# Patient Record
Sex: MALE | Race: WHITE
[De-identification: names, ages, dates, MRNs, and addresses within clinical notes are randomized per-mention and may not be internally consistent; named-entity substitution may affect disease eponyms.]

---

## 2017-05-17 NOTE — ED PHYSICIAN CHART
Chief Complaint/HPI





- Patient Information


Date Seen:: 05/17/17


Time Seen:: 17:30


Chief Complaint:: aggressive behavior


History of Present Illness:: 





pt sent from NH for agitation episodes // aggressive behavior..here for 

geripsych evaluation.





pt is demented and not good historian ..he knows he is at hospital but thinks 

he is here for a checkup.


currently he is calm and cooperative and denies pain or recent illness or 

injury.





Allergies:: 


 Allergies











Allergy/AdvReac Type Severity Reaction Status Date / Time


 


No Known Allergies Allergy   Verified 05/17/17 17:13











Vitals:: 


 Vital Signs - 8 hr











  05/17/17





  17:03


 


Temp 97.0 F


 


HR 52


 


RR 16


 


/52


 


O2 Sat % 97











Historian:: Patient





Review of Systems





- Review of Systems


General/Constitutional: No fever, No chills, No weight loss, No weakness, No 

diaphoresis, No edema, No loss of appetite, Other (dementia pt limits ros )


Skin: No skin lesions, No rash, No bruising


Head: No headache, No light-headedness


Eyes: No loss of vision, No pain, No diplopia


ENT: No earache, No nasal drainage, No sore throat, No tinnitus


Neck: No neck pain, No swelling, No thyromegaly, No stiffness, No mass noted


Cardio Vascular: No chest pain, No palpitations, No PND, No orthopnea, No edema


Pulmonary: No SOB, No cough, No sputum, No wheezing


GI: No nausea, No vomiting, No diarrhea, No pain, No melena, No hematochezia, 

No constipation, No hematemesis


G/U: No dysuria, No frequency, No hematuria


Musculoskeletal: No bone or joint pain, No back pain, No muscle pain


Endocrine: No polyuria, No polydipsia


Psychiatric: Prior psych history, No depression, No anxiety, No suicidal 

ideation, Other (aggitation)


Hematopoietic: No bruising, No lymphadenopathy


Allergic/Immuno: No urticaria, No angioedema


Neurological: No syncope, No focal symptoms, No weakness, No paresthesia, No 

headache, No seizure, No dizziness, No confusion, No vertigo





Past Medical History





- Past Medical History


Past Medical History: HTN, PUD/GERD, Thyroid disorder, Arthritis, Other (

parkinsons, poor mobility)


Social History: Care Facility


Psychiatricy History: Bipolar, Other (schizoaffective)


Medication: Reviewed





Family Medical History





- Family Member


  ** Mother


History Unknown: Yes


Ethnicity: Non-


Living Status: Still Living


Hx Family Cancer: No


Hx Family Coronary Artery Disease: No


Hx Family Congestive Heart Failure: No


Hx Family Hypertension: No


Hx Family Stroke: No


Hx Family Diabetes: No


Hx Family Seizures: No


Hx Family Dementia: No


Hx Family AIDS: No


Hx Family HIV: No


Hx Family COPD: No


Hx Family Hepatitis: No


Hx Family Psychiatric Problems: No


Hx Family Tuberculosis: No





Physical Exam





- Physical Examination


General/Constitutional: Awake, Well-developed, well-nourished, Alert, No 

distress, Non-toxic appearing, Ambulatory


Other Gen/Cons comments:: 





currently calm in nad.


somewhat confused/poor historian.


cooperative.





Head: Atraumatic


Eyes: Lids, conjuctiva normal, PERRL, EOMI


Skin: Nl inspection, No rash, No skin lesions, No ecchymosis, Well hydrated, No 

lymphadenopathy


ENMT: External ears, nose nl, Nasal exam nl, Lips, teeth, gums nl


Neck: Nontender, Full ROM w/o pain, No JVD, No nuchal rigidity, No bruit, No 

mass, No stridor


Respiratory: Nl effort/Exclusion, Clear to Auscultation, No Wheeze/Rhonchi/Rales


Cardio Vascular: RRR, No murmur, gallop, rubs, NL S1 S2


GI: No tenderness/rebounding/guarding, No organomegaly, No hernia, Normal BS's, 

Nondistended, No mass/bruits, No McBurney tenderness


: No CVA tenderness


Extremities: No tenderness or effusion, Full ROM, normal strength in all 

extremities, No edema, Normal digits & nails


Neuro/Psych: DTR's symmetric, Normal sensory exam, Normal motor strength, Mood 

normal, Normal gait, No focal deficits


Other Neuro/Psych comments:: 





alert but poorly oriented


Misc: normal gait, Normal back, No paraspinal tenderness





Labs/Radiology/EKG Results





- Lab Results


Results: 





 Laboratory Tests











  05/17/17 05/17/17 05/17/17





  17:32 17:32 17:32


 


WBC  10.5  D  


 


RBC  5.20  


 


Hgb  15.3  


 


Hct  45.5  


 


MCV  87.6  


 


MCH  29.3  


 


MCHC Differential  33.5  


 


RDW  14.1  


 


Plt Count  256  D  


 


MPV  7.4  


 


Neutrophils %  66.5  


 


Lymphocytes %  26.2  


 


Monocytes %  6.2  


 


Eosinophils %  0.6  


 


Basophils %  0.5  


 


Sodium   132 L 


 


Potassium   4.0 


 


Chloride   102 


 


Carbon Dioxide   27.4 


 


Anion Gap   6.6 L 


 


BUN   11 


 


Creatinine   0.7 


 


Est GFR ( Amer)   TNP 


 


Est GFR (Non-Af Amer)   TNP 


 


BUN/Creatinine Ratio   15.7 


 


Glucose   94 


 


Calcium   9.1 


 


Triglycerides   85 


 


Cholesterol   196 


 


LDL Cholesterol Direct   120 


 


HDL Cholesterol   67 


 


TSH    1.05


 


Valproic Acid   














  05/17/17





  17:32


 


WBC 


 


RBC 


 


Hgb 


 


Hct 


 


MCV 


 


MCH 


 


MCHC Differential 


 


RDW 


 


Plt Count 


 


MPV 


 


Neutrophils % 


 


Lymphocytes % 


 


Monocytes % 


 


Eosinophils % 


 


Basophils % 


 


Sodium 


 


Potassium 


 


Chloride 


 


Carbon Dioxide 


 


Anion Gap 


 


BUN 


 


Creatinine 


 


Est GFR ( Amer) 


 


Est GFR (Non-Af Amer) 


 


BUN/Creatinine Ratio 


 


Glucose 


 


Calcium 


 


Triglycerides 


 


Cholesterol 


 


LDL Cholesterol Direct 


 


HDL Cholesterol 


 


TSH 


 


Valproic Acid  64.1














- EKG Interpretations


EKG Time:: 18:20


Rate & Rhythm: nsr 51


Axis: 67


Intervals: wnl


Comments:: 





ok /wnl ...mild gerda





ED Septic Shock





- .


Is Septic Shock (SBP<90, OR Lactate>4 mmol\L) present?: No





- <6hrs of presentation:


Vital Signs: 


 Vital Signs - 8 hr











  05/17/17





  17:03


 


Temp 97.0 F


 


HR 52


 


RR 16


 


/52


 


O2 Sat % 97














Reassessment (Disposition)





- Reassessment


Reassessment Condition:: Unchanged





- Diagnosis


Diagnosis:: 





1 aggressive behavior


2 medically clear for geripsych admission











- Patient Disposition


Admitted to:: [facility]


Condition at Disposition:: Unchanged





ED Discharge Plan





- Patient Disposition


Instructions:  Psychosis

## 2017-05-18 NOTE — HISTORY & PHYSICAL
ADMIT DATE:  05/17/2017



HISTORY OF PRESENT ILLNESS:  The patient is a 71 years old male with long

history of hypertension, hypothyroidism, dementia, and psychosis, admitted to

[facility] under Dr. Land's service.  The

patient denies any chest pain, shortness of breath, nausea, vomiting, fever, or

chills.



PAST MEDICAL HISTORY:  Significant for hypertension, hypothyroidism, dementia,

and psychosis.



PAST SURGICAL HISTORY:  No recent surgery.



ALLERGIES:  None.



MEDICATIONS:  Follow admission reconciliation.



SOCIAL HISTORY:  No smoking, alcohol, or drugs.



FAMILY HISTORY:  Noncontributory.



REVIEW OF SYSTEMS:

IMMUNE SYSTEM:  No _____ disorder.

CARDIOVASCULAR SYSTEM:  He has history of hypertension.

ENDOCRINE SYSTEM:  He has history of hypothyroidism.

GASTROINTESTINAL SYSTEM:  No upper or lower gastrointestinal bleed.

NEUROLOGICAL SYSTEM:  No seizure disorder.

MUSCULOSKELETAL SYSTEM:  No muscular dystrophy.

HEMATOLOGIC SYSTEM:  No bleeding tendencies.

RESPIRATORY SYSTEM:  No asthma.



PHYSICAL EXAMINATION:

GENERAL:  He is awake, alert, mildly confused.

VITAL SIGNS:  Temperature 98.6, heart rate 55, blood pressure 148/82.

HEENT:  Normocephalic.  Pupils are reacting to light and accommodation.  Sclerae

clear.

NECK:  Supple.  Negative for lymphadenopathy, JVD, or bruit.

CHEST:  Bilaterally normal.  No rhonchi or wheezing.

HEART:  S1 and S2 normal.  No murmur or gallop.

ABDOMEN:  Soft.  Bowel sounds positive.

EXTREMITIES:  No edema.

NEUROLOGIC:  Awake, alert.  No focal motor or sensory deficits.



LABORATORY DATA:  White blood cells 10.5, hemoglobin 15.3, hematocrit 45.5,

platelets 256.  Sodium 132, potassium 4, BUN 11 creatinine 0.7.



ASSESSMENT:

1.  Hypertension.

2.  Hypothyroidism.

3.  Hyponatremia.

4.  Dementia.

5.  Psychosis.



PLAN:  The patient is in the hospital under Dr. Land's service.  Medical

problem to be addressed during the hospitalization is psychosis.  Medical

problems to be addressed after discharge are hypertension, hypothyroidism.  The

patient is medically stable for activity.



Thank you Dr. Land for asking me to see your patient.





DD: 05/17/2017 21:32

DT: 05/18/2017 00:46

JOB# [id number]  [id number]

## 2017-05-19 NOTE — PSYCHOSOCIAL EVALUATION
DATE OF SERVICE:  05/17/2017



IDENTIFYING DATA:  The patient is a 71-year-old  male, resident of

[facility].  Information obtained by interviewing the patient as

well as reviewing the admission papers.



JUSTIFICATION FOR HOSPITALIZATION:  The patient is admitted here on a voluntary

basis in view of his acute psychosis.



CHIEF COMPLAINT:  "I do not know."



HISTORY OF PRESENT ILLNESS:  This is one of multiple psychiatric

hospitalizations for this patient who has been diagnosed to have schizoaffective

disorder and had been hospitalized on multiple occasions, last hospitalization

was in 10/2016.  The patient has pain, also presenting with dementia symptoms

and the patient is getting easily agitated.  The patient, most of the time, is

laughing and giggling.



PAST PSYCHIATRIC HISTORY:  Please refer to the above.



MEDICAL HISTORY:  Physical examination is requested to be done by Dr. Albert.



SUBSTANCE ABUSE HISTORY:  None.



PHYSICAL OR SEXUAL ABUSE HISTORY:  None.



SOCIAL HISTORY:  The patient is a resident of the [facility].



MENTAL STATUS EXAMINATION:  The patient is a 71-year-old male, looking his

stated age, thin built, superficially cooperative.  Eye contact is poor.  Mood

is noted to be irritable.  Affect is constricted.  Insight and judgment are very

much impaired.  Impulse control is noted to be poor.  The patient is getting

easily irritable and angry.  The patient is pacing most of the time.  The

patient has short-term memory deficit, but long term memory seems to be fair.



STRENGTH AND ASSETS:  The patient is motivated.



DIAGNOSES AT THE TIME OF ADMISSION:

AXIS I:

1a.  Schizoaffective disorder.

1b. Dementia of Alzheimer's type.

AXIS II:  None.

AXIS III:  As per Dr. Albert.

AXIS IV:  Moderate.

AXIS V:  Global Assessment of Functioning 30, past year unknown.



IMMEDIATE TREATMENT PLAN:  The patient is going to be observed on inpatient

unit, provided with supportive psychotherapy.  The patient is going to be

closely monitored.  Once stabilized, the patient is going to be discharged to

[facility] to be followed up on an outpatient basis.





DD: 05/18/2017 11:52

DT: 05/19/2017 02:17

JOB# [id number]  [id number]

## 2017-05-20 NOTE — PROGRESS NOTES
DATE:  05/19/2017



TIME PATIENT SEEN:  3:30 p.m.



SUBJECTIVE:  Staff was spoken to.  The patient is interviewed.  Mood is noted to

be irritable.  Affect is constricted.  Insight and judgment are noted to be

still impaired.  Impulse control seems to be poor.  Coping skills are noted to

be poor.  The patient has been having difficult time to cope with the stress. 

The patient is currently on 20 mg of Zyprexa twice a day, and the patient is

stating that he has been feeling too sleepy in the morning, and because of his

age and it is decided to decrease the dose of the Zyprexa to only 20 mg at

bedtime and follow the patient with the supportive therapy.  The patient is

still psychotic and is not ready to be discharged.  The patient's coping skills

at this time are noted to be poor.  The patient is not safe to be discharged to

a lower level of care.





DD: 05/19/2017 19:06

DT: 05/20/2017 04:16

JOB# [id number]  [id number]

## 2017-05-21 NOTE — PROGRESS NOTES
DATE:  05/20/2017



PSYCHIATRIC PROGRESS NOTE



TIME PATIENT SEEN:  11:15 a.m.



SUBJECTIVE:  Staff was spoken to.  The patient is interviewed.  Mood is noted to

be irritable.  Affect is constricted.  The patient is laughing and giggling and

pacing most of the time, not making any sense.  The patient is currently on

Zyprexa.  Since the patient has been complaining that he is too sleepy, it has

been decreased to 20 mg at bedtime. The patient has been able to tolerate the

medication.



ASSESSMENT:  The patient is still grossly psychotic.



PLAN:  To continue the patient with the supportive therapy and followup.





DD: 05/20/2017 11:34

DT: 05/21/2017 08:23

JOB# [id number]  [id number]

## 2017-05-22 NOTE — PROGRESS NOTES
DATE:  05/22/2017



PSYCHIATRIC PROGRESS NOTE



TIME PATIENT SEEN:  9:45 a.m.



SUBJECTIVE:  Staff was spoken to.  The patient is interviewed.  Mood is noted to

be irritable.  Affect is constricted.  The patient is pacing most of the time on

the unit.  Insight and judgment at this time are noted to be very much impaired.

 No side effects to the medications are noted.  The patient has been having

difficult time to cope with the stress.  The patient is actively responding to

internal stimuli.  The patient is currently _____ Zyprexa and is able to

tolerate.



ASSESSMENT:  The patient is still psychotic.



PLAN:  To continue the patient with the current medications and follow up with

the supportive therapy.





DD: 05/22/2017 10:23

DT: 05/22/2017 22:35

[facility]# [id number]  [id number]

## 2017-05-22 NOTE — PROGRESS NOTES
DATE:  05/21/2017



PSYCHIATRIC PROGRESS NOTE



TIME PATIENT SEEN:  12 noon.



SUBJECTIVE:  Staff was spoken to.  The patient is interviewed.  Mood is noted to

be irritable.  Affect is constricted.  Insight and judgment are noted to be

still impaired.  The patient is pacing most of the time on the unit.  The

patient is still responding to the internal stimuli.  No side effects to the

medications are noted.  The patient has been placed on Zyprexa 10 mg a day and

_____ has been given at 500 mg twice a day.  The patient has been able to

tolerate.



ASSESSMENT:  The patient is still psychotic.



PLAN:  To continue the patient with the current medications and follow.





DD: 05/21/2017 14:18

DT: 05/22/2017 06:06

JOB# [id number]  [id number]

## 2017-05-24 NOTE — PROGRESS NOTES
DATE:  05/23/2017



PSYCHIATRIC PROGRESS NOTE



TIME PATIENT SEEN:  10:30 a.m.



SUBJECTIVE:  Staff was spoken to.  The patient is interviewed.  Mood is noted to

be dysphoric.  Coping skills are noted to be poor.  The patient is actively

responding to internal stimuli and pacing on the unit most of the time.  No

aggressive behavior is noted.  The patient is currently on Zyprexa 20 mg at

bedtime and is able to tolerate the medication.



ASSESSMENT:  The patient is still psychotic and impulsive.



PLAN:  To continue the patient with supportive therapy and followup.





DD: 05/23/2017 14:56

DT: 05/24/2017 04:55

JOB# [id number]  [id number]

## 2017-05-25 NOTE — PROGRESS NOTES
DATE:  05/24/2017



PSYCHIATRIC PROGRESS NOTE



TIME PATIENT SEEN:  5:45 p.m.



SUBJECTIVE:  Staff was spoken to.  The patient is interviewed.  Mood is noted to

be irritable.  Affect is constricted.  The patient is pacing most of the time on

the unit.  Insight and judgment are noted to be still impaired.  No side effects

to the medications are noted.  The patient could be redirected at this time.



ASSESSMENT:  The patient is still psychotic, able to tolerate the Zyprexa.



PLAN:  To continue the patient with the supportive therapy and followup.





DD: 05/24/2017 20:04

DT: 05/25/2017 19:41

JOB# [id number]  [id number]

## 2017-05-26 NOTE — PROGRESS NOTES
DATE:  05/25/2017



PSYCHIATRIC PROGRESS NOTE



TIME PATIENT SEEN:  10:00 a.m.



SUBJECTIVE:  Staff was spoken to.  The patient is interviewed.  Mood is noted to

be irritable.  Affect is constricted.  The patient is talking to self, not

making much sense.  The patient is currently on Depakote 500 mg twice a day and

olanzapine.  The patient has been able to tolerate the medications.  No side

effects to the medications are noted at this time.  The patient's sodium is

noted to be low 132 and they are trying to monitor.  Valproic acid level is

noted to be 64.1.  The patient is being closely monitored at this time for any

seizures.



ASSESSMENT:  The patient is still grossly psychotic.



PLAN:  To continue the patient with the supportive therapy.  I encouraged the

patient to verbalize the concerns rather than to act out.





DD: 05/25/2017 10:41

DT: 05/26/2017 04:59

JOB# [id number]  [id number]

## 2017-05-27 NOTE — PROGRESS NOTES
DATE:  05/27/2017



PSYCHIATRIC PROGRESS NOTE



TIME PATIENT SEEN:  10:15 a.m.



SUBJECTIVE:  Staff was spoken to.  The patient is interviewed.  Mood is noted to

be irritable.  Affect is constricted.  The patient is pacing most of the time on

the unit.  The patient is laughing and giggling.  Compared to yesterday, the

patient is not that aggressive today.  No side effects to the medications are

noted.



ASSESSMENT:  The patient is still psychotic.



PLAN:  To continue the patient with Zyprexa.  I encouraged the patient to _____

the concerns.  The patient is not ready to be discharge to a lower level of care

in view of his aggressive behavior.





DD: 05/27/2017 12:24

DT: 05/27/2017 22:26

JOB# [id number]  [id number]

## 2017-05-27 NOTE — PROGRESS NOTES
DATE:  05/26/2017



SUBJECTIVE:  Chart is reviewed.  The patient is interviewed.  Staff was spoken

to.  The patient is getting easily irritable and angry.  The patient has been

screaming and yelling.  The patient has been testing the limits on the unit. 

The patient could not be redirected and hence the patient has to be given a dose

of the Haldol and Benadryl.  The patient at this time is resting.



ASSESSMENT:  The patient is still grossly psychotic and impulsive.



PLAN:  To continue the patient with the current medications such as the Zyprexa

and follow him up with supportive therapy.





DD: 05/26/2017 18:49

DT: 05/27/2017 08:57

JOB# [id number]  [id number]

## 2017-05-28 NOTE — PROGRESS NOTES
DATE:  05/28/2017



PSYCHIATRIC PROGRESS NOTE



TIME PATIENT SEEN:  8:30 a.m.



SUBJECTIVE:  Staff was spoken to.  The patient is interviewed.  Mood is noted to

be irritable.  Affect is constricted.  The patient's insight and judgment are

noted to be very much impaired.  The patient is talking to self and has been

refusing to comply with the treatment.  The patient has been having difficult

time to cope with the stress.  The patient still continues to be paranoid and

refusing to take any medication at this morning.



ASSESSMENT:  The patient is grossly psychotic.



PLAN:  To start the patient with the Haldol Decanoate and continue the Zyprexa

at night time and follow the patient up.





DD: 05/28/2017 09:25

DT: 05/28/2017 19:05

[facility]# [id number]  [id number]

## 2017-05-29 NOTE — PROGRESS NOTES
DATE:  05/29/2017



PSYCHIATRIC PROGRESS NOTE



TIME PATIENT SEEN:  8:30 a.m.



SUBJECTIVE:  Staff was spoken to.  The patient is interviewed.  Mood is noted to

be irritable.  Affect is constricted.  The patient is pacing on the unit.  Since

the patient has been refusing, the patient had been given a dose of Haldol

Decanoate yesterday.  Screaming and yelling have been coming down, but the

patient continues to be responding to internal stimuli and is verbally abusive.



PLAN:  To continue the patient with the current medications and follow the

patient with supportive therapy _____.





DD: 05/29/2017 09:40

DT: 05/29/2017 23:27

JOB# [id number]  [id number]

## 2017-05-31 NOTE — PROGRESS NOTES
DATE:  05/30/2017



PSYCHIATRIC PROGRESS NOTE



TIME PATIENT SEEN:  8:15 a.m.



SUBJECTIVE:  Staff was spoken to.  The patient is interviewed.  Mood is noted to

be irritable.  Affect is constricted.  Insight and judgment are noted to be

still impaired.  Impulse control is noted to be poor.  The patient is testing

the limits.  No side effects to the medications are noted at this time.  The

patient is pacing most of the time on the unit.



ASSESSMENT:  The patient is still psychotic and impulsive.



PLAN:  To continue the patient with the supportive therapy.  I encouraged the

patient to verbalize the concerns rather than to act out.





DD: 05/30/2017 17:41

DT: 05/31/2017 07:57

JOB# [id number]  [id number]

## 2017-06-01 NOTE — PROGRESS NOTES
DATE:  05/31/2017



PSYCHIATRIC PROGRESS NOTE



TIME PATIENT SEEN:  9 a.m.



SUBJECTIVE:  Staff was spoken to.  The patient is interviewed.  Mood is noted to

be anxious.  Affect is appropriate.  Not suicidal or homicidal.  Insight and

judgment are noted to be improving.  Impulse control seemed to be fair.  No side

effects to the medications are noted.  The patient has been pacing on the unit. 

The patient has paranoia, but denies any command hallucinations.



ASSESSMENT:  The patient is stabilizing.



PLAN:  To discharge the patient today for followup on outpatient basis by Dr. Chun.  The patient is discharged back to [facility].





DD: 05/31/2017 18:13

DT: 06/01/2017 05:24

JOB# [id number]  [id number]

## 2017-11-04 ENCOUNTER — HOSPITAL ENCOUNTER (EMERGENCY)
Dept: HOSPITAL 26 - MED | Age: 72
LOS: 1 days | Discharge: NURSING, REHAB OR LONG-TERM CARE | End: 2017-11-05
Payer: COMMERCIAL

## 2017-11-04 VITALS — DIASTOLIC BLOOD PRESSURE: 76 MMHG | SYSTOLIC BLOOD PRESSURE: 144 MMHG

## 2017-11-04 VITALS — HEIGHT: 73 IN | WEIGHT: 160 LBS | BODY MASS INDEX: 21.2 KG/M2

## 2017-11-04 DIAGNOSIS — I10: ICD-10-CM

## 2017-11-04 DIAGNOSIS — Z79.899: ICD-10-CM

## 2017-11-04 DIAGNOSIS — F20.89: ICD-10-CM

## 2017-11-04 DIAGNOSIS — I86.1: Primary | ICD-10-CM

## 2017-11-04 PROCEDURE — 93005 ELECTROCARDIOGRAM TRACING: CPT

## 2017-11-04 PROCEDURE — 85025 COMPLETE CBC W/AUTO DIFF WBC: CPT

## 2017-11-04 PROCEDURE — 96360 HYDRATION IV INFUSION INIT: CPT

## 2017-11-04 PROCEDURE — 84484 ASSAY OF TROPONIN QUANT: CPT

## 2017-11-04 PROCEDURE — 71010: CPT

## 2017-11-04 PROCEDURE — 83605 ASSAY OF LACTIC ACID: CPT

## 2017-11-04 PROCEDURE — 85610 PROTHROMBIN TIME: CPT

## 2017-11-04 PROCEDURE — 96361 HYDRATE IV INFUSION ADD-ON: CPT

## 2017-11-04 PROCEDURE — 85730 THROMBOPLASTIN TIME PARTIAL: CPT

## 2017-11-04 PROCEDURE — 87040 BLOOD CULTURE FOR BACTERIA: CPT

## 2017-11-04 PROCEDURE — 87086 URINE CULTURE/COLONY COUNT: CPT

## 2017-11-04 PROCEDURE — 80053 COMPREHEN METABOLIC PANEL: CPT

## 2017-11-04 PROCEDURE — 76870 US EXAM SCROTUM: CPT

## 2017-11-04 PROCEDURE — 36415 COLL VENOUS BLD VENIPUNCTURE: CPT

## 2017-11-04 PROCEDURE — 99285 EMERGENCY DEPT VISIT HI MDM: CPT

## 2017-11-04 PROCEDURE — 81001 URINALYSIS AUTO W/SCOPE: CPT

## 2017-11-04 NOTE — NUR
72Y/M PT. BIBA TO ED WITH C/O ENLARGE SCROTUM. PER EMS;PT. IS A RESIDENT AT 
[facility], NOTICED HAVE ENLARGE SCROTUM AT 1930, NO INJURY NOR TRAUMA. PT. HX. HTN, 
PARKINSON'S DISEASE, BIPOLAR, SCHIZOPRENIA. AAO X1 TO SELF. AMBULATORY WITH 
ASSIST. RESPIRATIONS ROOM AIR, EVEN AND UNLABORED. SKIN WARM AND DRY, LT. 
SCROTUM ENLARGE, NO SKIN BREAKDOWN NOTED. NO C/O PAIN NOR DISCOMFORT. VSS, ER 
MD MADE AWARE OF PT. STATUS.

## 2017-11-05 VITALS — DIASTOLIC BLOOD PRESSURE: 90 MMHG | SYSTOLIC BLOOD PRESSURE: 154 MMHG

## 2017-11-05 LAB
ALBUMIN FLD-MCNC: 3.2 G/DL (ref 3.4–5)
ANION GAP SERPL CALCULATED.3IONS-SCNC: 8.4 MMOL/L (ref 8–16)
APPEARANCE UR: CLEAR
AST SERPL-CCNC: 15 U/L (ref 15–37)
BASOPHILS NFR BLD MANUAL: 1 % (ref 0–2)
BILIRUB SERPL-MCNC: 0.4 MG/DL (ref 0–1)
BILIRUB UR QL STRIP: NEGATIVE
BUN SERPL-MCNC: 14 MG/DL (ref 7–18)
CHLORIDE SERPL-SCNC: 104 MMOL/L (ref 98–107)
CO2 SERPL-SCNC: 31.5 MMOL/L (ref 21–32)
COLOR UR: YELLOW
CREAT SERPL-MCNC: 0.7 MG/DL (ref 0.7–1.3)
EOSINOPHIL NFR BLD MANUAL: 1 % (ref 0–4)
ERYTHROCYTE [DISTWIDTH] IN BLOOD BY AUTOMATED COUNT: 13.8 % (ref 11.6–13.7)
GFR SERPL CREATININE-BSD FRML MDRD: (no result) ML/MIN (ref 90–?)
GLUCOSE SERPL-MCNC: 96 MG/DL (ref 74–106)
GLUCOSE UR STRIP-MCNC: NEGATIVE MG/DL
HCT VFR BLD AUTO: 41.6 % (ref 36–52)
HGB BLD-MCNC: 14.1 G/DL (ref 12–18)
HGB UR QL STRIP: NEGATIVE
LEUKOCYTE ESTERASE UR QL STRIP: NEGATIVE
LYMPHOCYTES NFR BLD MANUAL: 23 % (ref 20–46)
MCH RBC QN AUTO: 30 PG (ref 27–31)
MCHC RBC AUTO-ENTMCNC: 34 G/DL (ref 33–37)
MCV RBC AUTO: 89 FL (ref 80–94)
MONOCYTES NFR BLD MANUAL: 5 % (ref 5–12)
NITRITE UR QL STRIP: NEGATIVE
PH UR STRIP: 7 [PH] (ref 5–9)
PLATELET # BLD AUTO: 215 K/UL (ref 140–450)
POTASSIUM SERPL-SCNC: 3.9 MMOL/L (ref 3.5–5.1)
PROTHROMBIN TIME: 10.8 SECS (ref 10.8–13.4)
RBC # BLD AUTO: 4.67 MIL/UL (ref 4.2–6.1)
RBC #/AREA URNS HPF: (no result) /HPF (ref 0–5)
SODIUM SERPL-SCNC: 140 MMOL/L (ref 136–145)
WBC # BLD AUTO: 7 K/UL (ref 4.8–10.8)
WBC,URINE: (no result) /HPF (ref 0–5)

## 2017-11-05 NOTE — NUR
Patient discharged with v/s stable. Written and verbal after care instructions 
given and explained. 

Patient alert, oriented and verbalized understanding of instructions. Ambulance 
Transport with to nursing home. All questions addressed prior to discharge. ID 
band removed. Patient advised to follow up with PMD. Rx of  given. Patient 
educated on indication of medication including possible reaction and side 
effects. Opportunity to ask questions provided and answered. MORIAH SÁNCHEZ


-------------------------------------------------------------------------------

Addendum: 11/05/17 at 0204 by [facility]

-------------------------------------------------------------------------------

FOR CLARIFICATION; PREMIER SÁNCHEZ PT. TO [facility].

## 2018-03-16 ENCOUNTER — HOSPITAL ENCOUNTER (INPATIENT)
Dept: HOSPITAL 36 - ER | Age: 73
LOS: 14 days | Discharge: NURSING, REHAB OR LONG-TERM CARE | DRG: 885 | End: 2018-03-30
Attending: PSYCHIATRY & NEUROLOGY | Admitting: PSYCHIATRY & NEUROLOGY
Payer: MEDICARE

## 2018-03-16 VITALS — SYSTOLIC BLOOD PRESSURE: 130 MMHG | DIASTOLIC BLOOD PRESSURE: 72 MMHG

## 2018-03-16 DIAGNOSIS — E86.0: ICD-10-CM

## 2018-03-16 DIAGNOSIS — F25.9: Primary | ICD-10-CM

## 2018-03-16 DIAGNOSIS — E87.1: ICD-10-CM

## 2018-03-16 DIAGNOSIS — G20: ICD-10-CM

## 2018-03-16 DIAGNOSIS — F02.81: ICD-10-CM

## 2018-03-16 DIAGNOSIS — I10: ICD-10-CM

## 2018-03-16 DIAGNOSIS — E03.9: ICD-10-CM

## 2018-03-16 DIAGNOSIS — F29: ICD-10-CM

## 2018-03-16 DIAGNOSIS — K21.9: ICD-10-CM

## 2018-03-16 DIAGNOSIS — M19.90: ICD-10-CM

## 2018-03-16 LAB
ALBUMIN SERPL-MCNC: 3.7 GM/DL (ref 4.2–5.5)
ALBUMIN/GLOB SERPL: 1.3 {RATIO} (ref 1–1.8)
ALP SERPL-CCNC: 42 U/L (ref 34–104)
ALT SERPL-CCNC: 13 U/L (ref 7–52)
ANION GAP SERPL CALC-SCNC: 5 MMOL/L (ref 7–16)
AST SERPL-CCNC: 18 U/L (ref 13–39)
BASOPHILS # BLD AUTO: 0 TH/CUMM (ref 0–0.2)
BASOPHILS NFR BLD AUTO: 0.6 % (ref 0–2)
BILIRUB SERPL-MCNC: 0.5 MG/DL (ref 0.3–1)
BUN SERPL-MCNC: 21 MG/DL (ref 7–25)
CALCIUM SERPL-MCNC: 9 MG/DL (ref 8.6–10.3)
CHLORIDE SERPL-SCNC: 102 MEQ/L (ref 98–107)
CO2 SERPL-SCNC: 28 MEQ/L (ref 21–31)
CREAT SERPL-MCNC: 0.7 MG/DL (ref 0.7–1.3)
EOSINOPHIL # BLD AUTO: 0.1 TH/CMM (ref 0.1–0.4)
EOSINOPHIL NFR BLD AUTO: 0.9 % (ref 0–5)
ERYTHROCYTE [DISTWIDTH] IN BLOOD BY AUTOMATED COUNT: 13.3 % (ref 11.5–20)
GLOBULIN SER-MCNC: 2.8 GM/DL
GLUCOSE SERPL-MCNC: 94 MG/DL (ref 70–105)
HCT VFR BLD CALC: 42.2 % (ref 41–60)
HGB BLD-MCNC: 14.2 GM/DL (ref 12–16)
LYMPHOCYTE AB SER FC-ACNC: 2.2 TH/CMM (ref 1.5–3)
LYMPHOCYTES NFR BLD AUTO: 31.7 % (ref 20–50)
MCH RBC QN AUTO: 29.5 PG (ref 27–31)
MCHC RBC AUTO-ENTMCNC: 33.7 PG (ref 28–36)
MCV RBC AUTO: 87.6 FL (ref 80–99)
MONOCYTES # BLD AUTO: 0.6 TH/CMM (ref 0.3–1)
MONOCYTES NFR BLD AUTO: 9.3 % (ref 2–10)
NEUTROPHILS # BLD: 4 TH/CMM (ref 1.8–8)
NEUTROPHILS NFR BLD AUTO: 57.5 % (ref 40–80)
PLATELET # BLD: 215 TH/CMM (ref 150–400)
PMV BLD AUTO: 7.1 FL
POTASSIUM SERPL-SCNC: 4 MEQ/L (ref 3.5–5.1)
RBC # BLD AUTO: 4.82 MIL/CMM (ref 3.8–5.8)
SODIUM SERPL-SCNC: 131 MEQ/L (ref 136–145)
WBC # BLD AUTO: 6.9 TH/CMM (ref 4.8–10.8)

## 2018-03-16 PROCEDURE — Z7610: HCPCS

## 2018-03-16 PROCEDURE — G0410 GRP PSYCH PARTIAL HOSP 45-50: HCPCS

## 2018-03-16 NOTE — ED PHYSICIAN CHART
ED Chief Complaint/HPI





- Patient Information


Date Seen:: 03/16/18


Time Seen:: 18:15


Chief Complaint:: Increased agitation


History of Present Illness:: 


73 yo male was brought from SNF to ER due to increased agitation and confusion, 

with yelling and slamming doors.


Allergies:: 


 Allergies











Allergy/AdvReac Type Severity Reaction Status Date / Time


 


No Known Allergies Allergy   Verified 03/16/18 18:11














ED Review of Systems





- Review of Systems


General/Constitutional: No fever


Skin: No skin lesions


Head: No headache


Eyes: No pain


ENT: No earache


Neck: No neck pain


Cardio Vascular: No chest pain


Pulmonary: No SOB


GI: No nausea, No vomiting


Musculoskeletal: No bone or joint pain


Psychiatric: Prior psych history





ED Past Medical History





- Past Medical History


Past Medical History: HTN, PUD/GERD, Thyroid disorder, Arthritis


Social History: Non Smoker, No Alcohol, No Drug Use


Surgical History: None


Psychiatricy History: Schizophrenia, Bipolar, Dementia, Other (Anxiety, 

Parkinson's disease)





Family Medical History





- Family Member


  ** Mother


History Unknown: Yes


Ethnicity: Unknown


Living Status: Unknown


Hx Family Cancer: No


Hx Family Coronary Artery Disease: No


Hx Family Congestive Heart Failure: No


Hx Family Hypertension: No


Hx Family Stroke: No


Hx Family Diabetes: No


Hx Family Seizures: No


Hx Family Dementia: No


Hx Family AIDS: No


Hx Family HIV: No


Hx Family COPD: No


Hx Family Hepatitis: No


Hx Family Psychiatric Problems: No


Hx Family Tuberculosis: No





ED Physical Exam





- Physical Examination


General/Constitutional: Awake, Alert


Head: Atraumatic


Eyes: PERRL


Skin: No skin lesions


ENMT: Nasal exam nl


Neck: No nuchal rigidity


Respiratory: Clear to Auscultation, No Wheeze/Rhonchi/Rales


Cardio Vascular: RRR, No murmur, gallop, rubs, NL S1 S2


GI: No tenderness/rebounding/guarding


Extremities: normal strength in all extremities


Neuro/Psych: No focal deficits


Other Neuro/Psych comments:: 


Oriented to self only, unable to follow command





ED Labs/Radiology/EKG Results





- Radiology Results


Results: 


CXR: no consolidation





- EKG Interpretations


EKG Time:: 18:27


Rate & Rhythm: Sinus rhythm





ED Assessment





- Assessment


General Assessment: 


Psychosis


Dehydration


Hyponatremia


Assessment/Comments:: 


CBC, CMP, UA


CXR, EKG


NS 1L IV bolus


Cleared for psych admission





ED Septic Shock





- .


Is Septic Shock (SBP<90, OR Lactate>4 mmol\L) present?: No





ED Reassessment (Disposition)





- Reassessment


Reassessment Condition:: Improved





- Patient Disposition


Discharge/Transfer:: Mohinder patterson/in this hosp


Admitting Medical Physician:: Antony Albert


Admitting Psych Physician:: Arvind Land

## 2018-03-17 RX ADMIN — Medication SCH TAB: at 08:55

## 2018-03-17 RX ADMIN — LEVOTHYROXINE SODIUM SCH MG: 100 TABLET ORAL at 06:39

## 2018-03-17 NOTE — DIAGNOSTIC IMAGING REPORT
Portable chest x-ray



History: Shortness of breath



Allowing for portable technique the heart size is normal. No focal

pulmonary parenchymal processes. No hilar or mediastinal abnormalities. 

Degenerative changes seen to the spine.



Impression: No acute abnormalities.

## 2018-03-17 NOTE — HISTORY & PHYSICAL
ADMIT DATE:  



HISTORY OF PRESENT ILLNESS:  The patient is a 72 years old male with long

history of hypertension, hypothyroidism, dementia, psychosis, admitted to

[facility] Department under Dr. Land's service for evaluation and

treatment.  The patient is a very poor historian.



PAST MEDICAL HISTORY:  Significant for hypertension, hypothyroidism, dementia,

psychosis and degenerative joint disease.



PAST SURGICAL HISTORY:  No recent surgery.



ALLERGIES:  None.



MEDICATIONS:  Follow admission reconciliation..



SOCIAL HISTORY:  No smoking, no alcohol, no drug.



FAMILY HISTORY:  Noncontributory.



REVIEW OF SYSTEMS:

IMMUNO SYSTEM:  No history of chronic immune disorder.

CARDIOVASCULAR SYSTEM:  No coronary artery disease.

ENDOCRINE SYSTEM:  No diabetes mellitus.  He has history of hypothyroidism.

GASTROINTESTINAL SYSTEM:  No upper or lower gastrointestinal bleed.

NEUROLOGICAL SYSTEM:  He has history of psychosis, dementia.

SKELETOMUSCULAR SYSTEM:  He has degenerative joint disease.

RESPIRATORY SYSTEM:  No asthma.

GENITOURINARY:  No dysuria or hematuria..



PHYSICAL EXAMINATION:

GENERAL:  He is awake, not coherent.

VITAL SIGNS:  Temperature 98, heart rate 68, blood pressure 136/72.

HEENT:  Normocephalic.  Pupils are reacting to light and accommodation.  Sclerae

clear.

NECK:  Supple.  Negative for lymphadenopathy, JVD or bruit.

CHEST:  Entry of air bilaterally normal.  No rhonchi or wheezing.

HEART:  S1, S2 normal, no gallop rhythm.

ABDOMEN:  Soft, bowel sounds positive.

EXTREMITIES:  No edema.

NEUROLOGIC:  He is awake, not coherent.  He has unstable gait.



LABORATORY DATA:  White blood 6.9, hemoglobin 14.2, hematocrit 42.2, platelets

250.  Sodium 131, potassium 4, BUN 21, creatinine 0.7.



ASSESSMENT:

1.  Hypertension.

2.  Hypothyroidism.

3.  Hyponatremia.

4.  Degenerative joint disease.

5.  Psychosis.

6.  Dementia.



PLAN:  The patient is in the hospital under Dr. Land's service for more

evaluation and treatment.  Medical problems addressed during hospitalization are

psychosis and dementia.  Medical problems addressed at discharge are

hypertension and hypothyroidism.  The patient is medically stable for activity.



Thank you, Dr. Land for asking me to see your patient.





DD: 03/16/2018 23:27

DT: 03/17/2018 01:33

JOB# [id number]  [id number]

## 2018-03-17 NOTE — GENERAL PROGRESS NOTE
Subjective





- Review of Systems


Service Date: 03/17/18


Subjective: 





resting comfortably


no distress





Objective





- Results


Result Diagrams: 


 03/16/18 18:28





 03/16/18 18:28


Recent Labs: 


 Laboratory Last Values











WBC  6.9 Th/cmm (4.8-10.8)   03/16/18  18:28    


 


RBC  4.82 Mil/cmm (3.80-5.80)   03/16/18  18:28    


 


Hgb  14.2 gm/dL (12-16)   03/16/18  18:28    


 


Hct  42.2 % (41.0-60)   03/16/18  18:28    


 


MCV  87.6 fl (80-99)   03/16/18  18:28    


 


MCH  29.5 pg (27.0-31.0)   03/16/18 18:28    


 


MCHC Differential  33.7 pg (28.0-36.0)   03/16/18 18:28    


 


RDW  13.3 % (11.5-20.0)   03/16/18 18:28    


 


Plt Count  215 Th/cmm (150-400)   03/16/18  18:28    


 


MPV  7.1 fl  03/16/18  18:28    


 


Neutrophils %  57.5 % (40.0-80.0)   03/16/18  18:28    


 


Lymphocytes %  31.7 % (20.0-50.0)   03/16/18 18:28    


 


Monocytes %  9.3 % (2.0-10.0)   03/16/18  18:28    


 


Eosinophils %  0.9 % (0.0-5.0)   03/16/18 18:28    


 


Basophils %  0.6 % (0.0-2.0)   03/16/18  18:28    


 


Sodium  131 mEq/L (136-145)  L  03/16/18  18:28    


 


Potassium  4.0 mEq/L (3.5-5.1)   03/16/18  18:28    


 


Chloride  102 mEq/L ()   03/16/18  18:28    


 


Carbon Dioxide  28.0 mEq/L (21.0-31.0)   03/16/18  18:28    


 


Anion Gap  5.0  (7.0-16.0)  L  03/16/18  18:28    


 


BUN  21 mg/dL (7-25)   03/16/18  18:28    


 


Creatinine  0.7 mg/dL (0.7-1.3)   03/16/18  18:28    


 


Est GFR ( Amer)  TNP   03/16/18  18:28    


 


Est GFR (Non-Af Amer)  TNP   03/16/18  18:28    


 


BUN/Creatinine Ratio  30.0   03/16/18  18:28    


 


Glucose  94 mg/dL ()   03/16/18  18:28    


 


Calcium  9.0 mg/dL (8.6-10.3)   03/16/18  18:28    


 


Total Bilirubin  0.5 mg/dL (0.3-1.0)   03/16/18  18:28    


 


AST  18 U/L (13-39)   03/16/18  18:28    


 


ALT  13 U/L (7-52)   03/16/18  18:28    


 


Alkaline Phosphatase  42 U/L ()   03/16/18  18:28    


 


Total Protein  6.5 gm/dL (6.0-8.3)   03/16/18  18:28    


 


Albumin  3.7 gm/dL (4.2-5.5)  L  03/16/18  18:28    


 


Globulin  2.8 gm/dL  03/16/18  18:28    


 


Albumin/Globulin Ratio  1.3  (1.0-1.8)   03/16/18  18:28    


 


TSH  0.56 uIU/ml (0.34-5.60)   03/16/18  18:28    














- Physical Exam


Vitals and I&O: 


 Vital Signs











Temp  98.0 F   03/16/18 22:11


 


Pulse  81   03/17/18 08:56


 


Resp  20   03/16/18 22:11


 


BP  161/73   03/17/18 08:56


 


Pulse Ox  98   03/16/18 22:11











Active Medications: 


Current Medications





Acetaminophen (Tylenol)  650 mg PO Q4HR PRN


   PRN Reason: Mild Pain / Temp above 100


   Stop: 05/15/18 22:05


Al Hydrox/Mg Hydrox/Simethicone (Maalox)  30 ml PO Q4HR PRN


   PRN Reason: GI DISTRESS


   Stop: 05/15/18 22:05


Divalproex Sodium (Depakote Er)  500 mg PO Q12HR DANYA


   PRN Reason: Protocol


   Stop: 05/16/18 08:59


Docusate Sodium (Colace)  100 mg PO BID DANYA


   Stop: 05/16/18 08:59


   Last Admin: 03/17/18 08:56 Dose:  100 mg


Levothyroxine Sodium (Synthroid)  0.1 mg PO QDAC DANYA


   Stop: 05/16/18 07:29


   Last Admin: 03/17/18 06:39 Dose:  0.1 mg


Lorazepam (Ativan)  0.5 mg PO Q4HR PRN; Protocol


   PRN Reason: Anxiety


   Stop: 04/15/18 22:05


Magnesium Hydroxide (Milk Of Magnesia)  30 ml PO HS PRN


   PRN Reason: Constipation


Magnesium Hydroxide (Milk Of Magnesia)  30 ml PO DAILY PRN


   PRN Reason: Constipation


   Stop: 05/15/18 23:17


Metoprolol Succinate (Toprol Xl)  50 mg PO DAILY DANYA


   Stop: 05/16/18 08:59


   Last Admin: 03/17/18 08:56 Dose:  50 mg


Olanzapine (Zyprexa)  20 mg PO HS DANYA


   PRN Reason: Protocol


   Stop: 05/16/18 20:59


Zolpidem Tartrate (Ambien)  5 mg PO HS PRN


   PRN Reason: Insomnia


   Stop: 05/15/18 22:05








General: Alert, No acute distress


HEENT: PERRLA, EOMI


Neck: Supple, JVD, Thyromegaly


Cardiovascular: Regular rate, Normal S1, Normal S2


Lungs: Clear to auscultation


Abdomen: Bowel sounds, Soft





- Procedures


Procedures: 


 Procedures











Procedure Code Date


 


GROUP PSYCHOTHERAPY 63054 05/02/16


 


GROUP PSYCHOTHERAPY GZHZZZZ 05/02/16


 


GROUP PSYCHOTHERAPY [id number] 01/18/16


 


GROUP PSYCHOTHERAPY GZHZZZZ 01/18/16


 


INDIVID PSYCHOTHERAP NEC 94.39 05/20/08


 


OTHER GROUP THERAPY 94.44 08/27/15


 


RECREATIONAL THERAPY 93.81 01/17/13














Assessment/Plan





- Assessment


Assessment: 





HTN


Hypothyroid


dementia


psychosis


DJD





- Plan


Plan: 





cont current treatment

## 2018-03-18 RX ADMIN — Medication SCH TAB: at 08:18

## 2018-03-18 RX ADMIN — LEVOTHYROXINE SODIUM SCH MG: 100 TABLET ORAL at 06:53

## 2018-03-18 NOTE — PSYCHOSOCIAL EVALUATION
DATE OF SERVICE:  03/16/2018



IDENTIFYING DATA:  The patient is a 72-year-old resident of [facility].  Information obtained by directly interviewing the patient as well

as reviewing the admission papers and they are reliable.



JUSTIFICATION FOR HOSPITALIZATION:  The patient is admitted here on a voluntary

basis in view of his acute psychosis.



CHIEF COMPLAINT:  "They are bothering me a lot."



HISTORY OF PRESENT ILLNESS:  This is one of multiple psychiatric

hospitalizations for this patient who had been under my care last year.  The

patient has been diagnosed to have schizoaffective disorder and being followed

by Dr. Driscoll on an outpatient basis.  The patient has been on Zyprexa.  The

patient is reported to have been very agitated, screaming and yelling and

striking out at the patients and other residents and the staff members and hence

the patient has to be transferred over here because he was not able to be

managed at a lower level of care.



PAST PSYCHIATRIC HISTORY:  Please refer to the above.  The patient was

hospitalized on multiple occasions.



MEDICAL AND PHYSICAL EXAMINATION:  Requested to be done by Dr. Albert.



PHYSICAL OR SEXUAL ABUSE HISTORY:  None.



LEGAL PROBLEMS:  None at this time.



SOCIAL HISTORY:  The patient is a resident of the [facility].



MENTAL STATUS EXAMINATION:  The patient is a 72-year-old, looking his stated

age, superficially cooperative.  Eye contact is poor.  Mood is noted to be

irritable.  Affect is constricted.  The patient is talking to self.  The patient

is very paranoid at this time.  The patient is not able to contract for safety. 

Insight and judgment at this time are noted to be much impaired.  Impulse

control seems to be limited.  The patient is alert and aware that he is in the

hospital.  The patient's coping skills are noted to be poor at this time.  The

patient has been very nauseated and he is getting easily upset.  The patient is

alert and oriented x 3.



DIAGNOSTIC IMPRESSION:

AXIS I:  Schizoaffective disorder.

AXIS II:  Dementia and behavioral change, secondary trait.

AXIS III:  As per Dr. Albert.



IMMEDIATE TREATMENT PLAN:  The patient is going to be observed on inpatient

unit, provided with supportive psychotherapy.  The patient is going to be

closely monitored.  He is going to be continued on the olanzapine 20 mg at

bedtime and the patient is going to be encouraged to participate in the groups

and verbalize the concerns.  Once stabilized, the patient is going to be

discharged to [facility] to be followed up on an outpatient basis.





DD: 03/17/2018 14:09

DT: 03/18/2018 02:49

JOB# [id number]  [id number]

## 2018-03-18 NOTE — GENERAL PROGRESS NOTE
Subjective





- Review of Systems


Service Date: 03/18/18


Subjective: 





resting comfortably


no distress





Objective





- Results


Result Diagrams: 


 03/16/18 18:28





 03/16/18 18:28


Recent Labs: 


 Laboratory Last Values











WBC  6.9 Th/cmm (4.8-10.8)   03/16/18  18:28    


 


RBC  4.82 Mil/cmm (3.80-5.80)   03/16/18  18:28    


 


Hgb  14.2 gm/dL (12-16)   03/16/18  18:28    


 


Hct  42.2 % (41.0-60)   03/16/18  18:28    


 


MCV  87.6 fl (80-99)   03/16/18  18:28    


 


MCH  29.5 pg (27.0-31.0)   03/16/18 18:28    


 


MCHC Differential  33.7 pg (28.0-36.0)   03/16/18 18:28    


 


RDW  13.3 % (11.5-20.0)   03/16/18 18:28    


 


Plt Count  215 Th/cmm (150-400)   03/16/18  18:28    


 


MPV  7.1 fl  03/16/18  18:28    


 


Neutrophils %  57.5 % (40.0-80.0)   03/16/18  18:28    


 


Lymphocytes %  31.7 % (20.0-50.0)   03/16/18 18:28    


 


Monocytes %  9.3 % (2.0-10.0)   03/16/18  18:28    


 


Eosinophils %  0.9 % (0.0-5.0)   03/16/18 18:28    


 


Basophils %  0.6 % (0.0-2.0)   03/16/18  18:28    


 


Sodium  131 mEq/L (136-145)  L  03/16/18  18:28    


 


Potassium  4.0 mEq/L (3.5-5.1)   03/16/18  18:28    


 


Chloride  102 mEq/L ()   03/16/18  18:28    


 


Carbon Dioxide  28.0 mEq/L (21.0-31.0)   03/16/18  18:28    


 


Anion Gap  5.0  (7.0-16.0)  L  03/16/18  18:28    


 


BUN  21 mg/dL (7-25)   03/16/18  18:28    


 


Creatinine  0.7 mg/dL (0.7-1.3)   03/16/18  18:28    


 


Est GFR ( Amer)  TNP   03/16/18  18:28    


 


Est GFR (Non-Af Amer)  TNP   03/16/18  18:28    


 


BUN/Creatinine Ratio  30.0   03/16/18  18:28    


 


Glucose  94 mg/dL ()   03/16/18  18:28    


 


Calcium  9.0 mg/dL (8.6-10.3)   03/16/18  18:28    


 


Total Bilirubin  0.5 mg/dL (0.3-1.0)   03/16/18  18:28    


 


AST  18 U/L (13-39)   03/16/18  18:28    


 


ALT  13 U/L (7-52)   03/16/18  18:28    


 


Alkaline Phosphatase  42 U/L ()   03/16/18  18:28    


 


Total Protein  6.5 gm/dL (6.0-8.3)   03/16/18  18:28    


 


Albumin  3.7 gm/dL (4.2-5.5)  L  03/16/18  18:28    


 


Globulin  2.8 gm/dL  03/16/18  18:28    


 


Albumin/Globulin Ratio  1.3  (1.0-1.8)   03/16/18  18:28    


 


TSH  0.56 uIU/ml (0.34-5.60)   03/16/18  18:28    














- Physical Exam


Vitals and I&O: 


 Vital Signs











Temp  97.0 F   03/17/18 15:34


 


Pulse  72   03/18/18 08:17


 


Resp  18   03/17/18 20:00


 


BP  151/69   03/18/18 08:17


 


Pulse Ox  98   03/17/18 15:34








 Intake & Output











 03/17/18 03/18/18 03/18/18





 18:59 06:59 18:59


 


Intake Total 1200  


 


Balance 1200  


 


Intake:   


 


  Oral 1200  


 


Other:   


 


  # Voids 3  


 


  # Bowel Movements 1  











Active Medications: 


Current Medications





Acetaminophen (Tylenol)  650 mg PO Q4HR PRN


   PRN Reason: Mild Pain / Temp above 100


   Stop: 05/15/18 22:05


Al Hydrox/Mg Hydrox/Simethicone (Maalox)  30 ml PO Q4HR PRN


   PRN Reason: GI DISTRESS


   Stop: 05/15/18 22:05


Divalproex Sodium (Depakote Er)  500 mg PO Q12HR DANYA


   PRN Reason: Protocol


   Stop: 05/16/18 08:59


   Last Admin: 03/18/18 08:18 Dose:  500 mg


Docusate Sodium (Colace)  100 mg PO BID DANYA


   Stop: 05/16/18 08:59


   Last Admin: 03/18/18 08:18 Dose:  100 mg


Levothyroxine Sodium (Synthroid)  0.1 mg PO QDAC DANYA


   Stop: 05/16/18 07:29


   Last Admin: 03/18/18 06:53 Dose:  0.1 mg


Lorazepam (Ativan)  0.5 mg PO Q4HR PRN; Protocol


   PRN Reason: Anxiety


   Stop: 04/15/18 22:05


Magnesium Hydroxide (Milk Of Magnesia)  30 ml PO HS PRN


   PRN Reason: Constipation


Magnesium Hydroxide (Milk Of Magnesia)  30 ml PO DAILY PRN


   PRN Reason: Constipation


   Stop: 05/15/18 23:17


Metoprolol Succinate (Toprol Xl)  50 mg PO DAILY DANYA


   Stop: 05/16/18 08:59


   Last Admin: 03/18/18 08:17 Dose:  50 mg


Olanzapine (Zyprexa)  20 mg PO HS DANYA


   PRN Reason: Protocol


   Stop: 05/16/18 20:59


   Last Admin: 03/17/18 21:12 Dose:  20 mg


Zolpidem Tartrate (Ambien)  5 mg PO HS PRN


   PRN Reason: Insomnia


   Stop: 05/15/18 22:05


   Last Admin: 03/17/18 21:12 Dose:  5 mg








General: Alert, No acute distress


HEENT: PERRLA, EOMI


Neck: Supple, JVD, Thyromegaly


Cardiovascular: Regular rate, Normal S1, Normal S2


Lungs: Clear to auscultation


Abdomen: Bowel sounds, Soft





- Procedures


Procedures: 


 Procedures











Procedure Code Date


 


GROUP PSYCHOTHERAPY 79505 05/02/16


 


GROUP PSYCHOTHERAPY GZHZZZZ 05/02/16


 


GROUP PSYCHOTHERAPY [id number] 01/18/16


 


GROUP PSYCHOTHERAPY GZHZZZZ 01/18/16


 


INDIVID PSYCHOTHERAP NEC 94.39 05/20/08


 


OTHER GROUP THERAPY 94.44 08/27/15


 


RECREATIONAL THERAPY 93.81 01/17/13














Assessment/Plan





- Assessment


Assessment: 





HTN


Hypothyroid


dementia


psychosis


DJD





- Plan


Plan: 





cont current treatment

## 2018-03-19 RX ADMIN — LEVOTHYROXINE SODIUM SCH MG: 100 TABLET ORAL at 06:33

## 2018-03-19 RX ADMIN — Medication SCH TAB: at 08:24

## 2018-03-19 NOTE — INTERNAL MEDICINE PROG NOTE
Internal Medicine Subjective





- Subjective


Service Date: 03/19/18


Patient seen and examined:: with staff





Internal Medicine Objective





- Results


Result Diagrams: 


 03/16/18 18:28





 03/16/18 18:28


Recent Labs: 


 Laboratory Last Values











WBC  6.9 Th/cmm (4.8-10.8)   03/16/18  18:28    


 


RBC  4.82 Mil/cmm (3.80-5.80)   03/16/18  18:28    


 


Hgb  14.2 gm/dL (12-16)   03/16/18  18:28    


 


Hct  42.2 % (41.0-60)   03/16/18  18:28    


 


MCV  87.6 fl (80-99)   03/16/18  18:28    


 


MCH  29.5 pg (27.0-31.0)   03/16/18 18:28    


 


MCHC Differential  33.7 pg (28.0-36.0)   03/16/18  18:28    


 


RDW  13.3 % (11.5-20.0)   03/16/18  18:28    


 


Plt Count  215 Th/cmm (150-400)   03/16/18  18:28    


 


MPV  7.1 fl  03/16/18  18:28    


 


Neutrophils %  57.5 % (40.0-80.0)   03/16/18  18:28    


 


Lymphocytes %  31.7 % (20.0-50.0)   03/16/18  18:28    


 


Monocytes %  9.3 % (2.0-10.0)   03/16/18  18:28    


 


Eosinophils %  0.9 % (0.0-5.0)   03/16/18  18:28    


 


Basophils %  0.6 % (0.0-2.0)   03/16/18  18:28    


 


Sodium  131 mEq/L (136-145)  L  03/16/18  18:28    


 


Potassium  4.0 mEq/L (3.5-5.1)   03/16/18  18:28    


 


Chloride  102 mEq/L ()   03/16/18  18:28    


 


Carbon Dioxide  28.0 mEq/L (21.0-31.0)   03/16/18  18:28    


 


Anion Gap  5.0  (7.0-16.0)  L  03/16/18  18:28    


 


BUN  21 mg/dL (7-25)   03/16/18  18:28    


 


Creatinine  0.7 mg/dL (0.7-1.3)   03/16/18  18:28    


 


Est GFR ( Amer)  TNP   03/16/18  18:28    


 


Est GFR (Non-Af Amer)  TNP   03/16/18  18:28    


 


BUN/Creatinine Ratio  30.0   03/16/18  18:28    


 


Glucose  94 mg/dL ()   03/16/18  18:28    


 


Calcium  9.0 mg/dL (8.6-10.3)   03/16/18  18:28    


 


Total Bilirubin  0.5 mg/dL (0.3-1.0)   03/16/18  18:28    


 


AST  18 U/L (13-39)   03/16/18  18:28    


 


ALT  13 U/L (7-52)   03/16/18  18:28    


 


Alkaline Phosphatase  42 U/L ()   03/16/18  18:28    


 


Total Protein  6.5 gm/dL (6.0-8.3)   03/16/18  18:28    


 


Albumin  3.7 gm/dL (4.2-5.5)  L  03/16/18  18:28    


 


Globulin  2.8 gm/dL  03/16/18  18:28    


 


Albumin/Globulin Ratio  1.3  (1.0-1.8)   03/16/18  18:28    


 


TSH  0.56 uIU/ml (0.34-5.60)   03/16/18  18:28    














- Physical Exam


Vitals and I&O: 


 Vital Signs











Temp  97.4 F   03/19/18 20:36


 


Pulse  74   03/19/18 20:36


 


Resp  18   03/19/18 20:36


 


BP  126/68   03/19/18 20:36


 


Pulse Ox  98   03/19/18 20:36








 Intake & Output











 03/19/18 03/19/18 03/20/18





 06:59 18:59 06:59


 


Intake Total 240 1300 240


 


Balance 240 1300 240


 


Weight (lbs)   74.389 kg


 


Intake:   


 


  Oral 240 1300 240


 


Other:   


 


  # Voids 2 4 3


 


  # Bowel Movements  1 0











Active Medications: 


Current Medications





Acetaminophen (Tylenol)  650 mg PO Q4HR PRN


   PRN Reason: Mild Pain / Temp above 100


   Stop: 05/15/18 22:05


Al Hydrox/Mg Hydrox/Simethicone (Maalox)  30 ml PO Q4HR PRN


   PRN Reason: GI DISTRESS


   Stop: 05/15/18 22:05


Divalproex Sodium (Depakote Er)  500 mg PO Q12HR DANYA


   PRN Reason: Protocol


   Stop: 05/16/18 08:59


   Last Admin: 03/19/18 20:35 Dose:  500 mg


Docusate Sodium (Colace)  100 mg PO BID DANYA


   Stop: 05/16/18 08:59


   Last Admin: 03/19/18 16:08 Dose:  100 mg


Levothyroxine Sodium (Synthroid)  0.1 mg PO QDAC DANYA


   Stop: 05/16/18 07:29


   Last Admin: 03/19/18 06:33 Dose:  0.1 mg


Lorazepam (Ativan)  0.5 mg PO Q4HR PRN; Protocol


   PRN Reason: Anxiety


   Stop: 04/15/18 22:05


Magnesium Hydroxide (Milk Of Magnesia)  30 ml PO HS PRN


   PRN Reason: Constipation


Magnesium Hydroxide (Milk Of Magnesia)  30 ml PO DAILY PRN


   PRN Reason: Constipation


   Stop: 05/15/18 23:17


Metoprolol Succinate (Toprol Xl)  50 mg PO DAILY DANYA


   Stop: 05/16/18 08:59


   Last Admin: 03/19/18 08:25 Dose:  Not Given


Olanzapine (Zyprexa)  20 mg PO HS DANYA


   PRN Reason: Protocol


   Stop: 05/16/18 20:59


   Last Admin: 03/19/18 20:35 Dose:  20 mg


Zolpidem Tartrate (Ambien)  5 mg PO HS PRN


   PRN Reason: Insomnia


   Stop: 05/15/18 22:05


   Last Admin: 03/19/18 21:18 Dose:  5 mg








General: demented


HEENT: NC/AT, PERRLA, EOMI, anicteric sclerae, throat clear


Neck: Supple, No JVD, No thyromegaly, No LAD


Lungs: CTAB


Cardiovascular: RRR, Normal S1, Normal S2, without murmur


Abdomen: soft, non-tender, non-distended


Extremities: clear


Neurological: no change





- Procedures


Procedures: 


 Procedures











Procedure Code Date


 


GROUP PSYCHOTHERAPY [id number] 05/02/16


 


GROUP PSYCHOTHERAPY [id number] 05/02/16


 


GROUP PSYCHOTHERAPY [id number] 01/18/16


 


GROUP PSYCHOTHERAPY [id number] 01/18/16


 


INDIVID PSYCHOTHERAP NEC 94.39 05/20/08


 


OTHER GROUP THERAPY 94.44 08/27/15


 


RECREATIONAL THERAPY 93.81 01/17/13














Internal Medicine Assmt/Plan





- Assessment


Assessment: 





1.htn.


2.hypothyroidisim.


3.djd.


4.dementia.





- Plan


Plan: 





continue on current medication and diet.

## 2018-03-19 NOTE — PROGRESS NOTES
DATE:  03/18/2018



PSYCHIATRIC PROGRESS NOTE



SUBJECTIVE:  Staff was spoken to.  The patient is interviewed.  Mood is noted to

be irritable.  Affect is constricted.  Insight and judgment at this time are

noted to be impaired.  Impulse control is noted to be poor.  Coping skills are

also noted to be very poor.  The patient is currently on Depakote 500 mg twice a

day and olanzapine 20 mg at bedtime.  The patient has been able to tolerate the

medication, but has been pacing most of the time, continues to be very paranoid

and has been responding to the internal stimuli.



ASSESSMENT:  The patient is still psychotic.



PLAN:  To continue the patient with the current medications and follow.





DD: 03/18/2018 13:59

DT: 03/19/2018 05:24

JOB# [id number]  [id number]

## 2018-03-20 RX ADMIN — Medication SCH TAB: at 08:14

## 2018-03-20 RX ADMIN — LEVOTHYROXINE SODIUM SCH MG: 100 TABLET ORAL at 06:32

## 2018-03-20 NOTE — CONSULTATION
DATE OF CONSULTATION:     03/19/2018



REQUESTING PHYSICIAN:  Arvind Land M.D.



TYPE OF CONSULTATION:  Psychology.



HISTORY OF PRESENT ILLNESS:  The following is by review of the medical record

and by patient's self report.  According to record review, the patient is a

72-year-old  male who is a resident of [facility].  The

patient is known to this writer from multiple previous hospitalizations as well

as from the patient's skilled nursing facility for many years.  The patient is

being admitted for acute psychosis as well as physically acting out at his

placement.  Upon interview, the patient is selectively mute.  The patient did

not answer questions about becoming agitated; however, reports from his

facility by the staff indicate that he had become easily agitated as well as

screaming, yelling and striking out at staff members and other residents.  The

patient denied any suicidal ideation or any homicidal ideation, plan or

intention.



PAST MEDICAL HISTORY:  Please see history and physical by Dr. Albert.



PAST PSYCHIATRIC HISTORY:  The patient has multiple previous hospitalizations. 

The patient is under the care of a psychiatrist and psychologist at his

placement.  The patient has a long history of schizoaffective disorder.



CURRENT MEDICATIONS:  Please see admission reconciliation.



ALLERGIES:  No known drug allergies.



SUBSTANCE ABUSE HISTORY:  None.



PSYCHOSOCIAL HISTORY:  The patient is a resident of [facility]. 

The patient does not have any family support.  The patient did not answer

questions about occupational or educational history or Hindu affiliation.



MENTAL STATUS EXAMINATION:  The patient appears to be his stated age.  The

patient's attitude is guarded and suspicious, but cooperative at times.  Eye

contact is fair to poor.  Speech is selectively mute.  Mood is irritable.  
Affect is flat.  

Thought process shows to indicate suspiciousness as well as responding to 
internal stimuli. 

The patient has not been redirectable on the unit; however, the patient does 
respond

at times to repeated attempts at redirection.  The patient denied any auditory

or visual hallucinations; however, the patient perhaps is experiencing auditory

hallucinations.  Concentration is poor.  Impulse control is inadequate. 

Sensorium is alert and oriented to person and place, but not date or time.  The

patient did not participate in the memory assessment.  Immediate memory and

short term memory are impaired.  Long-term memory is impaired.  The patient did

not participate in the interpretation of proverbs.  Insight is impaired. 

Judgment is impaired.



DIAGNOSTIC IMPRESSION:

AXIS I:

1.  History of schizoaffective disorder.

2.  Dementia with behavioral disturbance.

AXIS II:  Deferred.

AXIS III:  Please see history and physical by Dr. Albert.



TREATMENT PLAN:  The patient has been seen by Dr. Land for psychiatric

evaluation and for the management of the patient's psychotropic medications. 

The patient is continued on olanzapine 20 mg at bedtime.  We will provide

supportive psychotherapy to include a de-escalation skill as well as

cognitive and behavioral redirection.  We will provide reality orientation,

reality differentiation and reality integration.  We will provide motivational

enhancement for the patient to become compliant and stay compliant with all

aspects of his care and treatment plan.  We will provide positive reinforcement

for the patient to interact appropriately with staff members as well as other

patients here on the unit as well as at his placement.



Thank you, Dr. Land for this consult and the opportunity to participate

with you in your patient's care.





DD: 03/20/2018 08:58

DT: 03/20/2018 09:23

JOB# [id number]  [id number]

[facility]

## 2018-03-20 NOTE — INTERNAL MEDICINE PROG NOTE
Internal Medicine Subjective





- Subjective


Service Date: 03/20/18


Patient seen and examined:: with staff


Patient is:: awake, in bed


Per staff patient has:: no adverse event





Internal Medicine Objective





- Results


Result Diagrams: 


 03/16/18 18:28





 03/16/18 18:28


Recent Labs: 


 Laboratory Last Values











WBC  6.9 Th/cmm (4.8-10.8)   03/16/18  18:28    


 


RBC  4.82 Mil/cmm (3.80-5.80)   03/16/18  18:28    


 


Hgb  14.2 gm/dL (12-16)   03/16/18  18:28    


 


Hct  42.2 % (41.0-60)   03/16/18  18:28    


 


MCV  87.6 fl (80-99)   03/16/18  18:28    


 


MCH  29.5 pg (27.0-31.0)   03/16/18  18:28    


 


MCHC Differential  33.7 pg (28.0-36.0)   03/16/18  18:28    


 


RDW  13.3 % (11.5-20.0)   03/16/18  18:28    


 


Plt Count  215 Th/cmm (150-400)   03/16/18  18:28    


 


MPV  7.1 fl  03/16/18  18:28    


 


Neutrophils %  57.5 % (40.0-80.0)   03/16/18  18:28    


 


Lymphocytes %  31.7 % (20.0-50.0)   03/16/18  18:28    


 


Monocytes %  9.3 % (2.0-10.0)   03/16/18  18:28    


 


Eosinophils %  0.9 % (0.0-5.0)   03/16/18  18:28    


 


Basophils %  0.6 % (0.0-2.0)   03/16/18  18:28    


 


Sodium  131 mEq/L (136-145)  L  03/16/18  18:28    


 


Potassium  4.0 mEq/L (3.5-5.1)   03/16/18  18:28    


 


Chloride  102 mEq/L ()   03/16/18  18:28    


 


Carbon Dioxide  28.0 mEq/L (21.0-31.0)   03/16/18  18:28    


 


Anion Gap  5.0  (7.0-16.0)  L  03/16/18  18:28    


 


BUN  21 mg/dL (7-25)   03/16/18  18:28    


 


Creatinine  0.7 mg/dL (0.7-1.3)   03/16/18  18:28    


 


Est GFR ( Amer)  TNP   03/16/18  18:28    


 


Est GFR (Non-Af Amer)  TNP   03/16/18  18:28    


 


BUN/Creatinine Ratio  30.0   03/16/18  18:28    


 


Glucose  94 mg/dL ()   03/16/18  18:28    


 


Calcium  9.0 mg/dL (8.6-10.3)   03/16/18  18:28    


 


Total Bilirubin  0.5 mg/dL (0.3-1.0)   03/16/18  18:28    


 


AST  18 U/L (13-39)   03/16/18  18:28    


 


ALT  13 U/L (7-52)   03/16/18  18:28    


 


Alkaline Phosphatase  42 U/L ()   03/16/18  18:28    


 


Total Protein  6.5 gm/dL (6.0-8.3)   03/16/18  18:28    


 


Albumin  3.7 gm/dL (4.2-5.5)  L  03/16/18  18:28    


 


Globulin  2.8 gm/dL  03/16/18  18:28    


 


Albumin/Globulin Ratio  1.3  (1.0-1.8)   03/16/18  18:28    


 


TSH  0.56 uIU/ml (0.34-5.60)   03/16/18  18:28    


 


Valproic Acid  47.3 ug/mL (50.0-100.0)  L  03/20/18  19:34    














- Physical Exam


Vitals and I&O: 


 Vital Signs











Temp  97.3 F   03/20/18 20:00


 


Pulse  75   03/20/18 20:00


 


Resp  20   03/20/18 20:00


 


BP  129/62   03/20/18 20:00


 


Pulse Ox  98   03/20/18 20:00








 Intake & Output











 03/20/18 03/20/18 03/21/18





 06:59 18:59 06:59


 


Intake Total 240 1200 


 


Balance 240 1200 


 


Weight (lbs) 74.389 kg  


 


Intake:   


 


  Oral 240 1200 


 


Other:   


 


  # Voids 3 3 


 


  # Bowel Movements 1  











Active Medications: 


Current Medications





Acetaminophen (Tylenol)  650 mg PO Q4HR PRN


   PRN Reason: Mild Pain / Temp above 100


   Stop: 05/15/18 22:05


Al Hydrox/Mg Hydrox/Simethicone (Maalox)  30 ml PO Q4HR PRN


   PRN Reason: GI DISTRESS


   Stop: 05/15/18 22:05


Divalproex Sodium (Depakote Er)  500 mg PO Q12HR DANYA


   PRN Reason: Protocol


   Stop: 05/16/18 08:59


   Last Admin: 03/20/18 20:44 Dose:  500 mg


Docusate Sodium (Colace)  100 mg PO BID DANYA


   Stop: 05/16/18 08:59


   Last Admin: 03/20/18 16:12 Dose:  100 mg


Levothyroxine Sodium (Synthroid)  0.1 mg PO QDAC DANYA


   Stop: 05/16/18 07:29


   Last Admin: 03/20/18 06:32 Dose:  0.1 mg


Lorazepam (Ativan)  0.5 mg PO Q4HR PRN; Protocol


   PRN Reason: Anxiety


   Stop: 04/15/18 22:05


   Last Admin: 03/20/18 06:32 Dose:  0.5 mg


Magnesium Hydroxide (Milk Of Magnesia)  30 ml PO HS PRN


   PRN Reason: Constipation


Magnesium Hydroxide (Milk Of Magnesia)  30 ml PO DAILY PRN


   PRN Reason: Constipation


   Stop: 05/15/18 23:17


Metoprolol Succinate (Toprol Xl)  50 mg PO DAILY [facility]


   Stop: 05/16/18 08:59


   Last Admin: 03/20/18 08:14 Dose:  50 mg


Olanzapine (Zyprexa)  20 mg PO HS DANYA


   PRN Reason: Protocol


   Stop: 05/16/18 20:59


   Last Admin: 03/20/18 20:44 Dose:  20 mg


Zolpidem Tartrate (Ambien)  5 mg PO HS PRN


   PRN Reason: Insomnia


   Stop: 05/15/18 22:05


   Last Admin: 03/20/18 20:44 Dose:  5 mg








General: demented


HEENT: NC/AT, PERRLA, EOMI, anicteric sclerae, throat clear


Neck: Supple, No JVD, No thyromegaly, No LAD


Lungs: CTAB


Cardiovascular: RRR, Normal S1, Normal S2, without murmur


Abdomen: soft, non-tender, non-distended


Extremities: clear


Neurological: no change





- Procedures


Procedures: 


 Procedures











Procedure Code Date


 


GROUP PSYCHOTHERAPY [id number] 05/02/16


 


GROUP PSYCHOTHERAPY GZHZZZZ 05/02/16


 


GROUP PSYCHOTHERAPY [id number] 01/18/16


 


GROUP PSYCHOTHERAPY GZHZZZZ 01/18/16


 


INDIVID PSYCHOTHERAP NEC 94.39 05/20/08


 


OTHER GROUP THERAPY 94.44 08/27/15


 


RECREATIONAL THERAPY 93.81 01/17/13














Internal Medicine Assmt/Plan





- Assessment


Assessment: 





1.HTN


2.HYPOTHROIDISM


3.DJD.


4.DEMENTIA..








- Plan


Plan: 





continue on current medication and diet.

## 2018-03-20 NOTE — PROGRESS NOTES
DATE:  03/19/2018



SUBJECTIVE:  Staff was spoken to.  The patient is interviewed.  Mood is noted to

be irritable.  Affect is constricted.  Coping skills are noted to be extremely

poor.  Sleep and appetite are also noted to be poor.  The patient is pacing most

of the time on the unit.  No side effects to the medications are noted at this

time.



ASSESSMENT:  The patient is still psychotic.



PLAN:  To continue the patient with the current medications.  I encouraged the

patient to verbalize the concerns rather than to act out.





DD: 03/19/2018 19:03

DT: 03/20/2018 01:10

[facility]# [id number]  [id number]

## 2018-03-21 RX ADMIN — LEVOTHYROXINE SODIUM SCH MG: 100 TABLET ORAL at 06:34

## 2018-03-21 RX ADMIN — Medication SCH TAB: at 08:17

## 2018-03-21 NOTE — INTERNAL MEDICINE PROG NOTE
Internal Medicine Subjective





- Subjective


Service Date: 03/21/18


Patient seen and examined:: without staff


Patient is:: awake, in bed


Per staff patient has:: no adverse event





Internal Medicine Objective





- Results


Result Diagrams: 


 03/16/18 18:28 03/16/18 18:28


Recent Labs: 


 Laboratory Last Values











WBC  6.9 Th/cmm (4.8-10.8)   03/16/18  18:28    


 


RBC  4.82 Mil/cmm (3.80-5.80)   03/16/18  18:28    


 


Hgb  14.2 gm/dL (12-16)   03/16/18  18:28    


 


Hct  42.2 % (41.0-60)   03/16/18  18:28    


 


MCV  87.6 fl (80-99)   03/16/18  18:28    


 


MCH  29.5 pg (27.0-31.0)   03/16/18  18:28    


 


MCHC Differential  33.7 pg (28.0-36.0)   03/16/18  18:28    


 


RDW  13.3 % (11.5-20.0)   03/16/18  18:28    


 


Plt Count  215 Th/cmm (150-400)   03/16/18  18:28    


 


MPV  7.1 fl  03/16/18  18:28    


 


Neutrophils %  57.5 % (40.0-80.0)   03/16/18  18:28    


 


Lymphocytes %  31.7 % (20.0-50.0)   03/16/18  18:28    


 


Monocytes %  9.3 % (2.0-10.0)   03/16/18  18:28    


 


Eosinophils %  0.9 % (0.0-5.0)   03/16/18  18:28    


 


Basophils %  0.6 % (0.0-2.0)   03/16/18  18:28    


 


Sodium  131 mEq/L (136-145)  L  03/16/18  18:28    


 


Potassium  4.0 mEq/L (3.5-5.1)   03/16/18  18:28    


 


Chloride  102 mEq/L ()   03/16/18  18:28    


 


Carbon Dioxide  28.0 mEq/L (21.0-31.0)   03/16/18  18:28    


 


Anion Gap  5.0  (7.0-16.0)  L  03/16/18  18:28    


 


BUN  21 mg/dL (7-25)   03/16/18  18:28    


 


Creatinine  0.7 mg/dL (0.7-1.3)   03/16/18  18:28    


 


Est GFR ( Amer)  TNP   03/16/18  18:28    


 


Est GFR (Non-Af Amer)  TNP   03/16/18  18:28    


 


BUN/Creatinine Ratio  30.0   03/16/18  18:28    


 


Glucose  94 mg/dL ()   03/16/18  18:28    


 


Calcium  9.0 mg/dL (8.6-10.3)   03/16/18  18:28    


 


Total Bilirubin  0.5 mg/dL (0.3-1.0)   03/16/18  18:28    


 


AST  18 U/L (13-39)   03/16/18  18:28    


 


ALT  13 U/L (7-52)   03/16/18  18:28    


 


Alkaline Phosphatase  42 U/L ()   03/16/18  18:28    


 


Total Protein  6.5 gm/dL (6.0-8.3)   03/16/18  18:28    


 


Albumin  3.7 gm/dL (4.2-5.5)  L  03/16/18  18:28    


 


Globulin  2.8 gm/dL  03/16/18  18:28    


 


Albumin/Globulin Ratio  1.3  (1.0-1.8)   03/16/18  18:28    


 


TSH  0.56 uIU/ml (0.34-5.60)   03/16/18  18:28    


 


Valproic Acid  47.3 ug/mL (50.0-100.0)  L  03/20/18  19:34    














- Physical Exam


Vitals and I&O: 


 Vital Signs











Temp  97.7 F   03/21/18 20:00


 


Pulse  76   03/21/18 20:00


 


Resp  20   03/21/18 20:00


 


BP  129/73   03/21/18 20:00


 


Pulse Ox  97   03/21/18 20:00








 Intake & Output











 03/21/18 03/21/18 03/22/18





 06:59 18:59 06:59


 


Intake Total 120  1200


 


Balance 120  1200


 


Intake:   


 


  Oral 120  1200


 


Other:   


 


  # Voids 3  3


 


  # Bowel Movements   1











Active Medications: 


Current Medications





Acetaminophen (Tylenol)  650 mg PO Q4HR PRN


   PRN Reason: Mild Pain / Temp above 100


   Stop: 05/15/18 22:05


Al Hydrox/Mg Hydrox/Simethicone (Maalox)  30 ml PO Q4HR PRN


   PRN Reason: GI DISTRESS


   Stop: 05/15/18 22:05


Divalproex Sodium (Depakote Er)  500 mg PO Q12HR DANYA


   PRN Reason: Protocol


   Stop: 05/16/18 08:59


   Last Admin: 03/21/18 21:54 Dose:  Not Given


Docusate Sodium (Colace)  100 mg PO BID DANYA


   Stop: 05/16/18 08:59


   Last Admin: 03/21/18 16:14 Dose:  100 mg


Levothyroxine Sodium (Synthroid)  0.1 mg PO QDAC DANYA


   Stop: 05/16/18 07:29


   Last Admin: 03/21/18 06:34 Dose:  0.1 mg


Lorazepam (Ativan)  0.5 mg PO Q4HR PRN; Protocol


   PRN Reason: Anxiety


   Stop: 04/15/18 22:05


   Last Admin: 03/21/18 10:35 Dose:  0.5 mg


Magnesium Hydroxide (Milk Of Magnesia)  30 ml PO HS PRN


   PRN Reason: Constipation


Magnesium Hydroxide (Milk Of Magnesia)  30 ml PO DAILY PRN


   PRN Reason: Constipation


   Stop: 05/15/18 23:17


Metoprolol Succinate (Toprol Xl)  50 mg PO DAILY DANYA


   Stop: 05/16/18 08:59


   Last Admin: 03/21/18 08:18 Dose:  50 mg


Olanzapine (Zyprexa)  20 mg PO HS DANYA


   PRN Reason: Protocol


   Stop: 05/16/18 20:59


   Last Admin: 03/21/18 21:54 Dose:  Not Given


Zolpidem Tartrate (Ambien)  5 mg PO HS PRN


   PRN Reason: Insomnia


   Stop: 05/15/18 22:05


   Last Admin: 03/20/18 20:44 Dose:  5 mg








General: demented


HEENT: NC/AT, PERRLA, EOMI, anicteric sclerae, throat clear


Neck: Supple, No JVD, No thyromegaly, No LAD


Lungs: CTAB


Cardiovascular: RRR, Normal S1, Normal S2, without murmur


Abdomen: soft, non-tender, non-distended


Extremities: clear


Neurological: no change





- Procedures


Procedures: 


 Procedures











Procedure Code Date


 


GROUP PSYCHOTHERAPY 33989 05/02/16


 


GROUP PSYCHOTHERAPY GZHZZZZ 05/02/16


 


GROUP PSYCHOTHERAPY [id number] 01/18/16


 


GROUP PSYCHOTHERAPY GZHZZZZ 01/18/16


 


INDIVID PSYCHOTHERAP NEC 94.39 05/20/08


 


OTHER GROUP THERAPY 94.44 08/27/15


 


RECREATIONAL THERAPY 93.81 01/17/13














Internal Medicine Assmt/Plan





- Assessment


Assessment: 





1.HTN


2.HYPOTHROIDISM


3.DJD.


4.DEMENTIA..








- Plan


Plan: 





continue on current medication and diet.

## 2018-03-21 NOTE — PROGRESS NOTES
DATE:  03/20/2018



SUBJECTIVE:  Staff was spoken to.  The patient is interviewed.  Mood is noted to

be irritable.  Affect is constricted.  Insight and judgment at this time are

noted to be still impaired.  Impulse control seems to be limited.  Coping skills

are noted to be limited.  She is talking to self.  The patient is on currently

20 mg of olanzapine and has been able to tolerate the medication.  The patient

is also getting 500 mg twice a day of the Depakote.  No side effects to the

medications are noted.



ASSESSMENT:  The patient is still psychotic and impulsive.



PLAN:  To continue the patient with the current medications and follow the

patient with the supportive therapy.





DD: 03/20/2018 19:06

DT: 03/21/2018 01:21

JOB# [id number]  [id number]

## 2018-03-22 RX ADMIN — LEVOTHYROXINE SODIUM SCH MG: 100 TABLET ORAL at 06:43

## 2018-03-22 RX ADMIN — Medication SCH TAB: at 08:12

## 2018-03-22 NOTE — PROGRESS NOTES
DATE:  03/21/2018



SUBJECTIVE:  Staff was spoken to.  The patient is interviewed.  Mood is noted to

be irritable.  Affect is constricted.  Insight and judgment are noted to be

still impaired.  Impulse control is noted to be poor.  Side effects to the

medications are noted.  The patient is pacing most of the time on the unit.  The

patient is currently on the Zyprexa and Depakote and has been able to tolerate

the medication.  The patient's blood work has been requested.  The patient's

valproic acid level is noted to be 47.3.  The patient is going to be continued

with the medications and closely monitored for complaints.





DD: 03/21/2018 18:41

DT: 03/22/2018 02:51

JOB# [id number]  [id number]

## 2018-03-22 NOTE — INTERNAL MEDICINE PROG NOTE
Internal Medicine Subjective





- Subjective


Service Date: 18


Patient seen and examined:: with staff (HE FEELS BETTER)


Patient is:: awake, in bed


Per staff patient has:: no adverse event





Internal Medicine Objective





- Results


Result Diagrams: 


 18 18:28





 18 18:


Recent Labs: 


 Laboratory Last Values











WBC  6.9 Th/cmm (4.8-10.8)   18  18:    


 


RBC  4.82 Mil/cmm (3.80-5.80)   18  18:    


 


Hgb  14.2 gm/dL (12-16)   18  18:    


 


Hct  42.2 % (41.0-60)   18  18:    


 


MCV  87.6 fl (80-99)   18  18:    


 


MCH  29.5 pg (27.0-31.0)   18:    


 


MCHC Differential  33.7 pg (28.0-36.0)   18  18:    


 


RDW  13.3 % (11.5-20.0)   18  18:    


 


Plt Count  215 Th/cmm (150-400)   18  18:    


 


MPV  7.1 fl  18  18:    


 


Neutrophils %  57.5 % (40.0-80.0)   18  18:    


 


Lymphocytes %  31.7 % (20.0-50.0)   18  18:    


 


Monocytes %  9.3 % (2.0-10.0)   18  18:    


 


Eosinophils %  0.9 % (0.0-5.0)   18  18:    


 


Basophils %  0.6 % (0.0-2.0)   18  18:    


 


Sodium  131 mEq/L (136-145)  L  18  18:    


 


Potassium  4.0 mEq/L (3.5-5.1)   18  18:    


 


Chloride  102 mEq/L ()   18  18:    


 


Carbon Dioxide  28.0 mEq/L (21.0-31.0)   18  18:    


 


Anion Gap  5.0  (7.0-16.0)  L  18  18:28    


 


BUN  21 mg/dL (7-25)   18  18:28    


 


Creatinine  0.7 mg/dL (0.7-1.3)   18  18:28    


 


Est GFR ( Amer)  TNP   18  18:28    


 


Est GFR (Non-Af Amer)  TNP   18  18:28    


 


BUN/Creatinine Ratio  30.0   18  18:28    


 


Glucose  94 mg/dL ()   18  18:28    


 


Calcium  9.0 mg/dL (8.6-10.3)   18  18:28    


 


Total Bilirubin  0.5 mg/dL (0.3-1.0)   18  18:28    


 


AST  18 U/L (13-39)   18  18:28    


 


ALT  13 U/L (7-52)   18  18:28    


 


Alkaline Phosphatase  42 U/L ()   18  18:28    


 


Total Protein  6.5 gm/dL (6.0-8.3)   18  18:28    


 


Albumin  3.7 gm/dL (4.2-5.5)  L  18  18:28    


 


Globulin  2.8 gm/dL  18  18:28    


 


Albumin/Globulin Ratio  1.3  (1.0-1.8)   18  18:28    


 


TSH  0.56 uIU/ml (0.34-5.60)   18  18:28    


 


Valproic Acid  47.3 ug/mL (50.0-100.0)  L  18  19:34    














- Physical Exam


Vitals and I&O: 


 Vital Signs











Temp  97.8 F   18 20:59


 


Pulse  67   18 20:59


 


Resp  20   18 20:59


 


BP  138/67   18 20:59


 


Pulse Ox  97   18 20:59








 Intake & Output











 18





 06:59 18:59 06:59


 


Intake Total 1320 1200 340


 


Balance 1320 1200 340


 


Intake:   


 


  Oral 1320 1200 340


 


Other:   


 


  # Voids 3 3 1


 


  # Bowel Movements 1 1 











Active Medications: 


Current Medications





Acetaminophen (Tylenol)  650 mg PO Q4HR PRN


   PRN Reason: Mild Pain / Temp above 100


   Stop: 05/15/18 22:05


Al Hydrox/Mg Hydrox/Simethicone (Maalox)  30 ml PO Q4HR PRN


   PRN Reason: GI DISTRESS


   Stop: 05/15/18 22:05


Divalproex Sodium (Depakote Er)  500 mg PO Q12HR DANYA


   PRN Reason: Protocol


   Stop: 18 08:59


   Last Admin: 18 08:12 Dose:  500 mg


Docusate Sodium (Colace)  100 mg PO BID [facility]


   Stop: 18 08:59


   Last Admin: 18 16:14 Dose:  100 mg


Levothyroxine Sodium (Synthroid)  0.1 mg PO QDAC [facility]


   Stop: 18 07:29


   Last Admin: 18 06:43 Dose:  0.1 mg


Lorazepam (Ativan)  0.5 mg PO Q4HR PRN; Protocol


   PRN Reason: Anxiety


   Stop: 04/15/18 22:05


   Last Admin: 18 08:11 Dose:  0.5 mg


Magnesium Hydroxide (Milk Of Magnesia)  30 ml PO HS PRN


   PRN Reason: Constipation


Magnesium Hydroxide (Milk Of Magnesia)  30 ml PO DAILY PRN


   PRN Reason: Constipation


   Stop: 05/15/18 23:17


Metoprolol Succinate (Toprol Xl)  50 mg PO DAILY [facility]


   Stop: 18 08:59


   Last Admin: 18 08:11 Dose:  50 mg


Olanzapine (Zyprexa)  20 mg PO HS [facility]


   PRN Reason: Protocol


   Stop: 18 20:59


   Last Admin: 18 21:54 Dose:  Not Given


Zolpidem Tartrate (Ambien)  5 mg PO HS PRN


   PRN Reason: Insomnia


   Stop: 05/15/18 22:05


   Last Admin: 18 20:44 Dose:  5 mg








General: demented


HEENT: NC/AT, PERRLA, EOMI, anicteric sclerae, throat clear


Neck: Supple, No JVD, No thyromegaly, No LAD


Lungs: CTAB


Cardiovascular: RRR, Normal S1, Normal S2, without murmur


Abdomen: soft, non-tender, non-distended


Extremities: clear


Neurological: no change





- Procedures


Procedures: 


 Procedures











Procedure Code Date


 


GROUP PSYCHOTHERAPY 99397 16


 


GROUP PSYCHOTHERAPY GZHZZZZ 16


 


GROUP PSYCHOTHERAPY 40836 16


 


GROUP PSYCHOTHERAPY GZHZZZZ 16


 


INDIVID PSYCHOTHERAP NEC 94.39 08


 


OTHER GROUP THERAPY 94.44 08/27/15


 


RECREATIONAL THERAPY 93.81 13














Internal Medicine Assmt/Plan





- Assessment


Assessment: 





1.HTN


2.HYPOTHROIDISM


3.DJD.


4.DEMENTIA..








- Plan


Plan: 





continue on current medication and diet.





Nutritional Asmnt/Malnutr-PDOC





- Dietary Evaluation


Malnutrition Findings (Please click <Entered> for more info): 








Nutritional Asmnt/Malnutrition                             Start:  18 13:

30


Text:                                                      Status: Complete    

  


Freq:                                                                          

  


 Document     18 13:30  MAKENZIE  (Rec: 18 13:36  HEN  [place]-FNS[facility])


 Nutritional Asmnt/Malnutrition


     Patient General Information


      Nutritional Screening                      Low Risk


      Diagnosis                                  psychosis NOS


      Pertinent Medical Hx/Surgical Hx           HTN, hypothyroidism, dementia,


                                                 psychosis, DJD


      Subjective Information                     Per EMR PO intake %. Per


                                                 nurse notes, pt remains


                                                 withdrawn.


      Current Diet Order/ Nutrition Support      regular


      Pertinent Medications                      colace, synthorid


      Pertinent Labs                             3/16 Na 131, Alb 3.7


     Nutritional Hx/Data


      Height                                     1.8 m


      Height (Calculated Centimeters)            180.3


      Current Weight (lbs)                       74.389 kg


      Weight (Calculated Kilograms)              74.4


      Weight (Calculated Grams)                  96794.1


      Ideal Body Weight                          172


      Body Mass Index (BMI)                      22.8


      Weight Status                              Approriate


     GI Symptoms


      GI Symptoms                                None


      Last BM                                    3/21


      Difficult in:                              None


      Skin Integrity/Comment:                    intact


      Current %PO                                Good (%)


     Estimated Nutritional Goals


      BEE in Kcals:                              Using Current wt


      Calories/Kcals/Kg                          25-30


      Kcals Calculated                           7674-2246


      Protein:                                   Using Current wt


      Protein g/k


      Protein Calculated                         75


      Fluid: ml                                  1875-2250ml (1ml/kcal)


     Nutritional Problem


      No current Nutrition Prob


       Problem                                   N/A


     Intervention/Recommendation


      Comments                                   1. Continue with current diet


                                                 as ordered.


                                                 2. Monitor PO intake, wt, labs


                                                 and skin integrity


                                                 3. F/U as low risk in 7 days,


                                                 3/29


     Expected Outcomes/Goals


      Expected Outcomes/Goals                    1. PO intake to meet at least


                                                 75% of nutritional needs.


                                                 2. Wt stability, skin to


                                                 remain intact, labs to


                                                 approach WNL.

## 2018-03-23 RX ADMIN — Medication SCH TAB: at 09:13

## 2018-03-23 RX ADMIN — LEVOTHYROXINE SODIUM SCH MG: 100 TABLET ORAL at 06:41

## 2018-03-23 NOTE — PROGRESS NOTES
DATE:  03/23/2018



SUBJECTIVE:  Staff was spoken to.  The patient is interviewed.  Mood is noted to

be irritable.  Affect is constricted.  He continues to be paranoid.  Insight and

judgment at this time are noted to be still impaired.  Impulse control seems to

be limited.  The patient is pacing most of the time on the unit.  The patient is

currently on 500 mg of Depakote and also has been receiving 20 mg of the

olanzapine.  No side effects to the medications are noted.



ASSESSMENT:  The patient is still psychotic.  Valproic acid level is noted to be

47.3.



PLAN OF CARE:  The patient is going to be closely monitored.  We encouraged him

to participate in the groups and verbalize the concerns.  The patient is not

ready to be discharged to a lower level of care yet.





DD: 03/23/2018 10:41

DT: 03/23/2018 22:23

JOB# [id number]  [id number]

## 2018-03-23 NOTE — PROGRESS NOTES
DATE:  03/22/2018



SUBJECTIVE:  Staff was spoken to.  The patient is interviewed.  Mood is noted to

be irritable.  Affect is constricted.  Insight and judgment at this time is

noted to be very much impaired.  The patient is pacing in the unit.  The patient

is yelling at the staff.  The patient is still very paranoid and is talking to

self, not making any sense.  The patient is still not able to care for self and

is currently disabled and is not ready to be discharged to a lower level of care

in view of his acute psychosis.



ASSESSMENT:  The patient is still psychotic.



PLAN:  To continue the patient with supportive therapy, encourage the patient to

verbalize the concerns rather than to act out.





DD: 03/22/2018 21:26

DT: 03/23/2018 04:14

[facility]# [id number]  [id number]

## 2018-03-23 NOTE — INTERNAL MEDICINE PROG NOTE
Internal Medicine Subjective





- Subjective


Service Date: 18


Patient seen and examined:: with staff


Patient is:: awake, in bed


Per staff patient has:: no adverse event





Internal Medicine Objective





- Results


Result Diagrams: 


 18 18:18 18:


Recent Labs: 


 Laboratory Last Values











WBC  6.9 Th/cmm (4.8-10.8)   18  18:    


 


RBC  4.82 Mil/cmm (3.80-5.80)   18  18:    


 


Hgb  14.2 gm/dL (12-16)   18  18:    


 


Hct  42.2 % (41.0-60)   18  18:    


 


MCV  87.6 fl (80-99)   18  18:    


 


MCH  29.5 pg (27.0-31.0)   18  18    


 


MCHC Differential  33.7 pg (28.0-36.0)   18  18:    


 


RDW  13.3 % (11.5-20.0)   18  18:    


 


Plt Count  215 Th/cmm (150-400)   18  18:    


 


MPV  7.1 fl  18  18:    


 


Neutrophils %  57.5 % (40.0-80.0)   18  18:    


 


Lymphocytes %  31.7 % (20.0-50.0)   18  18:    


 


Monocytes %  9.3 % (2.0-10.0)   18  18:    


 


Eosinophils %  0.9 % (0.0-5.0)   18  18:    


 


Basophils %  0.6 % (0.0-2.0)   18  18:    


 


Sodium  131 mEq/L (136-145)  L  18  18:    


 


Potassium  4.0 mEq/L (3.5-5.1)   18  18:    


 


Chloride  102 mEq/L ()   18  18:    


 


Carbon Dioxide  28.0 mEq/L (21.0-31.0)   18  18:    


 


Anion Gap  5.0  (7.0-16.0)  L  03/16/18  18:28    


 


BUN  21 mg/dL (7-25)   18  18:28    


 


Creatinine  0.7 mg/dL (0.7-1.3)   18  18:28    


 


Est GFR ( Amer)  TNP   18  18:28    


 


Est GFR (Non-Af Amer)  TNP   18  18:28    


 


BUN/Creatinine Ratio  30.0   18  18:28    


 


Glucose  94 mg/dL ()   18  18:28    


 


Calcium  9.0 mg/dL (8.6-10.3)   18  18:28    


 


Total Bilirubin  0.5 mg/dL (0.3-1.0)   18  18:28    


 


AST  18 U/L (13-39)   18  18:28    


 


ALT  13 U/L (7-52)   18  18:28    


 


Alkaline Phosphatase  42 U/L ()   18  18:28    


 


Total Protein  6.5 gm/dL (6.0-8.3)   18  18:28    


 


Albumin  3.7 gm/dL (4.2-5.5)  L  18  18:28    


 


Globulin  2.8 gm/dL  18  18:28    


 


Albumin/Globulin Ratio  1.3  (1.0-1.8)   18  18:28    


 


TSH  0.56 uIU/ml (0.34-5.60)   18  18:28    


 


Valproic Acid  47.3 ug/mL (50.0-100.0)  L  18  19:34    














- Physical Exam


Vitals and I&O: 


 Vital Signs











Temp  97.6 F   18 20:34


 


Pulse  75   18 20:34


 


Resp  18   18 20:34


 


BP  151/79   18 20:34


 


Pulse Ox  94   18 20:34








 Intake & Output











 1818





 06:59 18:59 06:59


 


Intake Total 340 950 240


 


Balance 340 950 240


 


Intake:   


 


  Oral 340 950 240


 


Other:   


 


  # Voids 1 4 2


 


  # Bowel Movements  1 











Active Medications: 


Current Medications





Acetaminophen (Tylenol)  650 mg PO Q4HR PRN


   PRN Reason: Mild Pain / Temp above 100


   Stop: 05/15/18 22:05


Al Hydrox/Mg Hydrox/Simethicone (Maalox)  30 ml PO Q4HR PRN


   PRN Reason: GI DISTRESS


   Stop: 05/15/18 22:05


Divalproex Sodium (Depakote Er)  500 mg PO Q12HR [facility]


   PRN Reason: Protocol


   Stop: 18 08:59


   Last Admin: 18 21:22 Dose:  500 mg


Docusate Sodium (Colace)  100 mg PO BID [facility]


   Stop: 18 08:59


   Last Admin: 18 17:35 Dose:  Not Given


Levothyroxine Sodium (Synthroid)  0.1 mg PO QDAC [facility]


   Stop: 18 07:29


   Last Admin: 18 06:41 Dose:  0.1 mg


Lorazepam (Ativan)  0.5 mg PO Q4HR PRN; Protocol


   PRN Reason: Anxiety


   Stop: 04/15/18 22:05


   Last Admin: 18 06:41 Dose:  0.5 mg


Magnesium Hydroxide (Milk Of Magnesia)  30 ml PO HS PRN


   PRN Reason: Constipation


Magnesium Hydroxide (Milk Of Magnesia)  30 ml PO DAILY PRN


   PRN Reason: Constipation


   Stop: 05/15/18 23:17


Metoprolol Succinate (Toprol Xl)  50 mg PO DAILY [facility]


   Stop: 18 08:59


   Last Admin: 18 09:13 Dose:  50 mg


Olanzapine (Zyprexa)  20 mg PO HS [facility]ANY[facility]


   PRN Reason: Protocol


   Stop: 18 20:59


   Last Admin: 18 21:23 Dose:  20 mg


Zolpidem Tartrate (Ambien)  5 mg PO HS PRN


   PRN Reason: Insomnia


   Stop: 05/15/18 22:05


   Last Admin: 18 23:28 Dose:  5 mg








General: demented


HEENT: NC/AT, PERRLA, EOMI, anicteric sclerae, throat clear


Neck: Supple, No JVD, No thyromegaly, No LAD


Lungs: CTAB


Cardiovascular: RRR, Normal S1, Normal S2, without murmur


Abdomen: soft, non-tender, non-distended


Extremities: clear


Neurological: no change





- Procedures


Procedures: 


 Procedures











Procedure Code Date


 


GROUP PSYCHOTHERAPY 52291 16


 


GROUP PSYCHOTHERAPY GZHZZZZ 16


 


GROUP PSYCHOTHERAPY 06861 16


 


GROUP PSYCHOTHERAPY GZHZZZZ 16


 


INDIVID PSYCHOTHERAP NEC 94.39 08


 


OTHER GROUP THERAPY 94.44 08/27/15


 


RECREATIONAL THERAPY 93.81 13














Internal Medicine Assmt/Plan





- Assessment


Assessment: 





1.HTN


2.HYPOTHROIDISM


3.DJD.


4.DEMENTIA..








- Plan


Plan: 





continue on current medication and diet.





Nutritional Asmnt/Malnutr-PDOC





- Dietary Evaluation


Malnutrition Findings (Please click <Entered> for more info): 








Nutritional Asmnt/Malnutrition                             Start:  18 13:

30


Text:                                                      Status: Complete    

  


Freq:                                                                          

  


 Document     18 13:30  GLORIA  (Rec: 18 13:36  YOVANI  [place]-FNS1)


 Nutritional Asmnt/Malnutrition


     Patient General Information


      Nutritional Screening                      Low Risk


      Diagnosis                                  psychosis NOS


      Pertinent Medical Hx/Surgical Hx           HTN, hypothyroidism, dementia,


                                                 psychosis, DJD


      Subjective Information                     Per EMR PO intake %. Per


                                                 nurse notes, pt remains


                                                 withdrawn.


      Current Diet Order/ Nutrition Support      regular


      Pertinent Medications                      colace, synthorid


      Pertinent Labs                             3/16 Na 131, Alb 3.7


     Nutritional Hx/Data


      Height                                     1.8 m


      Height (Calculated Centimeters)            180.3


      Current Weight (lbs)                       74.389 kg


      Weight (Calculated Kilograms)              74.4


      Weight (Calculated Grams)                  99862.1


      Ideal Body Weight                          172


      Body Mass Index (BMI)                      22.8


      Weight Status                              Approriate


     GI Symptoms


      GI Symptoms                                None


      Last BM                                    3/21


      Difficult in:                              None


      Skin Integrity/Comment:                    intact


      Current %PO                                Good (%)


     Estimated Nutritional Goals


      BEE in Kcals:                              Using Current wt


      Calories/Kcals/Kg                          25-30


      Kcals Calculated                           8591-1679


      Protein:                                   Using Current wt


      Protein g/k


      Protein Calculated                         75


      Fluid: ml                                  1875-2250ml (1ml/kcal)


     Nutritional Problem


      No current Nutrition Prob


       Problem                                   N/A


     Intervention/Recommendation


      Comments                                   1. Continue with current diet


                                                 as ordered.


                                                 2. Monitor PO intake, wt, labs


                                                 and skin integrity


                                                 3. F/U as low risk in 7 days,


                                                 3/29


     Expected Outcomes/Goals


      Expected Outcomes/Goals                    1. PO intake to meet at least


                                                 75% of nutritional needs.


                                                 2. Wt stability, skin to


                                                 remain intact, labs to


                                                 approach WNL.

## 2018-03-24 RX ADMIN — Medication SCH TAB: at 08:23

## 2018-03-24 RX ADMIN — LEVOTHYROXINE SODIUM SCH MG: 100 TABLET ORAL at 06:30

## 2018-03-24 NOTE — PROGRESS NOTES
DATE:  03/24/2018



PSYCHIATRIC PROGRESS NOTE



SUBJECTIVE:  Staff was spoken to.  The patient is interviewed.  The patient is

pacing on the unit.  Insight and judgment are noted to be still impaired. 

Impulse control is noted to be limited.  Coping skills are noted to be limited. 

The patient has been having difficult time.  The patient is still responding to

internal stimuli, but aggressive behavior has been coming down.  His Depakote

has been increased to 500 mg twice a day yesterday.



ASSESSMENT:  The patient is still paranoid and has been pacing most of the time

on the unit.



PLAN:  To continue the patient with the supportive therapy and followup.





DD: 03/24/2018 12:50

DT: 03/24/2018 15:41

JOB# [id number]  [id number]

## 2018-03-24 NOTE — INTERNAL MEDICINE PROG NOTE
Internal Medicine Subjective





- Subjective


Service Date: 18


Patient seen and examined:: with staff


Patient is:: awake, in bed


Per staff patient has:: no adverse event





Internal Medicine Objective





- Results


Result Diagrams: 


 18 18:18 18:


Recent Labs: 


 Laboratory Last Values











WBC  6.9 Th/cmm (4.8-10.8)   18  18:    


 


RBC  4.82 Mil/cmm (3.80-5.80)   18  18:    


 


Hgb  14.2 gm/dL (12-16)   18  18:    


 


Hct  42.2 % (41.0-60)   18  18:    


 


MCV  87.6 fl (80-99)   18  18:    


 


MCH  29.5 pg (27.0-31.0)   18  18    


 


MCHC Differential  33.7 pg (28.0-36.0)   18  18:    


 


RDW  13.3 % (11.5-20.0)   18  18:    


 


Plt Count  215 Th/cmm (150-400)   18  18:    


 


MPV  7.1 fl  18  18:    


 


Neutrophils %  57.5 % (40.0-80.0)   18  18:    


 


Lymphocytes %  31.7 % (20.0-50.0)   18  18:    


 


Monocytes %  9.3 % (2.0-10.0)   18:    


 


Eosinophils %  0.9 % (0.0-5.0)   18  18:    


 


Basophils %  0.6 % (0.0-2.0)   18  18:    


 


Sodium  131 mEq/L (136-145)  L  18  18:    


 


Potassium  4.0 mEq/L (3.5-5.1)   18  18:    


 


Chloride  102 mEq/L ()   18  18:    


 


Carbon Dioxide  28.0 mEq/L (21.0-31.0)   18  18:    


 


Anion Gap  5.0  (7.0-16.0)  L  03/16/18  18:28    


 


BUN  21 mg/dL (7-25)   18  18:28    


 


Creatinine  0.7 mg/dL (0.7-1.3)   18  18:28    


 


Est GFR ( Amer)  TNP   18  18:28    


 


Est GFR (Non-Af Amer)  TNP   18  18:28    


 


BUN/Creatinine Ratio  30.0   18  18:28    


 


Glucose  94 mg/dL ()   18  18:28    


 


Calcium  9.0 mg/dL (8.6-10.3)   18  18:28    


 


Total Bilirubin  0.5 mg/dL (0.3-1.0)   18  18:28    


 


AST  18 U/L (13-39)   18  18:28    


 


ALT  13 U/L (7-52)   18  18:28    


 


Alkaline Phosphatase  42 U/L ()   18  18:28    


 


Total Protein  6.5 gm/dL (6.0-8.3)   18  18:28    


 


Albumin  3.7 gm/dL (4.2-5.5)  L  18  18:28    


 


Globulin  2.8 gm/dL  18  18:28    


 


Albumin/Globulin Ratio  1.3  (1.0-1.8)   18  18:28    


 


TSH  0.56 uIU/ml (0.34-5.60)   18  18:28    


 


Valproic Acid  47.3 ug/mL (50.0-100.0)  L  18  19:34    














- Physical Exam


Vitals and I&O: 


 Vital Signs











Temp  97.9 F   18 06:44


 


Pulse  67   18 08:23


 


Resp  19   18 06:44


 


BP  143/76   18 08:23


 


Pulse Ox  96   18 06:44








 Intake & Output











 18





 18:59 06:59 18:59


 


Intake Total 950 480 


 


Balance 950 480 


 


Intake:   


 


  Oral 950 480 


 


Other:   


 


  # Voids 4 1 


 


  # Bowel Movements 1  











Active Medications: 


Current Medications





Acetaminophen (Tylenol)  650 mg PO Q4HR PRN


   PRN Reason: Mild Pain / Temp above 100


   Stop: 05/15/18 22:05


Al Hydrox/Mg Hydrox/Simethicone (Maalox)  30 ml PO Q4HR PRN


   PRN Reason: GI DISTRESS


   Stop: 05/15/18 22:05


Divalproex Sodium (Depakote Er)  500 mg PO Q12HR DANYA


   PRN Reason: Protocol


   Stop: 18 08:59


   Last Admin: 18 08:22 Dose:  500 mg


Docusate Sodium (Colace)  100 mg PO BID DANYA


   Stop: 18 08:59


   Last Admin: 18 08:23 Dose:  100 mg


Levothyroxine Sodium (Synthroid)  0.1 mg PO QDAC DANYA


   Stop: 18 07:29


   Last Admin: 18 06:30 Dose:  0.1 mg


Magnesium Hydroxide (Milk Of Magnesia)  30 ml PO HS PRN


   PRN Reason: Constipation


Magnesium Hydroxide (Milk Of Magnesia)  30 ml PO DAILY PRN


   PRN Reason: Constipation


   Stop: 05/15/18 23:17


Metoprolol Succinate (Toprol Xl)  50 mg PO DAILY [facility]


   Stop: 18 08:59


   Last Admin: 18 08:23 Dose:  50 mg


Olanzapine (Zyprexa)  20 mg PO HS DANYA


   PRN Reason: Protocol


   Stop: 18 20:59


   Last Admin: 18 21:23 Dose:  20 mg








General: demented


HEENT: NC/AT, PERRLA, EOMI, anicteric sclerae, throat clear


Neck: Supple, No JVD, No thyromegaly, No LAD


Lungs: CTAB


Cardiovascular: RRR, Normal S1, Normal S2, without murmur


Abdomen: soft, non-tender, non-distended


Extremities: clear


Neurological: no change





- Procedures


Procedures: 


 Procedures











Procedure Code Date


 


GROUP PSYCHOTHERAPY 89288 16


 


GROUP PSYCHOTHERAPY GZHZZZZ 16


 


GROUP PSYCHOTHERAPY 22903 16


 


GROUP PSYCHOTHERAPY GZHZZZZ 16


 


INDIVID PSYCHOTHERAP NEC 94.39 08


 


OTHER GROUP THERAPY 94.44 08/27/15


 


RECREATIONAL THERAPY 93.81 13














Internal Medicine Assmt/Plan





- Assessment


Assessment: 





1.HTN


2.HYPOTHROIDISM


3.DJD.


4.DEMENTIA..








- Plan


Plan: 





continue on current medication and diet.





Nutritional Asmnt/Malnutr-PDOC





- Dietary Evaluation


Malnutrition Findings (Please click <Entered> for more info): 








Nutritional Asmnt/Malnutrition                             Start:  18 13:

30


Text:                                                      Status: Complete    

  


Freq:                                                                          

  


 Document     18 13:30  GLORIA  (Rec: 18 13:36  [email or web address]  [email or web address])


 Nutritional Asmnt/Malnutrition


     Patient General Information


      Nutritional Screening                      Low Risk


      Diagnosis                                  psychosis NOS


      Pertinent Medical Hx/Surgical Hx           HTN, hypothyroidism, dementia,


                                                 psychosis, DJD


      Subjective Information                     Per EMR PO intake %. Per


                                                 nurse notes, pt remains


                                                 withdrawn.


      Current Diet Order/ Nutrition Support      regular


      Pertinent Medications                      colace, synthorid


      Pertinent Labs                             3/16 Na 131, Alb 3.7


     Nutritional Hx/Data


      Height                                     1.8 m


      Height (Calculated Centimeters)            180.3


      Current Weight (lbs)                       74.389 kg


      Weight (Calculated Kilograms)              74.4


      Weight (Calculated Grams)                  16811.1


      Ideal Body Weight                          172


      Body Mass Index (BMI)                      22.8


      Weight Status                              Approriate


     GI Symptoms


      GI Symptoms                                None


      Last BM                                    3/21


      Difficult in:                              None


      Skin Integrity/Comment:                    intact


      Current %PO                                Good (%)


     Estimated Nutritional Goals


      BEE in Kcals:                              Using Current wt


      Calories/Kcals/Kg                          25-30


      Kcals Calculated                           3814-8567


      Protein:                                   Using Current wt


      Protein g/k


      Protein Calculated                         75


      Fluid: ml                                  1875-2250ml (1ml/kcal)


     Nutritional Problem


      No current Nutrition Prob


       Problem                                   N/A


     Intervention/Recommendation


      Comments                                   1. Continue with current diet


                                                 as ordered.


                                                 2. Monitor PO intake, wt, labs


                                                 and skin integrity


                                                 3. F/U as low risk in 7 days,


                                                 3/29


     Expected Outcomes/Goals


      Expected Outcomes/Goals                    1. PO intake to meet at least


                                                 75% of nutritional needs.


                                                 2. Wt stability, skin to


                                                 remain intact, labs to


                                                 approach WNL.

## 2018-03-25 RX ADMIN — Medication SCH TAB: at 09:19

## 2018-03-25 RX ADMIN — LEVOTHYROXINE SODIUM SCH MG: 100 TABLET ORAL at 06:51

## 2018-03-25 NOTE — PROGRESS NOTES
DATE:  03/25/2018



SUBJECTIVE:  Staff was spoken to.  The patient is interviewed.  Mood is noted to

be irritable.  Affect is constricted.  The patient is pacing most of the time. 

The patient has been having drooling of saliva from the corner of the mouth.  An

EPSE suspected and the patient has been given the Cogentin.



ASSESSMENT:  The patient is still psychotic.



PLAN:  Continue the patient with Zyprexa and follow the patient with the

supportive therapy.





DD: 03/25/2018 14:17

DT: 03/25/2018 17:04

JOB# [id number]  [id number]

## 2018-03-26 RX ADMIN — Medication SCH TAB: at 09:16

## 2018-03-26 RX ADMIN — LEVOTHYROXINE SODIUM SCH MG: 100 TABLET ORAL at 06:34

## 2018-03-26 NOTE — INTERNAL MEDICINE PROG NOTE
Internal Medicine Subjective





- Subjective


Service Date: 18


Patient seen and examined:: without staff


Patient is:: awake, in bed


Per staff patient has:: no adverse event





Internal Medicine Objective





- Results


Result Diagrams: 


 18 18:28





 18 18:


Recent Labs: 


 Laboratory Last Values











WBC  6.9 Th/cmm (4.8-10.8)   18  18:    


 


RBC  4.82 Mil/cmm (3.80-5.80)   18  18:    


 


Hgb  14.2 gm/dL (12-16)   18  18:    


 


Hct  42.2 % (41.0-60)   18  18:    


 


MCV  87.6 fl (80-99)   18  18:    


 


MCH  29.5 pg (27.0-31.0)   18  18    


 


MCHC Differential  33.7 pg (28.0-36.0)   18  18:    


 


RDW  13.3 % (11.5-20.0)   18  18:    


 


Plt Count  215 Th/cmm (150-400)   18  18:    


 


MPV  7.1 fl  18  18:    


 


Neutrophils %  57.5 % (40.0-80.0)   18  18:    


 


Lymphocytes %  31.7 % (20.0-50.0)   18  18:    


 


Monocytes %  9.3 % (2.0-10.0)   18  18:    


 


Eosinophils %  0.9 % (0.0-5.0)   18  18:    


 


Basophils %  0.6 % (0.0-2.0)   18  18:    


 


Sodium  131 mEq/L (136-145)  L  18  18:    


 


Potassium  4.0 mEq/L (3.5-5.1)   18  18:    


 


Chloride  102 mEq/L ()   18  18:    


 


Carbon Dioxide  28.0 mEq/L (21.0-31.0)   18  18:    


 


Anion Gap  5.0  (7.0-16.0)  L  03/16/18  18:28    


 


BUN  21 mg/dL (7-25)   18  18:28    


 


Creatinine  0.7 mg/dL (0.7-1.3)   18  18:28    


 


Est GFR ( Amer)  TNP   18  18:28    


 


Est GFR (Non-Af Amer)  TNP   18  18:28    


 


BUN/Creatinine Ratio  30.0   18  18:28    


 


Glucose  94 mg/dL ()   18  18:28    


 


Calcium  9.0 mg/dL (8.6-10.3)   18  18:28    


 


Total Bilirubin  0.5 mg/dL (0.3-1.0)   18  18:28    


 


AST  18 U/L (13-39)   18  18:28    


 


ALT  13 U/L (7-52)   18  18:28    


 


Alkaline Phosphatase  42 U/L ()   18  18:28    


 


Total Protein  6.5 gm/dL (6.0-8.3)   18  18:28    


 


Albumin  3.7 gm/dL (4.2-5.5)  L  18  18:28    


 


Globulin  2.8 gm/dL  18  18:28    


 


Albumin/Globulin Ratio  1.3  (1.0-1.8)   18  18:28    


 


TSH  0.56 uIU/ml (0.34-5.60)   18  18:28    


 


Valproic Acid  47.3 ug/mL (50.0-100.0)  L  18  19:34    














- Physical Exam


Vitals and I&O: 


 Vital Signs











Temp  97.6 F   18 20:12


 


Pulse  63   18 20:12


 


Resp  18   18 20:12


 


BP  140/86   18 20:12


 


Pulse Ox  96   18 20:12








 Intake & Output











 18





 06:59 18:59 06:59


 


Intake Total 360 1000 240


 


Balance 360 1000 240


 


Weight (lbs) 74.389 kg  


 


Intake:   


 


  Oral 360 1000 240


 


Other:   


 


  # Voids 1 4 1


 


  # Bowel Movements 0 1 


 


  Weight Source Bedscale  











Active Medications: 


Current Medications





Acetaminophen (Tylenol)  650 mg PO Q4HR PRN


   PRN Reason: Mild Pain / Temp above 100


   Stop: 05/15/18 22:05


Al Hydrox/Mg Hydrox/Simethicone (Maalox)  30 ml PO Q4HR PRN


   PRN Reason: GI DISTRESS


   Stop: 05/15/18 22:05


Benztropine Mesylate (Cogentin)  1 mg PO BID PRN


   PRN Reason: extrapyramidal side effects


   Stop: 18 08:50


   Last Admin: 18 10:40 Dose:  1 mg


Divalproex Sodium (Depakote Er)  500 mg PO Q12HR DANYA


   PRN Reason: Protocol


   Stop: 18 08:59


   Last Admin: 18 20:47 Dose:  500 mg


Docusate Sodium (Colace)  100 mg PO BID DANYA


   Stop: 18 08:59


   Last Admin: 18 16:40 Dose:  100 mg


Levothyroxine Sodium (Synthroid)  0.1 mg PO QDAC DANYA


   Stop: 18 07:29


   Last Admin: 18 06:34 Dose:  0.1 mg


Lorazepam (Ativan)  0.5 mg PO Q4HR PRN; Protocol


   PRN Reason: Agitation


   Stop: 18 08:59


   Last Admin: 18 20:47 Dose:  0.5 mg


Magnesium Hydroxide (Milk Of Magnesia)  30 ml PO HS PRN


   PRN Reason: Constipation


Magnesium Hydroxide (Milk Of Magnesia)  30 ml PO DAILY PRN


   PRN Reason: Constipation


   Stop: 05/15/18 23:17


Metoprolol Succinate (Toprol Xl)  50 mg PO DAILY DANYA


   Stop: 18 08:59


   Last Admin: 18 09:17 Dose:  50 mg


Olanzapine (Zyprexa)  20 mg PO HS DANYA


   PRN Reason: Protocol


   Stop: 18 20:59


   Last Admin: 18 20:46 Dose:  20 mg








General: demented


HEENT: NC/AT, PERRLA, EOMI, anicteric sclerae, throat clear


Neck: Supple, No JVD, No thyromegaly, No LAD


Lungs: CTAB


Cardiovascular: RRR, Normal S1, Normal S2, without murmur


Abdomen: soft, non-tender, non-distended


Extremities: clear


Neurological: no change





- Procedures


Procedures: 


 Procedures











Procedure Code Date


 


GROUP PSYCHOTHERAPY 39552 16


 


GROUP PSYCHOTHERAPY GZHZZZZ 16


 


GROUP PSYCHOTHERAPY 18884 16


 


GROUP PSYCHOTHERAPY GZHZZZZ 16


 


INDIVID PSYCHOTHERAP NEC 94.39 08


 


OTHER GROUP THERAPY 94.44 08/27/15


 


RECREATIONAL THERAPY 93.81 13














Internal Medicine Assmt/Plan





- Assessment


Assessment: 





1.HTN


2.HYPOTHROIDISM


3.DJD.


4.DEMENTIA..








- Plan


Plan: 





continue on current medication and diet.





Nutritional Asmnt/Malnutr-PDOC





- Dietary Evaluation


Malnutrition Findings (Please click <Entered> for more info): 








Nutritional Asmnt/Malnutrition                             Start:  18 13:

30


Text:                                                      Status: Complete    

  


Freq:                                                                          

  


 Document     18 13:30  GLORIA  (Rec: 18 13:36  LCMAKENZIEG  [place]-[facility])


 Nutritional Asmnt/Malnutrition


     Patient General Information


      Nutritional Screening                      Low Risk


      Diagnosis                                  psychosis NOS


      Pertinent Medical Hx/Surgical Hx           HTN, hypothyroidism, dementia,


                                                 psychosis, DJD


      Subjective Information                     Per EMR PO intake %. Per


                                                 nurse notes, pt remains


                                                 withdrawn.


      Current Diet Order/ Nutrition Support      regular


      Pertinent Medications                      colace, synthorid


      Pertinent Labs                             3/16 Na 131, Alb 3.7


     Nutritional Hx/Data


      Height                                     1.8 m


      Height (Calculated Centimeters)            180.3


      Current Weight (lbs)                       74.389 kg


      Weight (Calculated Kilograms)              74.4


      Weight (Calculated Grams)                  21744.1


      Ideal Body Weight                          172


      Body Mass Index (BMI)                      22.8


      Weight Status                              Approriate


     GI Symptoms


      GI Symptoms                                None


      Last BM                                    3/21


      Difficult in:                              None


      Skin Integrity/Comment:                    intact


      Current %PO                                Good (%)


     Estimated Nutritional Goals


      BEE in Kcals:                              Using Current wt


      Calories/Kcals/Kg                          25-30


      Kcals Calculated                           2991-5987


      Protein:                                   Using Current wt


      Protein g/k


      Protein Calculated                         75


      Fluid: ml                                  1875-2250ml (1ml/kcal)


     Nutritional Problem


      No current Nutrition Prob


       Problem                                   N/A


     Intervention/Recommendation


      Comments                                   1. Continue with current diet


                                                 as ordered.


                                                 2. Monitor PO intake, wt, labs


                                                 and skin integrity


                                                 3. F/U as low risk in 7 days,


                                                 3/29


     Expected Outcomes/Goals


      Expected Outcomes/Goals                    1. PO intake to meet at least


                                                 75% of nutritional needs.


                                                 2. Wt stability, skin to


                                                 remain intact, labs to


                                                 approach WNL.

## 2018-03-27 RX ADMIN — Medication SCH TAB: at 09:17

## 2018-03-27 RX ADMIN — LEVOTHYROXINE SODIUM SCH MG: 100 TABLET ORAL at 06:32

## 2018-03-27 NOTE — PROGRESS NOTES
DATE:  03/26/2018



SUBJECTIVE:  Staff was spoken to.  The patient is interviewed.  Mood is anxious.

 Affect is appropriate.  The patient has been having paranoid delusions, but

pacing most of the time on the unit.  The patient needs to be some antianxiety

medications.  No side effects to the medications are noted.  The patient is not

aggressive at this time.



ASSESSMENT:  The patient's psychosis is resolving.



PLAN:  To continue the patient with the current medications and followup.





DD: 03/26/2018 19:27

DT: 03/27/2018 01:29

JOB# [id number]  [id number]

## 2018-03-27 NOTE — INTERNAL MEDICINE PROG NOTE
Internal Medicine Subjective





- Subjective


Service Date: 18


Patient seen and examined:: with staff


Patient is:: awake, in bed


Per staff patient has:: no adverse event





Internal Medicine Objective





- Results


Result Diagrams: 


 18 18:28





 18 18:


Recent Labs: 


 Laboratory Last Values











WBC  6.9 Th/cmm (4.8-10.8)   18  18:    


 


RBC  4.82 Mil/cmm (3.80-5.80)   18  18:    


 


Hgb  14.2 gm/dL (12-16)   18  18:    


 


Hct  42.2 % (41.0-60)   18  18:    


 


MCV  87.6 fl (80-99)   18  18:    


 


MCH  29.5 pg (27.0-31.0)   18  18    


 


MCHC Differential  33.7 pg (28.0-36.0)   18  18:    


 


RDW  13.3 % (11.5-20.0)   18  18:    


 


Plt Count  215 Th/cmm (150-400)   18  18:    


 


MPV  7.1 fl  18  18:    


 


Neutrophils %  57.5 % (40.0-80.0)   18  18:    


 


Lymphocytes %  31.7 % (20.0-50.0)   18  18:    


 


Monocytes %  9.3 % (2.0-10.0)   18  18:    


 


Eosinophils %  0.9 % (0.0-5.0)   18  18:    


 


Basophils %  0.6 % (0.0-2.0)   18  18:    


 


Sodium  131 mEq/L (136-145)  L  18  18:    


 


Potassium  4.0 mEq/L (3.5-5.1)   18  18:    


 


Chloride  102 mEq/L ()   18  18:    


 


Carbon Dioxide  28.0 mEq/L (21.0-31.0)   18  18:    


 


Anion Gap  5.0  (7.0-16.0)  L  03/16/18  18:28    


 


BUN  21 mg/dL (7-25)   18  18:28    


 


Creatinine  0.7 mg/dL (0.7-1.3)   18  18:28    


 


Est GFR ( Amer)  TNP   18  18:28    


 


Est GFR (Non-Af Amer)  TNP   18  18:28    


 


BUN/Creatinine Ratio  30.0   18  18:28    


 


Glucose  94 mg/dL ()   18  18:28    


 


Calcium  9.0 mg/dL (8.6-10.3)   18  18:28    


 


Total Bilirubin  0.5 mg/dL (0.3-1.0)   18  18:28    


 


AST  18 U/L (13-39)   18  18:28    


 


ALT  13 U/L (7-52)   18  18:28    


 


Alkaline Phosphatase  42 U/L ()   18  18:28    


 


Total Protein  6.5 gm/dL (6.0-8.3)   18  18:28    


 


Albumin  3.7 gm/dL (4.2-5.5)  L  18  18:28    


 


Globulin  2.8 gm/dL  18  18:28    


 


Albumin/Globulin Ratio  1.3  (1.0-1.8)   18  18:28    


 


TSH  0.56 uIU/ml (0.34-5.60)   18  18:28    


 


Valproic Acid  47.3 ug/mL (50.0-100.0)  L  18  19:34    














- Physical Exam


Vitals and I&O: 


 Vital Signs











Temp  96.8 F   18 15:30


 


Pulse  60   18 15:30


 


Resp  20   18 15:30


 


BP  141/66   18 15:30


 


Pulse Ox  97   18 15:30








 Intake & Output











 18





 06:59 18:59 06:59


 


Intake Total 240 1000 


 


Balance 240 1000 


 


Weight (lbs) 74.389 kg  


 


Intake:   


 


  Oral 240 1000 


 


Other:   


 


  # Voids 3 4 


 


  # Bowel Movements 0 1 


 


  Weight Source Bedscale  











Active Medications: 


Current Medications





Acetaminophen (Tylenol)  650 mg PO Q4HR PRN


   PRN Reason: Mild Pain / Temp above 100


   Stop: 05/15/18 22:05


Al Hydrox/Mg Hydrox/Simethicone (Maalox)  30 ml PO Q4HR PRN


   PRN Reason: GI DISTRESS


   Stop: 05/15/18 22:05


Benztropine Mesylate (Cogentin)  1 mg PO BID PRN


   PRN Reason: extrapyramidal side effects


   Stop: 18 08:50


   Last Admin: 18 10:40 Dose:  1 mg


Divalproex Sodium (Depakote Er)  500 mg PO Q12HR DANYA


   PRN Reason: Protocol


   Stop: 18 08:59


   Last Admin: 18 09:16 Dose:  500 mg


Docusate Sodium (Colace)  100 mg PO BID DANYA


   Stop: 18 08:59


   Last Admin: 18 17:14 Dose:  100 mg


Levothyroxine Sodium (Synthroid)  0.1 mg PO QDAC DANYA


   Stop: 18 07:29


   Last Admin: 18 06:32 Dose:  0.1 mg


Lorazepam (Ativan)  0.5 mg PO Q4HR PRN; Protocol


   PRN Reason: Agitation


   Stop: 18 08:59


   Last Admin: 18 20:47 Dose:  0.5 mg


Magnesium Hydroxide (Milk Of Magnesia)  30 ml PO HS PRN


   PRN Reason: Constipation


Magnesium Hydroxide (Milk Of Magnesia)  30 ml PO DAILY PRN


   PRN Reason: Constipation


   Stop: 05/15/18 23:17


Metoprolol Succinate (Toprol Xl)  50 mg PO DAILY DANYA


   Stop: 18 08:59


   Last Admin: 18 09:17 Dose:  50 mg


Olanzapine (Zyprexa)  20 mg PO HS DANYA


   PRN Reason: Protocol


   Stop: 18 20:59


   Last Admin: 18 20:46 Dose:  20 mg








General: demented


HEENT: NC/AT, PERRLA, EOMI, anicteric sclerae, throat clear


Neck: Supple, No JVD, No thyromegaly, No LAD


Lungs: CTAB


Cardiovascular: RRR, Normal S1, Normal S2, without murmur


Abdomen: soft, non-tender, non-distended


Extremities: clear


Neurological: no change





- Procedures


Procedures: 


 Procedures











Procedure Code Date


 


GROUP PSYCHOTHERAPY 79212 16


 


GROUP PSYCHOTHERAPY GZHZZZZ 16


 


GROUP PSYCHOTHERAPY [id number] 16


 


GROUP PSYCHOTHERAPY GZHZZZZ 16


 


INDIVID PSYCHOTHERAP NEC 94.39 08


 


OTHER GROUP THERAPY 94.44 08/27/15


 


RECREATIONAL THERAPY 93.81 13














Internal Medicine Assmt/Plan





- Assessment


Assessment: 





1.HTN


2.HYPOTHROIDISM


3.DJD.


4.DEMENTIA..








- Plan


Plan: 





continue on current medication and diet.





Nutritional Asmnt/Malnutr-PDOC





- Dietary Evaluation


Malnutrition Findings (Please click <Entered> for more info): 








Nutritional Asmnt/Malnutrition                             Start:  18 13:

30


Text:                                                      Status: Complete    

  


Freq:                                                                          

  


 Document     18 13:30  GLORIA  (Rec: 18 13:36  [email or web address]  [place]-FNS1)


 Nutritional Asmnt/Malnutrition


     Patient General Information


      Nutritional Screening                      Low Risk


      Diagnosis                                  psychosis NOS


      Pertinent Medical Hx/Surgical Hx           HTN, hypothyroidism, dementia,


                                                 psychosis, DJD


      Subjective Information                     Per EMR PO intake %. Per


                                                 nurse notes, pt remains


                                                 withdrawn.


      Current Diet Order/ Nutrition Support      regular


      Pertinent Medications                      colace, synthorid


      Pertinent Labs                             3/16 Na 131, Alb 3.7


     Nutritional Hx/Data


      Height                                     1.8 m


      Height (Calculated Centimeters)            180.3


      Current Weight (lbs)                       74.389 kg


      Weight (Calculated Kilograms)              74.4


      Weight (Calculated Grams)                  14515.1


      Ideal Body Weight                          172


      Body Mass Index (BMI)                      22.8


      Weight Status                              Approriate


     GI Symptoms


      GI Symptoms                                None


      Last BM                                    3/21


      Difficult in:                              None


      Skin Integrity/Comment:                    intact


      Current %PO                                Good (%)


     Estimated Nutritional Goals


      BEE in Kcals:                              Using Current wt


      Calories/Kcals/Kg                          25-30


      Kcals Calculated                           1389-9693


      Protein:                                   Using Current wt


      Protein g/k


      Protein Calculated                         75


      Fluid: ml                                  1875-2250ml (1ml/kcal)


     Nutritional Problem


      No current Nutrition Prob


       Problem                                   N/A


     Intervention/Recommendation


      Comments                                   1. Continue with current diet


                                                 as ordered.


                                                 2. Monitor PO intake, wt, labs


                                                 and skin integrity


                                                 3. F/U as low risk in 7 days,


                                                 3/29


     Expected Outcomes/Goals


      Expected Outcomes/Goals                    1. PO intake to meet at least


                                                 75% of nutritional needs.


                                                 2. Wt stability, skin to


                                                 remain intact, labs to


                                                 approach WNL.

## 2018-03-28 RX ADMIN — LEVOTHYROXINE SODIUM SCH MG: 100 TABLET ORAL at 06:41

## 2018-03-28 RX ADMIN — Medication SCH TAB: at 08:15

## 2018-03-28 NOTE — PROGRESS NOTES
DATE:  03/28/2018



SUBJECTIVE:  Staff was spoken to.  The patient is interviewed.  Mood is

irritable and anxious.  Affect  is appropriate.  Not suicidal or homicidal

today.  Paranoia is noted, but the patient denies any command hallucinations. 

No side effects to the medications are noted.  The patient has been able to

verbalize the concerns rather than to act out.  No major behavior problems are

noted.  The patient is currently on 20 mg of the olanzapine and 500 mg twice a

day of the Depakote and has been able to tolerate.



ASSESSMENT:  The patient is stabilizing.



PLAN:  To discharge the patient today for followup on an outpatient basis.





DD: 03/28/2018 10:38

DT: 03/28/2018 22:10

[facility]# [id number]  [id number]

## 2018-03-28 NOTE — PROGRESS NOTES
DATE:  03/27/2018



PSYCHIATRIC PROGRESS NOTE



SUBJECTIVE:  Staff was spoken to.  The patient is interviewed.  Mood is noted to

be irritable.  Affect is constricted.  The patient is pacing on the unit. 

Coping skills at this time are noted to be poor.  The patient has paranoid

delusions, but denies any command hallucinations.



ASSESSMENT:  The patient is stabilizing.



PLAN:  To continue the current medications and discharge the patient when

placement is available.





DD: 03/27/2018 18:30

DT: 03/28/2018 02:48

[facility]# [id number]  [id number]

## 2018-03-28 NOTE — INTERNAL MEDICINE PROG NOTE
Internal Medicine Subjective





- Subjective


Service Date: 18


Patient seen and examined:: with staff


Patient is:: awake, in bed


Per staff patient has:: no adverse event





Internal Medicine Objective





- Results


Result Diagrams: 


 18 18:18 18:


Recent Labs: 


 Laboratory Last Values











WBC  6.9 Th/cmm (4.8-10.8)   18  18:    


 


RBC  4.82 Mil/cmm (3.80-5.80)   18  18:    


 


Hgb  14.2 gm/dL (12-16)   18  18:    


 


Hct  42.2 % (41.0-60)   18  18:    


 


MCV  87.6 fl (80-99)   18  18:    


 


MCH  29.5 pg (27.0-31.0)   18    


 


MCHC Differential  33.7 pg (28.0-36.0)   18    


 


RDW  13.3 % (11.5-20.0)   18  18:    


 


Plt Count  215 Th/cmm (150-400)   18  18:    


 


MPV  7.1 fl  18  18:    


 


Neutrophils %  57.5 % (40.0-80.0)   18  18:    


 


Lymphocytes %  31.7 % (20.0-50.0)   18  18:    


 


Monocytes %  9.3 % (2.0-10.0)   18:    


 


Eosinophils %  0.9 % (0.0-5.0)   18  18:    


 


Basophils %  0.6 % (0.0-2.0)   18  18:    


 


Sodium  131 mEq/L (136-145)  L  18  18:    


 


Potassium  4.0 mEq/L (3.5-5.1)   18  18:    


 


Chloride  102 mEq/L ()   18  18:    


 


Carbon Dioxide  28.0 mEq/L (21.0-31.0)   18  18:    


 


Anion Gap  5.0  (7.0-16.0)  L  03/16/18  18:28    


 


BUN  21 mg/dL (7-25)   18  18:28    


 


Creatinine  0.7 mg/dL (0.7-1.3)   18  18:28    


 


Est GFR ( Amer)  TNP   18  18:28    


 


Est GFR (Non-Af Amer)  TNP   18  18:28    


 


BUN/Creatinine Ratio  30.0   18  18:28    


 


Glucose  94 mg/dL ()   18  18:28    


 


Calcium  9.0 mg/dL (8.6-10.3)   18  18:28    


 


Total Bilirubin  0.5 mg/dL (0.3-1.0)   18  18:28    


 


AST  18 U/L (13-39)   18  18:28    


 


ALT  13 U/L (7-52)   18  18:28    


 


Alkaline Phosphatase  42 U/L ()   18  18:28    


 


Total Protein  6.5 gm/dL (6.0-8.3)   18  18:28    


 


Albumin  3.7 gm/dL (4.2-5.5)  L  18  18:28    


 


Globulin  2.8 gm/dL  18  18:28    


 


Albumin/Globulin Ratio  1.3  (1.0-1.8)   18  18:28    


 


TSH  0.56 uIU/ml (0.34-5.60)   18  18:28    


 


Valproic Acid  47.3 ug/mL (50.0-100.0)  L  18  19:34    














- Physical Exam


Vitals and I&O: 


 Vital Signs











Temp  97.8 F   18 14:11


 


Pulse  63   18 14:11


 


Resp  20   18 14:11


 


BP  155/68   18 14:11


 


Pulse Ox  98   18 14:11








 Intake & Output











 18





 06:59 18:59 06:59


 


Intake Total 360 1200 


 


Balance 360 1200 


 


Intake:   


 


  Oral 360 1200 


 


Other:   


 


  # Voids 2 3 


 


  # Bowel Movements 0  











Active Medications: 


Current Medications





Acetaminophen (Tylenol)  650 mg PO Q4HR PRN


   PRN Reason: Mild Pain / Temp above 100


   Stop: 05/15/18 22:05


Al Hydrox/Mg Hydrox/Simethicone (Maalox)  30 ml PO Q4HR PRN


   PRN Reason: GI DISTRESS


   Stop: 05/15/18 22:05


Benztropine Mesylate (Cogentin)  1 mg PO BID PRN


   PRN Reason: extrapyramidal side effects


   Stop: 18 08:50


   Last Admin: 18 10:40 Dose:  1 mg


Divalproex Sodium (Depakote Er)  500 mg PO Q12HR DANYA


   PRN Reason: Protocol


   Stop: 18 08:59


   Last Admin: 18 08:15 Dose:  500 mg


Docusate Sodium (Colace)  100 mg PO BID DANYA


   Stop: 18 08:59


   Last Admin: 18 16:18 Dose:  100 mg


Levothyroxine Sodium (Synthroid)  0.1 mg PO QDAC DANYA


   Stop: 18 07:29


   Last Admin: 18 06:41 Dose:  0.1 mg


Lorazepam (Ativan)  0.5 mg PO Q4HR PRN; Protocol


   PRN Reason: Agitation


   Stop: 18 08:59


   Last Admin: 18 09:21 Dose:  0.5 mg


Magnesium Hydroxide (Milk Of Magnesia)  30 ml PO HS PRN


   PRN Reason: Constipation


Magnesium Hydroxide (Milk Of Magnesia)  30 ml PO DAILY PRN


   PRN Reason: Constipation


   Stop: 05/15/18 23:17


Metoprolol Succinate (Toprol Xl)  50 mg PO DAILY DANYA


   Stop: 18 08:59


   Last Admin: 18 08:15 Dose:  50 mg


Olanzapine (Zyprexa)  20 mg PO HS DANYA


   PRN Reason: Protocol


   Stop: 18 20:59


   Last Admin: 18 21:14 Dose:  20 mg








General: demented


HEENT: NC/AT, PERRLA, EOMI, anicteric sclerae, throat clear


Neck: Supple, No JVD, No thyromegaly, No LAD


Lungs: CTAB


Cardiovascular: RRR, Normal S1, Normal S2, without murmur


Abdomen: soft, non-tender, non-distended


Extremities: clear


Neurological: no change





- Procedures


Procedures: 


 Procedures











Procedure Code Date


 


[facility] PSYCHOTHERAPY 85687 16


 


GROUP PSYCHOTHERAPY GZHZZZZ 16


 


GROUP PSYCHOTHERAPY [id number] 16


 


GROUP PSYCHOTHERAPY GZHZZZZ 16


 


INDIVID PSYCHOTHERAP NEC 94.39 08


 


OTHER GROUP THERAPY 94.44 08/27/15


 


RECREATIONAL THERAPY 93.81 13














Internal Medicine Assmt/Plan





- Assessment


Assessment: 





1.HTN


2.HYPOTHROIDISM


3.DJD.


4.DEMENTIA..








- Plan


Plan: 





continue on current medication and diet.





Nutritional Asmnt/Malnutr-PDOC





- Dietary Evaluation


Malnutrition Findings (Please click <Entered> for more info): 








Nutritional Asmnt/Malnutrition                             Start:  18 13:

30


Text:                                                      Status: Complete    

  


Freq:                                                                          

  


 Document     18 13:30  ADONAY  (Rec: 18 13:36  MAKENZIE  [place]-FNS1)


 Nutritional Asmnt/Malnutrition


     Patient General Information


      Nutritional Screening                      Low Risk


      Diagnosis                                  psychosis NOS


      Pertinent Medical Hx/Surgical Hx           HTN, hypothyroidism, dementia,


                                                 psychosis, DJD


      Subjective Information                     Per EMR PO intake %. Per


                                                 nurse notes, pt remains


                                                 withdrawn.


      Current Diet Order/ Nutrition Support      regular


      Pertinent Medications                      colace, synthorid


      Pertinent Labs                             3/16 Na 131, Alb 3.7


     Nutritional Hx/Data


      Height                                     1.8 m


      Height (Calculated Centimeters)            180.3


      Current Weight (lbs)                       74.389 kg


      Weight (Calculated Kilograms)              74.4


      Weight (Calculated Grams)                  79983.1


      Ideal Body Weight                          172


      Body Mass Index (BMI)                      22.8


      Weight Status                              Approriate


     GI Symptoms


      GI Symptoms                                None


      Last BM                                    3/21


      Difficult in:                              None


      Skin Integrity/Comment:                    intact


      Current %PO                                Good (%)


     Estimated Nutritional Goals


      BEE in Kcals:                              Using Current wt


      Calories/Kcals/Kg                          25-30


      Kcals Calculated                           4069-3301


      Protein:                                   Using Current wt


      Protein g/k


      Protein Calculated                         75


      Fluid: ml                                  1875-2250ml (1ml/kcal)


     Nutritional Problem


      No current Nutrition Prob


       Problem                                   N/A


     Intervention/Recommendation


      Comments                                   1. Continue with current diet


                                                 as ordered.


                                                 2. Monitor PO intake, wt, labs


                                                 and skin integrity


                                                 3. F/U as low risk in 7 days,


                                                 3/29


     Expected Outcomes/Goals


      Expected Outcomes/Goals                    1. PO intake to meet at least


                                                 75% of nutritional needs.


                                                 2. Wt stability, skin to


                                                 remain intact, labs to


                                                 approach WNL.

## 2018-03-29 RX ADMIN — LEVOTHYROXINE SODIUM SCH MG: 100 TABLET ORAL at 07:24

## 2018-03-29 RX ADMIN — Medication SCH TAB: at 08:17

## 2018-03-30 RX ADMIN — Medication SCH TAB: at 08:32

## 2018-03-30 RX ADMIN — LEVOTHYROXINE SODIUM SCH MG: 100 TABLET ORAL at 06:52

## 2018-03-30 NOTE — INTERNAL MEDICINE PROG NOTE
Internal Medicine Subjective





- Subjective


Service Date: 18


Patient seen and examined:: without staff


Patient is:: awake, in bed


Per staff patient has:: no adverse event





Internal Medicine Objective





- Results


Result Diagrams: 


 18 18:28





 18 18:


Recent Labs: 


 Laboratory Last Values











WBC  6.9 Th/cmm (4.8-10.8)   18  18:    


 


RBC  4.82 Mil/cmm (3.80-5.80)   18  18:    


 


Hgb  14.2 gm/dL (12-16)   18  18:    


 


Hct  42.2 % (41.0-60)   18  18:    


 


MCV  87.6 fl (80-99)   18  18:    


 


MCH  29.5 pg (27.0-31.0)   18  18    


 


MCHC Differential  33.7 pg (28.0-36.0)   18  18:    


 


RDW  13.3 % (11.5-20.0)   18  18:    


 


Plt Count  215 Th/cmm (150-400)   18  18:    


 


MPV  7.1 fl  18  18:    


 


Neutrophils %  57.5 % (40.0-80.0)   18  18:    


 


Lymphocytes %  31.7 % (20.0-50.0)   18  18:    


 


Monocytes %  9.3 % (2.0-10.0)   18  18:    


 


Eosinophils %  0.9 % (0.0-5.0)   18  18:    


 


Basophils %  0.6 % (0.0-2.0)   18  18:    


 


Sodium  131 mEq/L (136-145)  L  18  18:    


 


Potassium  4.0 mEq/L (3.5-5.1)   18  18:    


 


Chloride  102 mEq/L ()   18  18:    


 


Carbon Dioxide  28.0 mEq/L (21.0-31.0)   18  18:    


 


Anion Gap  5.0  (7.0-16.0)  L  03/16/18  18:28    


 


BUN  21 mg/dL (7-25)   18  18:28    


 


Creatinine  0.7 mg/dL (0.7-1.3)   18  18:28    


 


Est GFR ( Amer)  TNP   18  18:28    


 


Est GFR (Non-Af Amer)  TNP   18  18:28    


 


BUN/Creatinine Ratio  30.0   18  18:28    


 


Glucose  94 mg/dL ()   18  18:28    


 


Calcium  9.0 mg/dL (8.6-10.3)   18  18:28    


 


Total Bilirubin  0.5 mg/dL (0.3-1.0)   18  18:28    


 


AST  18 U/L (13-39)   18  18:28    


 


ALT  13 U/L (7-52)   18  18:28    


 


Alkaline Phosphatase  42 U/L ()   18  18:28    


 


Total Protein  6.5 gm/dL (6.0-8.3)   18  18:28    


 


Albumin  3.7 gm/dL (4.2-5.5)  L  18  18:28    


 


Globulin  2.8 gm/dL  18  18:28    


 


Albumin/Globulin Ratio  1.3  (1.0-1.8)   18  18:28    


 


TSH  0.56 uIU/ml (0.34-5.60)   18  18:28    


 


Valproic Acid  47.3 ug/mL (50.0-100.0)  L  18  19:34    














- Physical Exam


Vitals and I&O: 


 Vital Signs











Temp  98.4 F   18 21:24


 


Pulse  60   18 21:24


 


Resp  18   18 21:24


 


BP  141/64   18 21:24


 


Pulse Ox  94   18 21:24








 Intake & Output











 18





 06:59 18:59 06:59


 


Intake Total 120 1200 240


 


Balance 120 1200 240


 


Intake:   


 


  Oral 120 1200 240


 


Other:   


 


  # Voids 3 3 1


 


  # Bowel Movements  1 











Active Medications: 


Current Medications





Acetaminophen (Tylenol)  650 mg PO Q4HR PRN


   PRN Reason: Mild Pain / Temp above 100


   Stop: 05/15/18 22:05


Al Hydrox/Mg Hydrox/Simethicone (Maalox)  30 ml PO Q4HR PRN


   PRN Reason: GI DISTRESS


   Stop: 05/15/18 22:05


Benztropine Mesylate (Cogentin)  1 mg PO BID PRN


   PRN Reason: extrapyramidal side effects


   Stop: 18 08:50


   Last Admin: 18 10:40 Dose:  1 mg


Divalproex Sodium (Depakote Er)  500 mg PO Q12HR DANYA


   PRN Reason: Protocol


   Stop: 18 08:59


   Last Admin: 18 21:14 Dose:  500 mg


Docusate Sodium (Colace)  100 mg PO BID DANYA


   Stop: 18 08:59


   Last Admin: 18 17:52 Dose:  Not Given


Levothyroxine Sodium (Synthroid)  0.1 mg PO QDAC DANYA


   Stop: 18 07:29


   Last Admin: 18 07:24 Dose:  0.1 mg


Lorazepam (Ativan)  0.5 mg PO Q4HR PRN; Protocol


   PRN Reason: Agitation


   Stop: 18 08:59


   Last Admin: 18 21:14 Dose:  0.5 mg


Magnesium Hydroxide (Milk Of Magnesia)  30 ml PO HS PRN


   PRN Reason: Constipation


Magnesium Hydroxide (Milk Of Magnesia)  30 ml PO DAILY PRN


   PRN Reason: Constipation


   Stop: 05/15/18 23:17


Metoprolol Succinate (Toprol Xl)  50 mg PO DAILY DANYA


   Stop: 18 08:59


   Last Admin: 18 08:17 Dose:  50 mg


Olanzapine (Zyprexa)  20 mg PO HS DANYA


   PRN Reason: Protocol


   Stop: 18 20:59


   Last Admin: 18 21:13 Dose:  20 mg








General: demented


HEENT: NC/AT, PERRLA, EOMI, anicteric sclerae, throat clear


Neck: Supple, No JVD, No thyromegaly, No LAD


Lungs: CTAB


Cardiovascular: RRR, Normal S1, Normal S2, without murmur


Abdomen: soft, non-tender, non-distended


Extremities: clear


Neurological: no change





- Procedures


Procedures: 


 Procedures











Procedure Code Date


 


GROUP PSYCHOTHERAPY 81433 16


 


GROUP PSYCHOTHERAPY [id number] 16


 


GROUP PSYCHOTHERAPY [id number] 16


 


GROUP PSYCHOTHERAPY [id number] 16


 


INDIVID PSYCHOTHERAP NEC 94.39 08


 


OTHER GROUP THERAPY 94.44 08/27/15


 


RECREATIONAL THERAPY 93.81 13














Internal Medicine Assmt/Plan





- Assessment


Assessment: 





1.HTN


2.HYPOTHROIDISM


3.DJD.


4.DEMENTIA..








- Plan


Plan: 





continue on current medication and diet.





Nutritional Asmnt/Malnutr-PDOC





- Dietary Evaluation


Malnutrition Findings (Please click <Entered> for more info): 








Nutritional Asmnt/Malnutrition                             Start:  18 13:

30


Text:                                                      Status: Complete    

  


Freq:                                                                          

  


 Document     18 13:30  GLORIA  (Rec: 18 13:36  LCYOVANI  [id number])


 Nutritional Asmnt/Malnutrition


     Patient General Information


      Nutritional Screening                      Low Risk


      Diagnosis                                  psychosis NOS


      Pertinent Medical Hx/Surgical Hx           HTN, hypothyroidism, dementia,


                                                 psychosis, DJD


      Subjective Information                     Per EMR PO intake %. Per


                                                 nurse notes, pt remains


                                                 withdrawn.


      Current Diet Order/ Nutrition Support      regular


      Pertinent Medications                      colace, synthorid


      Pertinent Labs                             3/16 Na 131, Alb 3.7


     Nutritional Hx/Data


      Height                                     1.8 m


      Height (Calculated Centimeters)            180.3


      Current Weight (lbs)                       74.389 kg


      Weight (Calculated Kilograms)              74.4


      Weight (Calculated Grams)                  85454.1


      Ideal Body Weight                          172


      Body Mass Index (BMI)                      22.8


      Weight Status                              Approriate


     GI Symptoms


      GI Symptoms                                None


      Last BM                                    3/21


      Difficult in:                              None


      Skin Integrity/Comment:                    intact


      Current %PO                                Good (%)


     Estimated Nutritional Goals


      BEE in Kcals:                              Using Current wt


      Calories/Kcals/Kg                          25-30


      Kcals Calculated                           0145-0230


      Protein:                                   Using Current wt


      Protein g/k


      Protein Calculated                         75


      Fluid: ml                                  1875-2250ml (1ml/kcal)


     Nutritional Problem


      No current Nutrition Prob


       Problem                                   N/A


     Intervention/Recommendation


      Comments                                   1. Continue with current diet


                                                 as ordered.


                                                 2. Monitor PO intake, wt, labs


                                                 and skin integrity


                                                 3. F/U as low risk in 7 days,


                                                 3/29


     Expected Outcomes/Goals


      Expected Outcomes/Goals                    1. PO intake to meet at least


                                                 75% of nutritional needs.


                                                 2. Wt stability, skin to


                                                 remain intact, labs to


                                                 approach WNL.

## 2018-03-30 NOTE — PROGRESS NOTES
DATE:  03/29/2018



SUBJECTIVE:  Staff was spoken to.  The patient is interviewed.  Mood is noted to

be irritable.  Affect is constricted.  Insight and judgment at this time are

noted to be improving.  Impulse control seems to be fair.  No side effects to

the medications are noted.  The patient is pacing on the unit, but the patient

could be redirected.  The patient's sleep and appetite are also noted to be

improving at this time.



ASSESSMENT:  The patient is stabilizing.



PLAN:  To discharge the patient today for followup on outpatient basis.





DD: 03/29/2018 19:14

DT: 03/30/2018 05:35

JOB# [id number]  [id number]

## 2018-03-31 NOTE — PROGRESS NOTES
DATE:  03/30/2018



IDENTIFYING DATA:  The patient is a 72-year-old, resident of [facility].



JUSTIFICATION OF HOSPITALIZATION:  The patient is admitted on a voluntary basis

in view of his acute psychosis.



CHIEF COMPLAINT:  "They are bothering me a lot.



DIAGNOSES AT THE TIME OF ADMISSION:

AXIS IA:  Schizoaffective disorder.

AXIS IB:  Dementia and behavioral change, secondary to A.

AXIS II:  None.

AXIS III:  As per Dr. Albert.



HISTORY OF PRESENT ILLNESS:  Please refer to 03/17/18 dictation done by me. 

Physical examination was done by Dr. Albert and significant for hypertension,

hypothyroidism and hyponatremia and degenerative joint disease.  Lab studies

done at the hospitalization have been reviewed by Dr. Albert.



HOSPITAL COURSE:  Unresponsive treatment.  The patient has been closely

monitored on the inpatient unit, provided with supportive psychotherapy.  The

patient has been placed on the Depakote, which was given 500 mg q.12 hours and

Zyprexa was given 20 mg and these medications, the patient was observed and was

noted to be doing fairly well and the patient was finally discharged on

03/30/2018 with recommendation that he is going to be seeking treatment on

outpatient basis.



MENTAL STATUS EXAMINATION:  At the time of discharge, the patient's mood is

noted to be anxious.  Affect is appropriate.  Not suicidal or homicidal. 

Insight and judgment are noted to be fair.  Impulse control is also noted to be

fair.  The patient has been able to verbalize the concerns rather than to act

out and the patient has been able to participate in the groups and no major side

effects to the medications are noted at the time of the discharge.



DIAGNOSES AT THE TIME OF DISCHARGE:

AXIS I: Schizoaffective disorder.

AXIS II:  None.

AXIS III:  Hypertension, arthritis, hyperlipidemia.



AFTERCARE PLAN:  The patient is discharged to be followed up on an outpatient

basis.



PROGNOSIS:  At the time of the discharge noted to be fair with the treatment.





DD: 03/30/2018 19:01

DT: 03/31/2018 16:16

JOB# [id number]  [id number]

## 2018-08-28 ENCOUNTER — HOSPITAL ENCOUNTER (INPATIENT)
Dept: HOSPITAL 36 - ER | Age: 73
LOS: 15 days | Discharge: NURSING, REHAB OR LONG-TERM CARE | DRG: 885 | End: 2018-09-12
Attending: PSYCHIATRY & NEUROLOGY | Admitting: PSYCHIATRY & NEUROLOGY
Payer: MEDICARE

## 2018-08-28 VITALS — SYSTOLIC BLOOD PRESSURE: 155 MMHG | DIASTOLIC BLOOD PRESSURE: 72 MMHG

## 2018-08-28 DIAGNOSIS — Z82.49: ICD-10-CM

## 2018-08-28 DIAGNOSIS — F02.81: ICD-10-CM

## 2018-08-28 DIAGNOSIS — F25.9: Primary | ICD-10-CM

## 2018-08-28 DIAGNOSIS — G20: ICD-10-CM

## 2018-08-28 DIAGNOSIS — Z83.3: ICD-10-CM

## 2018-08-28 DIAGNOSIS — E87.1: ICD-10-CM

## 2018-08-28 DIAGNOSIS — K21.9: ICD-10-CM

## 2018-08-28 DIAGNOSIS — F29: ICD-10-CM

## 2018-08-28 DIAGNOSIS — I10: ICD-10-CM

## 2018-08-28 DIAGNOSIS — Z86.73: ICD-10-CM

## 2018-08-28 DIAGNOSIS — E03.9: ICD-10-CM

## 2018-08-28 LAB
ALBUMIN SERPL-MCNC: 3.8 GM/DL (ref 4.2–5.5)
ALBUMIN/GLOB SERPL: 1.7 {RATIO} (ref 1–1.8)
ALP SERPL-CCNC: 35 U/L (ref 34–104)
ALT SERPL-CCNC: 17 U/L (ref 7–52)
ANION GAP SERPL CALC-SCNC: 9.6 MMOL/L (ref 7–16)
APAP SERPL-MCNC: < 10 UG/ML (ref 10–30)
AST SERPL-CCNC: 31 U/L (ref 13–39)
BASOPHILS # BLD AUTO: 0.2 TH/CUMM (ref 0–0.2)
BASOPHILS NFR BLD AUTO: 1.9 % (ref 0–2)
BILIRUB SERPL-MCNC: 0.5 MG/DL (ref 0.3–1)
BUN SERPL-MCNC: 17 MG/DL (ref 7–25)
CALCIUM SERPL-MCNC: 8.8 MG/DL (ref 8.6–10.3)
CHLORIDE SERPL-SCNC: 99 MEQ/L (ref 98–107)
CHOLEST SERPL-MCNC: 158 MG/DL (ref ?–200)
CO2 SERPL-SCNC: 25.7 MEQ/L (ref 21–31)
CREAT SERPL-MCNC: 1 MG/DL (ref 0.7–1.3)
EOSINOPHIL # BLD AUTO: 0.1 TH/CMM (ref 0.1–0.4)
EOSINOPHIL NFR BLD AUTO: 0.7 % (ref 0–5)
ERYTHROCYTE [DISTWIDTH] IN BLOOD BY AUTOMATED COUNT: 13.5 % (ref 11.5–20)
GLOBULIN SER-MCNC: 2.3 GM/DL
GLUCOSE SERPL-MCNC: 135 MG/DL (ref 70–105)
HBA1C MFR BLD: 6.3 % (ref 4–6)
HCT VFR BLD CALC: 42.5 % (ref 41–60)
HDLC SERPL-MCNC: 61 MG/DL (ref 23–92)
HGB BLD-MCNC: 14 G/DL (ref 4–35)
HGB BLD-MCNC: 14.2 GM/DL (ref 12–16)
LYMPHOCYTE AB SER FC-ACNC: 1.2 TH/CMM (ref 1.5–3)
LYMPHOCYTES NFR BLD AUTO: 12.9 % (ref 20–50)
MCH RBC QN AUTO: 29.6 PG (ref 27–31)
MCHC RBC AUTO-ENTMCNC: 33.3 PG (ref 28–36)
MCV RBC AUTO: 89 FL (ref 80–99)
MONOCYTES # BLD AUTO: 0.5 TH/CMM (ref 0.3–1)
MONOCYTES NFR BLD AUTO: 5.3 % (ref 2–10)
NEUTROPHILS # BLD: 7.4 TH/CMM (ref 1.8–8)
NEUTROPHILS NFR BLD AUTO: 79.2 % (ref 40–80)
PLATELET # BLD: 245 TH/CMM (ref 150–400)
PMV BLD AUTO: 7.6 FL
POTASSIUM SERPL-SCNC: 4.3 MEQ/L (ref 3.5–5.1)
RBC # BLD AUTO: 4.78 MIL/CMM (ref 3.8–5.8)
SALICYLATES SERPL-MCNC: < 25 MG/L (ref 30–100)
SODIUM SERPL-SCNC: 130 MEQ/L (ref 136–145)
TRIGL SERPL-MCNC: 98 MG/DL (ref ?–150)
WBC # BLD AUTO: 9.4 TH/CMM (ref 4.8–10.8)

## 2018-08-28 NOTE — HISTORY & PHYSICAL
ADMIT DATE:  08/28/2018



HISTORY OF PRESENT ILLNESS:  The patient is a 73-year-old male with long history

of hypertension, hypothyroidism, dementia, admitted to [facility] under Dr. Land's service for evaluation and treatment.  The

patient is a poor historian secondary to his dementia and psychosis.



PAST MEDICAL HISTORY:  Significant for hypertension, hypothyroidism, dementia

and psychosis.



PAST SURGICAL HISTORY:  No recent surgery.



ALLERGIES:  None.



MEDICATIONS:  He is on magnesium hydroxide, acetaminophen, benztropine, valproic

acid, Colace, Synthroid, metoprolol, multivitamin, Zyprexa.



SOCIAL HISTORY:  No smoking, no alcohol, no drug.



FAMILY HISTORY:  Noncontributory.



REVIEW OF SYSTEMS:

RENAL SYSTEM:  No history of chronic renal disorder.

CARDIOVASCULAR SYSTEM:  No coronary artery disease.

ENDOCRINE SYSTEM:  He has history of hypothyroidism.

GASTROINTESTINAL SYSTEM:  No upper or lower gastrointestinal bleed.

NEUROLOGICAL SYSTEM:  Seizure disorder.

MUSCULOSKELETAL SYSTEM:  No muscular dystrophy.

HEMATOLOGIC SYSTEM:  No bleeding.

RESPIRATORY SYSTEM:  No asthma.

GENITOURINARY:  No dysuria or hematuria.



PHYSICAL EXAMINATION:

GENERAL:  He is awake, alert, not coherent.

VITAL SIGNS:  Temperature is 98.4, heart rate 62, blood pressure 161/85.

HEENT:  Normocephalic.  Pupils reactive to light and accommodation.  Sclerae are

clear.

NECK:  Supple.  Negative for lymphadenopathy, JVD or bruit.

CHEST:  Entry of air bilaterally normal.  No rhonchi or wheezing.

HEART:  S1, S2 normal.  No gallop rhythm.

ABDOMEN:  Soft, bowel sounds positive.

EXTREMITIES:  No edema.

BACK:  No vertebra.

SKIN:  Intact.

GENITAL AND RECTAL:  No complaint.

NEUROLOGICAL:  Awake, alert, not fully oriented.  No focal motor or sensory

deficit.  Cranial nerves 2-12 are intact.  Gait is stable.



ASSESSMENT:

1.  Hypertension.

2.  Hypothyroidism.

3.  Hyponatremia.

4.  Dementia.

5.  Psychosis.



PLAN:  The patient admitted to the hospital under Dr. Land's service. 

Medical problem addressed in this hospitalization psychosis and dementia. 

Medical problem to be addressed at discharge are hypothyroidism, hypertension. 

The patient is medically stable for activity.



Thank you, Dr. Land, for asking me to see your patient.





DD: 08/28/2018 20:52

DT: 08/28/2018 21:30

JOB# [id number]  [id number]

## 2018-08-28 NOTE — ED PHYSICIAN CHART
ED Chief Complaint/HPI





- Patient Information


Date Seen:: 08/28/18


Time Seen:: 13:10


Chief Complaint:: Agitation


History of Present Illness:: 





onset x 2 days of agitation and hostile/aggressive behavior; no report of trauma

, H/As, neck pain, C/P, SOB, Abd. pain, A/N/V/D/C, fever, chills, SIs, or 

urinary s/s


Allergies:: 


 Allergies











Allergy/AdvReac Type Severity Reaction Status Date / Time


 


No Known Allergies Allergy   Verified 03/16/18 18:11











Historian:: Patient, EMS


Review:: Nurse's Note Reviewed, Old Chart Reviewed, EMS run form Reviewed





ED Review of Systems





- Review of Systems


General/Constitutional: No fever, No chills, No weight loss, No weakness, No 

diaphoresis, No edema, No loss of appetite


Skin: No skin lesions, No rash, No bruising


Head: No headache, No light-headedness


Eyes: No loss of vision, No pain, No diplopia


ENT: No earache, No nasal drainage, No sore throat, No tinnitus


Neck: No neck pain, No swelling, No thyromegaly, No stiffness, No mass noted


Cardio Vascular: No chest pain, No palpitations, No PND, No orthopnea, No edema


Pulmonary: No SOB, No cough, No sputum, No wheezing


GI: No nausea, No vomiting, No diarrhea, No pain, No melena, No hematochezia, 

No constipation, No hematemesis


G/U: No dysuria, No frequency, No hematuria, No nacturia


Musculoskeletal: No bone or joint pain, No back pain, No muscle pain


Endocrine: No polyuria, No polydipsia


Psychiatric: Prior psych history, Depression, Anxiety, No suicidal ideation, No 

homicidal ideation, No auditory hallucination, No visual hallucination


Hematopoietic: No bruising, No lymphadenopathy


Allergic/Immuno: No urticaria, No angioedema


Neurological: No syncope, No focal symptoms, No weakness, No paresthesia, No 

headache, No seizure, No dizziness, Confusion, No vertigo





ED Past Medical History





- Past Medical History


Obtainable: Yes


Past Medical History: HTN, CVA/TIA, PUD/GERD, Thyroid disorder, Dementia, Other 

(Parkinson's Disease)


Family History: Diabetes Melitus, HTN


Social History: Non Smoker, No Alcohol, No Drug Use, Single, Care Facility


Surgical History: None


Psychiatricy History: Depression, Schizophrenia, Bipolar, Dementia


Medication: Reviewed





Family Medical History





- Family Member


  ** Mother


History Unknown: Yes


Ethnicity: Unknown


Living Status: Unknown


Hx Family Cancer: No


Hx Family Coronary Artery Disease: No


Hx Family Congestive Heart Failure: No


Hx Family Hypertension: No


Hx Family Stroke: No


Hx Family Diabetes: No


Hx Family Seizures: No


Hx Family Dementia: No


Hx Family AIDS: No


Hx Family HIV: No


Hx Family COPD: No


Hx Family Hepatitis: No


Hx Family Psychiatric Problems: No


Hx Family Tuberculosis: No





ED Physical Exam





- Physical Examination


General/Constitutional: Awake, Well-developed, well-nourished, Alert, No 

distress, GCS 15, Non-toxic appearing, Ambulatory


Head: Atraumatic


Eyes: Lids, conjuctiva normal, PERRL, EOMI


Skin: Nl inspection, No rash, No skin lesions, No ecchymosis, Well hydrated, No 

lymphadenopathy


ENMT: External ears, nose nl, TM canals nl, Nasal exam nl, Lips, teeth, gums nl

, Oropharynx nl, Tonsils nl


Neck: Nontender, Full ROM w/o pain, No JVD, No nuchal rigidity, No bruit, No 

mass, No stridor


Respiratory: Nl effort/Exclusion, Clear to Auscultation, No Wheeze/Rhonchi/Rales


Cardio Vascular: RRR, No murmur, gallop, rubs, NL S1 S2, Carotid/Femoral/Distal 

pulses equal bilaterally


GI: No tenderness/rebounding/guarding, No organomegaly, No hernia, Normal BS's, 

Nondistended, No mass/bruits, No McBurney tenderness


: No CVA tenderness


Extremities: No tenderness or effusion, Full ROM, normal strength in all 

extremities, No edema, Normal digits & nails


Neuro/Psych: Alert/oriented, DTR's symmetric, Normal sensory exam, Normal motor 

strength, Judgement/insight normal, Mood normal, Normal gait, No focal deficits


Other Neuro/Psych comments:: 





+ Psychomotor Agitation; no SIs; Mood/Affect: Labile


Misc: Normal back, No paraspinal tenderness





ED Labs/Radiology/EKG Results





- Lab Results


Comments:: 





Reviewed





- EKG Interpretations


EKG Time:: 13:34


Rate & Rhythm: 62; NSR


Comments:: 





non-specific st-t changes





ED Septic Shock





- .


Is Septic Shock (SBP<90, OR Lactate>4 mmol\L) present?: No





ED Reassessment (Disposition)





- Reassessment


Reassessment Condition:: Improved





- Diagnosis


Diagnosis:: 





Dx: Agitation; Psychosis; Dementia; Medical Clearance; Hyponatremia; Schizo-

Affective Disorder; Bipolar Disorder





- Aftercare/Follow up Instructions


Aftercare/Follow-Up Instructions:: Counseled pt regarding lab results/diagnosis 

& need follow up, Counseled pt & family regarding lab results/diagnosis & need 

follow up





- Patient Disposition


Discharge/Transfer:: Acute Care w/in this hosp


Admitted to:: [facility]


Condition at Disposition:: Stable, Improved

## 2018-08-29 RX ADMIN — LEVOTHYROXINE SODIUM SCH MG: 75 TABLET ORAL at 06:51

## 2018-08-29 RX ADMIN — Medication SCH TAB: at 09:38

## 2018-08-29 NOTE — INTERNAL MEDICINE PROG NOTE
Internal Medicine Subjective





- Subjective


Service Date: 08/29/18


Patient seen and examined:: with staff


Patient is:: awake, verbal, talking, confused


Per staff patient has:: no adverse event





Internal Medicine Objective





- Results


Result Diagrams: 


 08/28/18 13:33





 08/28/18 13:33


Recent Labs: 


 Laboratory Last Values











WBC  9.4 Th/cmm (4.8-10.8)   08/28/18  13:33    


 


RBC  4.78 Mil/cmm (3.80-5.80)   08/28/18  13:33    


 


Hgb  14.2 gm/dL (12-16)   08/28/18  13:33    


 


Hct  42.5 % (41.0-60)   08/28/18  13:33    


 


MCV  89.0 fl (80-99)   08/28/18  13:33    


 


MCH  29.6 pg (27.0-31.0)   08/28/18  13:33    


 


MCHC Differential  33.3 pg (28.0-36.0)   08/28/18  13:33    


 


RDW  13.5 % (11.5-20.0)   08/28/18  13:33    


 


Plt Count  245 Th/cmm (150-400)   08/28/18  13:33    


 


MPV  7.6 fl  08/28/18  13:33    


 


Neutrophils %  79.2 % (40.0-80.0)   08/28/18  13:33    


 


Lymphocytes %  12.9 % (20.0-50.0)  L  08/28/18  13:33    


 


Monocytes %  5.3 % (2.0-10.0)   08/28/18  13:33    


 


Eosinophils %  0.7 % (0.0-5.0)   08/28/18  13:33    


 


Basophils %  1.9 % (0.0-2.0)   08/28/18  13:33    


 


Sodium  130 mEq/L (136-145)  L  08/28/18  13:33    


 


Potassium  4.3 mEq/L (3.5-5.1)   08/28/18  13:33    


 


Chloride  99 mEq/L ()   08/28/18  13:33    


 


Carbon Dioxide  25.7 mEq/L (21.0-31.0)   08/28/18  13:33    


 


Anion Gap  9.6  (7.0-16.0)   08/28/18  13:33    


 


BUN  17 mg/dL (7-25)   08/28/18  13:33    


 


Creatinine  1.0 mg/dL (0.7-1.3)   08/28/18  13:33    


 


Est GFR ( Amer)  TNP   08/28/18  13:33    


 


Est GFR (Non-Af Amer)  TNP   08/28/18  13:33    


 


BUN/Creatinine Ratio  17.0   08/28/18  13:33    


 


Glucose  135 mg/dL ()  H  08/28/18  13:33    


 


Hemoglobin A1c %  6.3 % (4.0-6.0)  H  08/28/18  13:33    


 


Calcium  8.8 mg/dL (8.6-10.3)   08/28/18  13:33    


 


Total Bilirubin  0.5 mg/dL (0.3-1.0)   08/28/18  13:33    


 


AST  31 U/L (13-39)   08/28/18  13:33    


 


ALT  17 U/L (7-52)   08/28/18  13:33    


 


Alkaline Phosphatase  35 U/L ()   08/28/18  13:33    


 


Troponin I  0.01 ng/mL (0.01-0.05)   08/28/18  13:33    


 


Total Protein  6.1 gm/dL (6.0-8.3)   08/28/18  13:33    


 


Albumin  3.8 gm/dL (4.2-5.5)  L  08/28/18  13:33    


 


Globulin  2.3 gm/dL  08/28/18  13:33    


 


Albumin/Globulin Ratio  1.7  (1.0-1.8)   08/28/18  13:33    


 


Triglycerides  98 mg/dL (<150)   08/28/18  13:33    


 


Cholesterol  158 mg/dL (<200)   08/28/18  13:33    


 


LDL Cholesterol Direct  79 mg/dL ()   08/28/18  13:33    


 


HDL Cholesterol  61 mg/dL (23-92)   08/28/18  13:33    


 


TSH  0.87 uIU/ml (0.34-5.60)   08/28/18  13:33    


 


Salicylates  < 25.0 mg/L (30.0-100.0)  L  08/28/18  13:33    


 


Acetaminophen  < 10.0 ug/mL (10.0-30.0)  L  08/28/18  13:33    


 


Ethyl Alcohol  < 10 mg/dL (0-10)   08/28/18  13:33    














- Physical Exam


Vitals and I&O: 


 Vital Signs











Temp  98.2 F   08/29/18 20:00


 


Pulse  60   08/29/18 20:00


 


Resp  20   08/29/18 20:00


 


BP  134/69   08/29/18 20:00


 


Pulse Ox  97   08/29/18 20:00








 Intake & Output











 08/29/18 08/29/18 08/30/18





 06:59 18:59 06:59


 


Intake Total 360 1600 


 


Balance 360 1600 


 


Weight (lbs) 75.75 kg  


 


Intake:   


 


  Oral 360 1600 


 


Other:   


 


  # Voids 1 4 


 


  # Bowel Movements 0 0 


 


  Weight Source Bedscale  











Active Medications: 


Current Medications





Acetaminophen (Tylenol)  650 mg PO Q4HR PRN


   PRN Reason: Mild Pain / Temp above 100


   Stop: 10/27/18 16:04


Benztropine Mesylate (Cogentin)  1 mg PO BID PRN


   PRN Reason: extrapyramidal side effects


   Stop: 10/27/18 16:04


Divalproex Sodium (Depakote Er)  500 mg PO Q12HR DANYA; Protocol


   Stop: 10/27/18 22:59


   Last Admin: 08/29/18 20:50 Dose:  500 mg


Docusate Sodium (Colace)  100 mg PO BID DANYA


   Stop: 10/27/18 16:59


   Last Admin: 08/29/18 17:00 Dose:  100 mg


Levothyroxine Sodium (Synthroid)  0.075 mg PO QDAC DANYA


   Stop: 10/28/18 07:29


   Last Admin: 08/29/18 06:51 Dose:  0.075 mg


Lorazepam (Ativan)  0.5 mg PO Q6HR PRN; Protocol


   PRN Reason: Anxiety


   Stop: 10/27/18 20:03


   Last Admin: 08/29/18 06:51 Dose:  0.5 mg


Metoprolol Succinate (Toprol Xl)  50 mg PO DAILY DANYA


   Stop: 10/28/18 08:59


   Last Admin: 08/29/18 09:37 Dose:  50 mg


Olanzapine (Zyprexa)  20 mg PO HS DANYA; Protocol


   Stop: 10/27/18 22:59


   Last Admin: 08/29/18 20:51 Dose:  20 mg








General: demented


HEENT: NC/AT, PERRLA, EOMI, anicteric sclerae, throat clear


Neck: Supple, No JVD, No thyromegaly, +2 carotid pulse wo bruit


Lungs: CTAB


Cardiovascular: RRR, Normal S1, Normal S2, without murmur


Abdomen: soft, non-tender, non-distended


Extremities: clear


Neurological: no change





- Procedures


Procedures: 


 Procedures











Procedure Code Date


 


GROUP PSYCHOTHERAPY 23301 05/02/16


 


GROUP PSYCHOTHERAPY [id number] 05/02/16


 


GROUP PSYCHOTHERAPY [id number] 01/18/16


 


GROUP PSYCHOTHERAPY [id number] 01/18/16


 


[facility] PSYCHOTHERAP NEC 94.39 05/20/08


 


OTHER GROUP THERAPY 94.44 08/27/15


 


RECREATIONAL THERAPY 93.81 01/17/13














Internal Medicine Assmt/Plan





- Assessment


Assessment: 





1.HTN.


2.HYPOTHYROIISIM.


3.DEMENTIA.


4.PSYCHOSIS





- Plan


Plan: 





CONTINUE ON CURRENT MEDICATION AND DIET.

## 2018-08-30 RX ADMIN — LEVOTHYROXINE SODIUM SCH MG: 75 TABLET ORAL at 06:44

## 2018-08-30 RX ADMIN — Medication SCH TAB: at 08:45

## 2018-08-30 NOTE — INTERNAL MEDICINE PROG NOTE
Internal Medicine Subjective





- Subjective


Service Date: 08/30/18


Patient seen and examined:: with staff


Patient is:: awake, verbal, talking, confused


Per staff patient has:: no adverse event





Internal Medicine Objective





- Results


Result Diagrams: 


 08/28/18 13:33





 08/28/18 13:33


Recent Labs: 


 Laboratory Last Values











WBC  9.4 Th/cmm (4.8-10.8)   08/28/18  13:33    


 


RBC  4.78 Mil/cmm (3.80-5.80)   08/28/18  13:33    


 


Hgb  14.2 gm/dL (12-16)   08/28/18  13:33    


 


Hct  42.5 % (41.0-60)   08/28/18  13:33    


 


MCV  89.0 fl (80-99)   08/28/18  13:33    


 


MCH  29.6 pg (27.0-31.0)   08/28/18  13:33    


 


MCHC Differential  33.3 pg (28.0-36.0)   08/28/18  13:33    


 


RDW  13.5 % (11.5-20.0)   08/28/18  13:33    


 


Plt Count  245 Th/cmm (150-400)   08/28/18  13:33    


 


MPV  7.6 fl  08/28/18  13:33    


 


Neutrophils %  79.2 % (40.0-80.0)   08/28/18  13:33    


 


Lymphocytes %  12.9 % (20.0-50.0)  L  08/28/18  13:33    


 


Monocytes %  5.3 % (2.0-10.0)   08/28/18  13:33    


 


Eosinophils %  0.7 % (0.0-5.0)   08/28/18  13:33    


 


Basophils %  1.9 % (0.0-2.0)   08/28/18  13:33    


 


Sodium  130 mEq/L (136-145)  L  08/28/18  13:33    


 


Potassium  4.3 mEq/L (3.5-5.1)   08/28/18  13:33    


 


Chloride  99 mEq/L ()   08/28/18  13:33    


 


Carbon Dioxide  25.7 mEq/L (21.0-31.0)   08/28/18  13:33    


 


Anion Gap  9.6  (7.0-16.0)   08/28/18  13:33    


 


BUN  17 mg/dL (7-25)   08/28/18  13:33    


 


Creatinine  1.0 mg/dL (0.7-1.3)   08/28/18  13:33    


 


Est GFR ( Amer)  TNP   08/28/18  13:33    


 


Est GFR (Non-Af Amer)  TNP   08/28/18  13:33    


 


BUN/Creatinine Ratio  17.0   08/28/18  13:33    


 


Glucose  135 mg/dL ()  H  08/28/18  13:33    


 


Hemoglobin A1c %  6.3 % (4.0-6.0)  H  08/28/18  13:33    


 


Calcium  8.8 mg/dL (8.6-10.3)   08/28/18  13:33    


 


Total Bilirubin  0.5 mg/dL (0.3-1.0)   08/28/18  13:33    


 


AST  31 U/L (13-39)   08/28/18  13:33    


 


ALT  17 U/L (7-52)   08/28/18  13:33    


 


Alkaline Phosphatase  35 U/L ()   08/28/18  13:33    


 


Troponin I  0.01 ng/mL (0.01-0.05)   08/28/18  13:33    


 


Total Protein  6.1 gm/dL (6.0-8.3)   08/28/18  13:33    


 


Albumin  3.8 gm/dL (4.2-5.5)  L  08/28/18  13:33    


 


Globulin  2.3 gm/dL  08/28/18  13:33    


 


Albumin/Globulin Ratio  1.7  (1.0-1.8)   08/28/18  13:33    


 


Triglycerides  98 mg/dL (<150)   08/28/18  13:33    


 


Cholesterol  158 mg/dL (<200)   08/28/18  13:33    


 


LDL Cholesterol Direct  79 mg/dL ()   08/28/18  13:33    


 


HDL Cholesterol  61 mg/dL (23-92)   08/28/18  13:33    


 


TSH  0.87 uIU/ml (0.34-5.60)   08/28/18  13:33    


 


Salicylates  < 25.0 mg/L (30.0-100.0)  L  08/28/18  13:33    


 


Acetaminophen  < 10.0 ug/mL (10.0-30.0)  L  08/28/18  13:33    


 


Ethyl Alcohol  < 10 mg/dL (0-10)   08/28/18  13:33    


 


RPR  NONREACTIVE  (NONREACTIVE)   08/28/18  13:33    














- Physical Exam


Vitals and I&O: 


 Vital Signs











Temp  98.6 F   08/30/18 14:00


 


Pulse  66   08/30/18 14:00


 


Resp  20   08/30/18 14:00


 


BP  154/63   08/30/18 14:00


 


Pulse Ox  95   08/30/18 14:00








 Intake & Output











 08/29/18 08/30/18 08/30/18





 18:59 06:59 18:59


 


Intake Total [phone]


 


Balance 4494 337 4880


 


Intake:   


 


  Oral [phone]


 


Other:   


 


  # Voids 4 3 4


 


  # Bowel Movements 0  1











Active Medications: 


Current Medications





Acetaminophen (Tylenol)  650 mg PO Q4HR PRN


   PRN Reason: Mild Pain / Temp above 100


   Stop: 10/27/18 16:04


Benztropine Mesylate (Cogentin)  1 mg PO BID PRN


   PRN Reason: extrapyramidal side effects


   Stop: 10/27/18 16:04


Divalproex Sodium (Depakote Er)  500 mg PO Q12HR DANYA; Protocol


   Stop: 10/27/18 22:59


   Last Admin: 08/30/18 08:45 Dose:  500 mg


Docusate Sodium (Colace)  100 mg PO BID DANYA


   Stop: 10/27/18 16:59


   Last Admin: 08/30/18 16:38 Dose:  100 mg


Levothyroxine Sodium (Synthroid)  0.075 mg PO QDAC DANYA


   Stop: 10/28/18 07:29


   Last Admin: 08/30/18 06:44 Dose:  0.075 mg


Lorazepam (Ativan)  0.5 mg PO Q6HR PRN; Protocol


   PRN Reason: Anxiety


   Stop: 10/27/18 20:03


   Last Admin: 08/29/18 06:51 Dose:  0.5 mg


Metoprolol Succinate (Toprol Xl)  50 mg PO DAILY DANYA


   Stop: 10/28/18 08:59


   Last Admin: 08/30/18 08:45 Dose:  50 mg


Olanzapine (Zyprexa)  20 mg PO HS DANYA; Protocol


   Stop: 10/27/18 22:59


   Last Admin: 08/29/18 20:51 Dose:  20 mg








General: demented


HEENT: NC/AT, PERRLA, EOMI, anicteric sclerae, throat clear


Neck: Supple, No JVD, No thyromegaly, +2 carotid pulse wo bruit


Lungs: CTAB


Cardiovascular: RRR, Normal S1, Normal S2, without murmur


Abdomen: soft, non-tender, non-distended


Extremities: clear


Neurological: no change





- Procedures


Procedures: 


 Procedures











Procedure Code Date


 


GROUP PSYCHOTHERAPY 89438 05/02/16


 


GROUP PSYCHOTHERAPY [id number] 05/02/16


 


GROUP PSYCHOTHERAPY [id number] 01/18/16


 


GROUP PSYCHOTHERAPY GZHZZZZ 01/18/16


 


INDIVID PSYCHOTHERAP NEC 94.39 05/20/08


 


OTHER GROUP THERAPY 94.44 08/27/15


 


RECREATIONAL THERAPY 93.81 01/17/13














Internal Medicine Assmt/Plan





- Assessment


Assessment: 





1.HTN.


2.HYPOTHYROIISIM.


3.DEMENTIA.


4.PSYCHOSIS





- Plan


Plan: 





CONTINUE ON CURRENT MEDICATION AND DIET.

## 2018-08-30 NOTE — PSYCHIATRIC EVALUATION
DATE OF SERVICE:  08/29/2018



IDENTIFYING DATA:  The patient is a 73-year-old  male resident of a

skilled nursing facility.  Information obtained by directly interviewing the

patient as well as reviewing the admission papers and they are reliable.



JUSTIFICATION OF HOSPITALIZATION:  The patient is admitted here on a voluntary

basis in view of his acute agitation and striking out at the staff members.



CHIEF COMPLAINT:  "I am okay, poor."  The patient has been walking away from

me.



DIAGNOSES AT THE TIME OF ADMISSION:

AXIS I:  Schizoaffective disorder.

IB:  Dementia and behavioral changes.

AXIS II:  None.

AXIS III:  As per Dr. Albert.



HISTORY OF PRESENT ILLNESS:  This patient has been hospitalized on multiple

occasions, the last one was in 03/2018.  The patient has been followed up by 

____ on an outpatient basis.  The patient has been on Zyprexa and has been able

to tolerate.  The patient has been reluctant to comply with the medications. 

The patient's sleep and appetite prior to the hospitalization are reported to be

poor.  The patient has been going off on the staff and has been trying to run

away from the facility.



PAST PSYCHIATRIC HISTORY:  Please refer to the above.



MEDICAL HISTORY AND PHYSICAL EXAMINATION:  Requested to be done by Dr. Albert.



LEGAL PROBLEMS:  None at this time.



SOCIAL HISTORY:  The patient is a resident of the [facility].



MENTAL STATUS EXAMINATION:  The patient is a 73-year-old, looking his stated

age, superficially cooperative.  Eye contact is poor.  The patient is getting

easily irritable and angry and snapping out.  The patient has been screaming and

yelling and wanted to leave.  The patient is pacing most of the time on the

unit, not able to sit still.  The patient is paranoid and is responding to

internal stimuli.  The patient is alert and awake.  The patient is also fully

aware that he is in the hospital, but attention span and concentration are noted

to be poor.  The patient is not able to provide much of information in the ____.

 Cognitive testing is noted to be very difficult with this patient.  The patient

has been having acute mood swings.



DIAGNOSTIC IMPRESSION:

AXIS I:  Schizoaffective disorder.

AXIS II:  None.

AXIS III:  As per Dr. Albert.



IMMEDIATE TREATMENT PLAN:  The patient is going to be continued on the Depakote

500 mg twice a day and olanzapine 20 mg at bedtime, Cogentin is going to be

given 1 mg on b.i.d. p.r.n. basis and the patient is going to be followed up

with the supportive therapy.



ESTIMATED LENGTH OF STAY:  5-7 days.



DISCHARGE CRITERIA:  The patient will be no longer a threat to self or others

and be able to cope up with the stress.





DD: 08/29/2018 19:07

DT: 08/30/2018 01:14

JOB# [id number]  [id number]

## 2018-08-30 NOTE — PROGRESS NOTES
DATE:  08/30/2018



SUBJECTIVE:  Staff was spoken to.  The patient is interviewed.  Mood is noted to

be irritable.  Affect is constricted.  Continues to be paranoid and pacing most

of the time on the unit.  Insight and judgment at this time are noted to be

still impaired.  Impulse control is noted to be limited.



ASSESSMENT:  The patient is still psychotic and aggressive.



PLAN:  To continue the patient with the supportive therapy and followup.





DD: 08/30/2018 11:02

DT: 08/30/2018 12:07

[facility]# [id number]  [id number]

## 2018-08-31 RX ADMIN — LEVOTHYROXINE SODIUM SCH MG: 75 TABLET ORAL at 06:30

## 2018-08-31 RX ADMIN — Medication SCH TAB: at 08:48

## 2018-08-31 NOTE — INTERNAL MEDICINE PROG NOTE
Internal Medicine Subjective





- Subjective


Service Date: 08/31/18


Patient seen and examined:: with staff


Patient is:: awake, verbal, talking, confused


Per staff patient has:: no adverse event





Internal Medicine Objective





- Results


Result Diagrams: 


 08/28/18 13:33





 08/28/18 13:33


Recent Labs: 


 Laboratory Last Values











WBC  9.4 Th/cmm (4.8-10.8)   08/28/18  13:33    


 


RBC  4.78 Mil/cmm (3.80-5.80)   08/28/18  13:33    


 


Hgb  14.2 gm/dL (12-16)   08/28/18  13:33    


 


Hct  42.5 % (41.0-60)   08/28/18  13:33    


 


MCV  89.0 fl (80-99)   08/28/18  13:33    


 


MCH  29.6 pg (27.0-31.0)   08/28/18  13:33    


 


MCHC Differential  33.3 pg (28.0-36.0)   08/28/18  13:33    


 


RDW  13.5 % (11.5-20.0)   08/28/18  13:33    


 


Plt Count  245 Th/cmm (150-400)   08/28/18  13:33    


 


MPV  7.6 fl  08/28/18  13:33    


 


Neutrophils %  79.2 % (40.0-80.0)   08/28/18  13:33    


 


Lymphocytes %  12.9 % (20.0-50.0)  L  08/28/18  13:33    


 


Monocytes %  5.3 % (2.0-10.0)   08/28/18  13:33    


 


Eosinophils %  0.7 % (0.0-5.0)   08/28/18  13:33    


 


Basophils %  1.9 % (0.0-2.0)   08/28/18  13:33    


 


Sodium  130 mEq/L (136-145)  L  08/28/18  13:33    


 


Potassium  4.3 mEq/L (3.5-5.1)   08/28/18  13:33    


 


Chloride  99 mEq/L ()   08/28/18  13:33    


 


Carbon Dioxide  25.7 mEq/L (21.0-31.0)   08/28/18  13:33    


 


Anion Gap  9.6  (7.0-16.0)   08/28/18  13:33    


 


BUN  17 mg/dL (7-25)   08/28/18  13:33    


 


Creatinine  1.0 mg/dL (0.7-1.3)   08/28/18  13:33    


 


Est GFR ( Amer)  TNP   08/28/18  13:33    


 


Est GFR (Non-Af Amer)  TNP   08/28/18  13:33    


 


BUN/Creatinine Ratio  17.0   08/28/18  13:33    


 


Glucose  135 mg/dL ()  H  08/28/18  13:33    


 


Hemoglobin A1c %  6.3 % (4.0-6.0)  H  08/28/18  13:33    


 


Calcium  8.8 mg/dL (8.6-10.3)   08/28/18  13:33    


 


Total Bilirubin  0.5 mg/dL (0.3-1.0)   08/28/18  13:33    


 


AST  31 U/L (13-39)   08/28/18  13:33    


 


ALT  17 U/L (7-52)   08/28/18  13:33    


 


Alkaline Phosphatase  35 U/L ()   08/28/18  13:33    


 


Troponin I  0.01 ng/mL (0.01-0.05)   08/28/18  13:33    


 


Total Protein  6.1 gm/dL (6.0-8.3)   08/28/18  13:33    


 


Albumin  3.8 gm/dL (4.2-5.5)  L  08/28/18  13:33    


 


Globulin  2.3 gm/dL  08/28/18  13:33    


 


Albumin/Globulin Ratio  1.7  (1.0-1.8)   08/28/18  13:33    


 


Triglycerides  98 mg/dL (<150)   08/28/18  13:33    


 


Cholesterol  158 mg/dL (<200)   08/28/18  13:33    


 


LDL Cholesterol Direct  79 mg/dL ()   08/28/18  13:33    


 


HDL Cholesterol  61 mg/dL (23-92)   08/28/18  13:33    


 


TSH  0.87 uIU/ml (0.34-5.60)   08/28/18  13:33    


 


Salicylates  < 25.0 mg/L (30.0-100.0)  L  08/28/18  13:33    


 


Acetaminophen  < 10.0 ug/mL (10.0-30.0)  L  08/28/18  13:33    


 


Ethyl Alcohol  < 10 mg/dL (0-10)   08/28/18  13:33    


 


RPR  NONREACTIVE  (NONREACTIVE)   08/28/18  13:33    














- Physical Exam


Vitals and I&O: 


 Vital Signs











Temp  97.7 F   08/31/18 14:00


 


Pulse  61   08/31/18 14:00


 


Resp  20   08/31/18 14:00


 


BP  121/67   08/31/18 14:00


 


Pulse Ox  96   08/31/18 14:00








 Intake & Output











 08/31/18 08/31/18 09/01/18





 06:59 18:59 06:59


 


Intake Total 120 1500 


 


Balance 120 1500 


 


Intake:   


 


  Oral 120 1500 


 


Other:   


 


  # Voids 3 4 


 


  # Bowel Movements  0 











Active Medications: 


Current Medications





Acetaminophen (Tylenol)  650 mg PO Q4HR PRN


   PRN Reason: Mild Pain / Temp above 100


   Stop: 10/27/18 16:04


Benztropine Mesylate (Cogentin)  1 mg PO BID PRN


   PRN Reason: extrapyramidal side effects


   Stop: 10/27/18 16:04


Divalproex Sodium (Depakote Er)  500 mg PO Q12HR DANYA; Protocol


   Stop: 10/27/18 22:59


   Last Admin: 08/31/18 08:48 Dose:  500 mg


Docusate Sodium (Colace)  100 mg PO BID DANYA


   Stop: 10/27/18 16:59


   Last Admin: 08/31/18 16:20 Dose:  100 mg


Levothyroxine Sodium (Synthroid)  0.075 mg PO QDAC DANYA


   Stop: 10/28/18 07:29


   Last Admin: 08/31/18 06:30 Dose:  0.075 mg


Lorazepam (Ativan)  0.5 mg PO Q6HR PRN; Protocol


   PRN Reason: Anxiety


   Stop: 10/27/18 20:03


   Last Admin: 08/30/18 21:20 Dose:  0.5 mg


Metoprolol Succinate (Toprol Xl)  50 mg PO DAILY DANYA


   Stop: 10/28/18 08:59


   Last Admin: 08/31/18 08:46 Dose:  50 mg


Olanzapine (Zyprexa)  20 mg PO HS DANYA; Protocol


   Stop: 10/27/18 22:59


   Last Admin: 08/30/18 21:20 Dose:  20 mg








General: demented


HEENT: NC/AT, PERRLA, EOMI, anicteric sclerae, throat clear


Neck: Supple, No JVD, No thyromegaly, +2 carotid pulse wo bruit


Lungs: CTAB


Cardiovascular: RRR, Normal S1, Normal S2, without murmur


Abdomen: soft, non-tender, non-distended


Extremities: clear


Neurological: no change





- Procedures


Procedures: 


 Procedures











Procedure Code Date


 


GROUP PSYCHOTHERAPY 52241 05/02/16


 


GROUP PSYCHOTHERAPY GZHZZZZ 05/02/16


 


GROUP PSYCHOTHERAPY [id number] 01/18/16


 


GROUP PSYCHOTHERAPY GZHZZZZ 01/18/16


 


INDIVID PSYCHOTHERAP NEC 94.39 05/20/08


 


OTHER GROUP THERAPY 94.44 08/27/15


 


RECREATIONAL THERAPY 93.81 01/17/13














Internal Medicine Assmt/Plan





- Assessment


Assessment: 





1.HTN.


2.HYPOTHYROIISIM.


3.DEMENTIA.


4.PSYCHOSIS





- Plan


Plan: 





CONTINUE ON CURRENT MEDICATION AND DIET.

## 2018-08-31 NOTE — PROGRESS NOTES
DATE:  08/31/2018



SUBJECTIVE:  Staff was spoken to.  The patient is interviewed.  Mood is noted to

be irritable.  Affect is constricted.  The patient is pacing most of the time on

the unit.  Paranoid delusions are noted.  The patient's coping skills are noted

to be extremely poor.



ASSESSMENT:  The patient is grossly psychotic.



PLAN:  To continue the patient with the supportive therapy and follow up.





DD: 08/31/2018 11:19

DT: 08/31/2018 21:45

[facility]# [id number]  [id number]

## 2018-09-01 RX ADMIN — Medication SCH TAB: at 09:22

## 2018-09-01 RX ADMIN — LEVOTHYROXINE SODIUM SCH MG: 75 TABLET ORAL at 06:43

## 2018-09-01 NOTE — CONSULTATION
DATE OF CONSULTATION:     08/31/2018



REFERRING PHYSICIAN:  Arvind Land MD



TYPE OF CONSULTATION:  Psychology.



HISTORY OF PRESENT ILLNESS:  The patient is a 73-year-old  male.  The

patient is a resident of [facility].  The patient is known to this

writer from multiple previous hospitalizations.  The following is by record

review and by the patient's self-report.  The patient is being admitted due to

acute agitation as well as attempting to strike out at staff members at his

placement.  Upon interview, the patient appears to be friendly and is responding

to behavioral redirection.  The staff at the patient's facility report that he

has been verbally going off on the staff as well as trying to leave the

facility.  The patient denied any suicidal ideation, plan or intention.



PAST MEDICAL HISTORY:  Please see history and physical by Dr. Albert.



PAST PSYCHIATRIC HISTORY:  The patient has a long history of schizoaffective

disorder.  The patient is under the care of a psychiatrist and psychologist at

his placement.  The patient has multiple previous hospitalizations here at

[facility] on the Geropsychiatric Unit.



SUBSTANCE ABUSE HISTORY:  None.



PSYCHOSOCIAL HISTORY:  The patient is a resident of [facility].  The patient is single and never  with no children.  The

patient's family is not involved in his care.  The patient has no specific

Mosque affiliation.  The patient denied any history of physical or sexual

abuse.  The patient denies any current legal problems.



MENTAL STATUS EXAMINATION:  The patient appears to be his stated age.  The

patient's attitude is mostly cooperative.  Eye contact is fair.  Speech is slow

and mumbling.  Mood is easily irritated.  Affect is mood congruent and reactive.

Thought process shows to be confused with loose associations.  The patient

seems to be responding to internal stimuli.  The patient denied any auditory or

visual hallucinations.  The patient's behavior on the unit as reported by staff

is intermittently yelling and demanding to leave.  The patient is

pacing most of the time on the unit.  There is evidence of paranoid ideation. 

The patient denied any suicidal ideation, plan or intention.  Impulse control is

inadequate.  Concentration is poor.  Sensorium is alert and oriented to person

and place.  The patient did not participate in the memory assessment.  The

patient did not participate in the interpretation of proverbs.  Insight is

impaired.  Judgment is impaired.



DIAGNOSTIC IMPRESSION:

AXIS I:  Schizoaffective disorder by history.

AXIS II:  Deferred.

AXIS III:  Per Dr. Albert.



TREATMENT PLAN:  The patient has been seen by Dr. Land for psychiatric

evaluation and for the management of the patient's psychotropic medications. 

According to review of the record, the patient is continued on Depakote 500 mg

twice a day and olanzapine 20 mg at bedtime.  The patient is also continued on

Cogentin at 1 mg b.i.d. p.r.n.  We will provide supportive psychotherapy to

include reality orientation, reality differentiation and reality integration. 

We will provide de-escalation as well as behavioral redirection and limit

setting.  We will encourage the patient to demonstrate emotional and

self-regulation prior to his discharge.  We will continue to provide coping

strategies for phase of life issues as well as chronic severe mental illness.



Thank you, Dr. Land for this consult and the opportunity to participate in

this patient's care.





DD: 09/01/2018 09:40

DT: 09/01/2018 20:51

JOB# [id number]  [id number]

MTDD

## 2018-09-01 NOTE — GENERAL PROGRESS NOTE
Subjective





- Review of Systems


Service Date: 09/01/18


Subjective: 





resting comfortably


no distress





Objective





- Results


Result Diagrams: 


 08/28/18 13:33





 08/28/18 13:33


Recent Labs: 


 Laboratory Last Values











WBC  9.4 Th/cmm (4.8-10.8)   08/28/18  13:33    


 


RBC  4.78 Mil/cmm (3.80-5.80)   08/28/18  13:33    


 


Hgb  14.2 gm/dL (12-16)   08/28/18  13:33    


 


Hct  42.5 % (41.0-60)   08/28/18  13:33    


 


MCV  89.0 fl (80-99)   08/28/18  13:33    


 


MCH  29.6 pg (27.0-31.0)   08/28/18  13:33    


 


MCHC Differential  33.3 pg (28.0-36.0)   08/28/18  13:33    


 


RDW  13.5 % (11.5-20.0)   08/28/18  13:33    


 


Plt Count  245 Th/cmm (150-400)   08/28/18  13:33    


 


MPV  7.6 fl  08/28/18  13:33    


 


Neutrophils %  79.2 % (40.0-80.0)   08/28/18  13:33    


 


Lymphocytes %  12.9 % (20.0-50.0)  L  08/28/18  13:33    


 


Monocytes %  5.3 % (2.0-10.0)   08/28/18  13:33    


 


Eosinophils %  0.7 % (0.0-5.0)   08/28/18  13:33    


 


Basophils %  1.9 % (0.0-2.0)   08/28/18  13:33    


 


Sodium  130 mEq/L (136-145)  L  08/28/18  13:33    


 


Potassium  4.3 mEq/L (3.5-5.1)   08/28/18  13:33    


 


Chloride  99 mEq/L ()   08/28/18  13:33    


 


Carbon Dioxide  25.7 mEq/L (21.0-31.0)   08/28/18  13:33    


 


Anion Gap  9.6  (7.0-16.0)   08/28/18  13:33    


 


BUN  17 mg/dL (7-25)   08/28/18  13:33    


 


Creatinine  1.0 mg/dL (0.7-1.3)   08/28/18  13:33    


 


Est GFR ( Amer)  TNP   08/28/18  13:33    


 


Est GFR (Non-Af Amer)  TNP   08/28/18  13:33    


 


BUN/Creatinine Ratio  17.0   08/28/18  13:33    


 


Glucose  135 mg/dL ()  H  08/28/18  13:33    


 


Hemoglobin A1c %  6.3 % (4.0-6.0)  H  08/28/18  13:33    


 


Calcium  8.8 mg/dL (8.6-10.3)   08/28/18  13:33    


 


Total Bilirubin  0.5 mg/dL (0.3-1.0)   08/28/18  13:33    


 


AST  31 U/L (13-39)   08/28/18  13:33    


 


ALT  17 U/L (7-52)   08/28/18  13:33    


 


Alkaline Phosphatase  35 U/L ()   08/28/18  13:33    


 


Troponin I  0.01 ng/mL (0.01-0.05)   08/28/18  13:33    


 


Total Protein  6.1 gm/dL (6.0-8.3)   08/28/18  13:33    


 


Albumin  3.8 gm/dL (4.2-5.5)  L  08/28/18  13:33    


 


Globulin  2.3 gm/dL  08/28/18  13:33    


 


Albumin/Globulin Ratio  1.7  (1.0-1.8)   08/28/18  13:33    


 


Triglycerides  98 mg/dL (<150)   08/28/18  13:33    


 


Cholesterol  158 mg/dL (<200)   08/28/18  13:33    


 


LDL Cholesterol Direct  79 mg/dL ()   08/28/18  13:33    


 


HDL Cholesterol  61 mg/dL (23-92)   08/28/18  13:33    


 


TSH  0.87 uIU/ml (0.34-5.60)   08/28/18  13:33    


 


Salicylates  < 25.0 mg/L (30.0-100.0)  L  08/28/18  13:33    


 


Acetaminophen  < 10.0 ug/mL (10.0-30.0)  L  08/28/18  13:33    


 


Ethyl Alcohol  < 10 mg/dL (0-10)   08/28/18  13:33    


 


RPR  NONREACTIVE  (NONREACTIVE)   08/28/18  13:33    














- Physical Exam


Vitals and I&O: 


 Vital Signs











Temp  98.4 F   09/01/18 07:01


 


Pulse  84   09/01/18 09:23


 


Resp  19   09/01/18 07:01


 


BP  149/78   09/01/18 09:23


 


Pulse Ox  97   09/01/18 07:01








 Intake & Output











 08/31/18 09/01/18 09/01/18





 18:59 06:59 18:59


 


Intake Total 1500  


 


Balance 1500  


 


Intake:   


 


  Oral 1500  


 


Other:   


 


  # Voids 4  


 


  # Bowel Movements 0  











Active Medications: 


Current Medications





Acetaminophen (Tylenol)  650 mg PO Q4HR PRN


   PRN Reason: Mild Pain / Temp above 100


   Stop: 10/27/18 16:04


Benztropine Mesylate (Cogentin)  1 mg PO BID PRN


   PRN Reason: extrapyramidal side effects


   Stop: 10/27/18 16:04


Divalproex Sodium (Depakote Er)  500 mg PO Q12HR DNAYA; Protocol


   Stop: 10/27/18 22:59


   Last Admin: 09/01/18 09:21 Dose:  500 mg


Docusate Sodium (Colace)  100 mg PO BID DANYA


   Stop: 10/27/18 16:59


   Last Admin: 09/01/18 16:51 Dose:  100 mg


Levothyroxine Sodium (Synthroid)  0.075 mg PO QDAC DANYA


   Stop: 10/28/18 07:29


   Last Admin: 09/01/18 06:43 Dose:  0.075 mg


Lorazepam (Ativan)  0.5 mg PO Q6HR PRN; Protocol


   PRN Reason: Anxiety


   Stop: 10/27/18 20:03


   Last Admin: 09/01/18 11:25 Dose:  0.5 mg


Metoprolol Succinate (Toprol Xl)  50 mg PO DAILY [facility]


   Stop: 10/28/18 08:59


   Last Admin: 09/01/18 09:23 Dose:  50 mg


Olanzapine (Zyprexa)  20 mg PO HS DANYA; Protocol


   Stop: 10/27/18 22:59


   Last Admin: 08/31/18 20:59 Dose:  20 mg








General: Alert


HEENT: Atraumatic, PERRLA


Neck: Supple, JVD, Thyromegaly


Cardiovascular: Regular rate, Normal S1, Normal S2


Lungs: Clear to auscultation


Abdomen: Bowel sounds, Soft





- Procedures


Procedures: 


 Procedures











Procedure Code Date


 


GROUP PSYCHOTHERAPY [id number] 05/02/16


 


GROUP PSYCHOTHERAPY [id number] 05/02/16


 


GROUP PSYCHOTHERAPY [id number] 01/18/16


 


GROUP PSYCHOTHERAPY [id number] 01/18/16


 


INDIVID PSYCHOTHERAP NEC 94.39 05/20/08


 


OTHER GROUP THERAPY 94.44 08/27/15


 


RECREATIONAL THERAPY 93.81 01/17/13














Assessment/Plan





- Assessment


Assessment: 





1.HTN.


2.HYPOTHYROIISIM.


3.DEMENTIA.


4.PSYCHOSIS





- Plan


Plan: 





continue current treatment

## 2018-09-01 NOTE — PROGRESS NOTES
DATE:  09/01/2018



SUBJECTIVE:  Staff was spoken to.  The patient is interviewed.  Mood is noted to

be irritable.  Affect is constricted.  Insight and judgment are noted to be

still impaired.  Impulse control is noted to be limited.  Coping skills are

noted to be limited.  The patient has been currently on the Depakote and

Zyprexa, has been able to tolerate the medications.  The patient is reporting

that the medications are making him too sleepy, but the patient aggressive ____

has to be kept in mind and the patient at this time is able to tolerate the

medications.  No side effects to medications are noted.



ASSESSMENT:  The patient is still psychotic and impulsive.



PLAN:  To continue the patient with the supportive therapy.  I encouraged the

patient to verbalize the concerns rather than to act out.





DD: 09/01/2018 11:53

DT: 09/01/2018 13:20

JOB# [id number]  [id number]

## 2018-09-02 RX ADMIN — LEVOTHYROXINE SODIUM SCH MG: 75 TABLET ORAL at 06:28

## 2018-09-02 RX ADMIN — Medication SCH TAB: at 09:47

## 2018-09-02 NOTE — PROGRESS NOTES
DATE:  09/02/2018



SUBJECTIVE:  Staff was spoken to.  The patient is interviewed.  Mood is noted to

be irritable.  Affect is constricted.  Coping skills are noted to be poor. 

Sleep and appetite are also noted to be very poor.  The patient has been pacing

on the unit.  Paranoid delusions are noted.  The patient is responding to

internal stimuli.  The patient is stating that he does not want to take any more

of the medication and does not want to change any of the medications.  The

patient is currently on 20 mg of Zyprexa and 500 mg twice a day of the Depakote.



ASSESSMENT:  The patient is still psychotic.



PLAN:  To continue the patient with the supportive therapy and followup.





DD: 09/02/2018 10:54

DT: 09/02/2018 12:46

[facility]# [id number]  [id number]

## 2018-09-02 NOTE — GENERAL PROGRESS NOTE
Subjective





- Review of Systems


Service Date: 09/02/18


Subjective: 





resting comfortably


no distress





Objective





- Results


Result Diagrams: 


 08/28/18 13:33





 08/28/18 13:33


Recent Labs: 


 Laboratory Last Values











WBC  9.4 Th/cmm (4.8-10.8)   08/28/18  13:33    


 


RBC  4.78 Mil/cmm (3.80-5.80)   08/28/18  13:33    


 


Hgb  14.2 gm/dL (12-16)   08/28/18  13:33    


 


Hct  42.5 % (41.0-60)   08/28/18  13:33    


 


MCV  89.0 fl (80-99)   08/28/18  13:33    


 


MCH  29.6 pg (27.0-31.0)   08/28/18  13:33    


 


MCHC Differential  33.3 pg (28.0-36.0)   08/28/18  13:33    


 


RDW  13.5 % (11.5-20.0)   08/28/18  13:33    


 


Plt Count  245 Th/cmm (150-400)   08/28/18  13:33    


 


MPV  7.6 fl  08/28/18  13:33    


 


Neutrophils %  79.2 % (40.0-80.0)   08/28/18  13:33    


 


Lymphocytes %  12.9 % (20.0-50.0)  L  08/28/18  13:33    


 


Monocytes %  5.3 % (2.0-10.0)   08/28/18  13:33    


 


Eosinophils %  0.7 % (0.0-5.0)   08/28/18  13:33    


 


Basophils %  1.9 % (0.0-2.0)   08/28/18  13:33    


 


Sodium  130 mEq/L (136-145)  L  08/28/18  13:33    


 


Potassium  4.3 mEq/L (3.5-5.1)   08/28/18  13:33    


 


Chloride  99 mEq/L ()   08/28/18  13:33    


 


Carbon Dioxide  25.7 mEq/L (21.0-31.0)   08/28/18  13:33    


 


Anion Gap  9.6  (7.0-16.0)   08/28/18  13:33    


 


BUN  17 mg/dL (7-25)   08/28/18  13:33    


 


Creatinine  1.0 mg/dL (0.7-1.3)   08/28/18  13:33    


 


Est GFR ( Amer)  TNP   08/28/18  13:33    


 


Est GFR (Non-Af Amer)  TNP   08/28/18  13:33    


 


BUN/Creatinine Ratio  17.0   08/28/18  13:33    


 


Glucose  135 mg/dL ()  H  08/28/18  13:33    


 


Hemoglobin A1c %  6.3 % (4.0-6.0)  H  08/28/18  13:33    


 


Calcium  8.8 mg/dL (8.6-10.3)   08/28/18  13:33    


 


Total Bilirubin  0.5 mg/dL (0.3-1.0)   08/28/18  13:33    


 


AST  31 U/L (13-39)   08/28/18  13:33    


 


ALT  17 U/L (7-52)   08/28/18  13:33    


 


Alkaline Phosphatase  35 U/L ()   08/28/18  13:33    


 


Troponin I  0.01 ng/mL (0.01-0.05)   08/28/18  13:33    


 


Total Protein  6.1 gm/dL (6.0-8.3)   08/28/18  13:33    


 


Albumin  3.8 gm/dL (4.2-5.5)  L  08/28/18  13:33    


 


Globulin  2.3 gm/dL  08/28/18  13:33    


 


Albumin/Globulin Ratio  1.7  (1.0-1.8)   08/28/18  13:33    


 


Triglycerides  98 mg/dL (<150)   08/28/18  13:33    


 


Cholesterol  158 mg/dL (<200)   08/28/18  13:33    


 


LDL Cholesterol Direct  79 mg/dL ()   08/28/18  13:33    


 


HDL Cholesterol  61 mg/dL (23-92)   08/28/18  13:33    


 


TSH  0.87 uIU/ml (0.34-5.60)   08/28/18  13:33    


 


Salicylates  < 25.0 mg/L (30.0-100.0)  L  08/28/18  13:33    


 


Acetaminophen  < 10.0 ug/mL (10.0-30.0)  L  08/28/18  13:33    


 


Ethyl Alcohol  < 10 mg/dL (0-10)   08/28/18  13:33    


 


RPR  NONREACTIVE  (NONREACTIVE)   08/28/18  13:33    














- Physical Exam


Vitals and I&O: 


 Vital Signs











Temp  98.2 F   09/02/18 06:42


 


Pulse  60   09/02/18 06:42


 


Resp  18   09/02/18 06:42


 


BP  134/69   09/02/18 06:42


 


Pulse Ox  98   09/02/18 06:42








 Intake & Output











 09/01/18 09/02/18 09/02/18





 18:59 06:59 18:59


 


Intake Total 1200 240 


 


Balance 1200 240 


 


Intake:   


 


  Oral 1200 240 


 


Other:   


 


  # Voids 4 1 


 


  # Bowel Movements 1  











Active Medications: 


Current Medications





Acetaminophen (Tylenol)  650 mg PO Q4HR PRN


   PRN Reason: Mild Pain / Temp above 100


   Stop: 10/27/18 16:04


Benztropine Mesylate (Cogentin)  1 mg PO BID PRN


   PRN Reason: extrapyramidal side effects


   Stop: 10/27/18 16:04


Divalproex Sodium (Depakote Er)  500 mg PO Q12HR DANYA; Protocol


   Stop: 10/27/18 22:59


   Last Admin: 09/02/18 09:47 Dose:  500 mg


Docusate Sodium (Colace)  100 mg PO BID DANYA


   Stop: 10/27/18 16:59


   Last Admin: 09/02/18 09:48 Dose:  100 mg


Levothyroxine Sodium (Synthroid)  0.075 mg PO QDAC DANYA


   Stop: 10/28/18 07:29


   Last Admin: 09/02/18 06:28 Dose:  0.075 mg


Lorazepam (Ativan)  0.5 mg PO Q6HR PRN; Protocol


   PRN Reason: Anxiety


   Stop: 10/27/18 20:03


   Last Admin: 09/01/18 20:43 Dose:  0.5 mg


Metoprolol Succinate (Toprol Xl)  50 mg PO DAILY DANYA


   Stop: 10/28/18 08:59


   Last Admin: 09/02/18 09:48 Dose:  Not Given


Olanzapine (Zyprexa)  20 mg PO HS DANYA; Protocol


   Stop: 10/27/18 22:59


   Last Admin: 09/01/18 20:43 Dose:  20 mg








General: Alert


HEENT: Atraumatic, PERRLA


Neck: Supple, JVD, Thyromegaly


Cardiovascular: Regular rate, Normal S1, Normal S2


Lungs: Clear to auscultation


Abdomen: Bowel sounds, Soft





- Procedures


Procedures: 


 Procedures











Procedure Code Date


 


GROUP PSYCHOTHERAPY 72742 05/02/16


 


GROUP PSYCHOTHERAPY GZHZZZZ 05/02/16


 


GROUP PSYCHOTHERAPY 19841 01/18/16


 


GROUP PSYCHOTHERAPY GZHZZZZ 01/18/16


 


INDIVID PSYCHOTHERAP NEC 94.39 05/20/08


 


OTHER GROUP THERAPY 94.44 08/27/15


 


RECREATIONAL THERAPY 93.81 01/17/13














Assessment/Plan





- Assessment


Assessment: 





1.HTN.


2.HYPOTHYROIISIM.


3.DEMENTIA.


4.PSYCHOSIS





- Plan


Plan: 





continue current treatment

## 2018-09-03 RX ADMIN — LEVOTHYROXINE SODIUM SCH MG: 75 TABLET ORAL at 06:29

## 2018-09-03 RX ADMIN — Medication SCH TAB: at 08:50

## 2018-09-03 NOTE — PROGRESS NOTES
DATE:  09/03/2018



PSYCHIATRIC PROGRESS NOTE



SUBJECTIVE:  Staff was spoken to.  The patient is interviewed.  Mood is noted to

be irritable.  Affect is constricted.  The patient is pacing most of the time on

the unit.  Coping skills at this time are noted to be very poor.  The patient

has been having difficult time to cope with the stress.  Continues to be

paranoid.  The patient is currently on the Zyprexa and has been able to tolerate

the medication.



ASSESSMENT:  The patient is still psychotic.



PLAN:  To continue the patient with the supportive therapy and followup.





DD: 09/03/2018 14:46

DT: 09/03/2018 20:50

JOB# [id number]  [id number]

## 2018-09-03 NOTE — GENERAL PROGRESS NOTE
Subjective





- Review of Systems


Service Date: 18


Subjective: 





resting comfortably


no distress





Objective





- Results


Result Diagrams: 


 18 13:33





 18 13:33


Recent Labs: 


 Laboratory Last Values











WBC  9.4 Th/cmm (4.8-10.8)   18  13:33    


 


RBC  4.78 Mil/cmm (3.80-5.80)   18  13:33    


 


Hgb  14.2 gm/dL (12-16)   18  13:33    


 


Hct  42.5 % (41.0-60)   18  13:33    


 


MCV  89.0 fl (80-99)   18  13:33    


 


MCH  29.6 pg (27.0-31.0)   18  13:33    


 


MCHC Differential  33.3 pg (28.0-36.0)   18  13:33    


 


RDW  13.5 % (11.5-20.0)   18  13:33    


 


Plt Count  245 Th/cmm (150-400)   18  13:33    


 


MPV  7.6 fl  18  13:33    


 


Neutrophils %  79.2 % (40.0-80.0)   18  13:33    


 


Lymphocytes %  12.9 % (20.0-50.0)  L  18  13:33    


 


Monocytes %  5.3 % (2.0-10.0)   18  13:33    


 


Eosinophils %  0.7 % (0.0-5.0)   18  13:33    


 


Basophils %  1.9 % (0.0-2.0)   18  13:33    


 


Sodium  130 mEq/L (136-145)  L  18  13:33    


 


Potassium  4.3 mEq/L (3.5-5.1)   18  13:33    


 


Chloride  99 mEq/L ()   18  13:33    


 


Carbon Dioxide  25.7 mEq/L (21.0-31.0)   18  13:33    


 


Anion Gap  9.6  (7.0-16.0)   18  13:33    


 


BUN  17 mg/dL (7-25)   18  13:33    


 


Creatinine  1.0 mg/dL (0.7-1.3)   18  13:33    


 


Est GFR ( Amer)  TNP   18  13:33    


 


Est GFR (Non-Af Amer)  TNP   18  13:33    


 


BUN/Creatinine Ratio  17.0   18  13:33    


 


Glucose  135 mg/dL ()  H  18  13:33    


 


Hemoglobin A1c %  6.3 % (4.0-6.0)  H  18  13:33    


 


Calcium  8.8 mg/dL (8.6-10.3)   18  13:33    


 


Total Bilirubin  0.5 mg/dL (0.3-1.0)   18  13:33    


 


AST  31 U/L (13-39)   18  13:33    


 


ALT  17 U/L (7-52)   18  13:33    


 


Alkaline Phosphatase  35 U/L ()   18  13:33    


 


Troponin I  0.01 ng/mL (0.01-0.05)   18  13:33    


 


Total Protein  6.1 gm/dL (6.0-8.3)   18  13:33    


 


Albumin  3.8 gm/dL (4.2-5.5)  L  18  13:33    


 


Globulin  2.3 gm/dL  18  13:33    


 


Albumin/Globulin Ratio  1.7  (1.0-1.8)   18  13:33    


 


Triglycerides  98 mg/dL (<150)   18  13:33    


 


Cholesterol  158 mg/dL (<200)   18  13:33    


 


LDL Cholesterol Direct  79 mg/dL ()   18  13:33    


 


HDL Cholesterol  61 mg/dL (23-92)   18  13:33    


 


TSH  0.87 uIU/ml (0.34-5.60)   18  13:33    


 


Salicylates  < 25.0 mg/L (30.0-100.0)  L  18  13:33    


 


Acetaminophen  < 10.0 ug/mL (10.0-30.0)  L  18  13:33    


 


Ethyl Alcohol  < 10 mg/dL (0-10)   18  13:33    


 


RPR  NONREACTIVE  (NONREACTIVE)   18  13:33    














- Physical Exam


Vitals and I&O: 


 Vital Signs











Temp  98.8 F   18 14:00


 


Pulse  60   18 14:00


 


Resp  20   18 14:00


 


BP  131/68   18 14:00


 


Pulse Ox  98   18 14:00








 Intake & Output











 18





 18:59 06:59 18:59


 


Intake Total 1400 240 


 


Balance 1400 240 


 


Weight (lbs)  75.75 kg 


 


Intake:   


 


  Oral 1400 240 


 


Other:   


 


  # Voids  3 


 


  # Bowel Movements 1 0 


 


  Weight Source  Bedscale 











Active Medications: 


Current Medications





Acetaminophen (Tylenol)  650 mg PO Q4HR PRN


   PRN Reason: Mild Pain / Temp above 100


   Stop: 10/27/18 16:04


Benztropine Mesylate (Cogentin)  1 mg PO BID PRN


   PRN Reason: extrapyramidal side effects


   Stop: 10/27/18 16:04


Divalproex Sodium (Depakote Er)  500 mg PO Q12HR DANYA; Protocol


   Stop: 10/27/18 22:59


   Last Admin: 18 08:50 Dose:  500 mg


Docusate Sodium (Colace)  100 mg PO BID [facility]


   Stop: 10/27/18 16:59


   Last Admin: 18 08:50 Dose:  100 mg


Levothyroxine Sodium (Synthroid)  0.075 mg PO QDAC DANYA


   Stop: 10/28/18 07:29


   Last Admin: 18 06:29 Dose:  0.075 mg


Lorazepam (Ativan)  0.5 mg PO Q6HR PRN; Protocol


   PRN Reason: Anxiety


   Stop: 10/27/18 20:03


   Last Admin: 18 20:43 Dose:  0.5 mg


Metoprolol Succinate (Toprol Xl)  50 mg PO DAILY [facility]


   Stop: 10/28/18 08:59


   Last Admin: 18 08:50 Dose:  Not Given


Olanzapine (Zyprexa)  20 mg PO HS DANYA; Protocol


   Stop: 10/27/18 22:59


   Last Admin: 18 21:11 Dose:  20 mg








General: Alert


HEENT: Atraumatic, PERRLA


Neck: Supple, JVD, Thyromegaly


Cardiovascular: Regular rate, Normal S1, Normal S2


Lungs: Clear to auscultation


Abdomen: Bowel sounds, Soft





- Procedures


Procedures: 


 Procedures











Procedure Code Date


 


GROUP PSYCHOTHERAPY 02952 16


 


GROUP PSYCHOTHERAPY GZHZZZZ 16


 


GROUP PSYCHOTHERAPY [id number] 16


 


GROUP PSYCHOTHERAPY GZHZZZZ 16


 


INDIVID PSYCHOTHERAP NEC 94.39 08


 


OTHER GROUP THERAPY 94.44 08/27/15


 


RECREATIONAL THERAPY 93.81 13














Assessment/Plan





- Assessment


Assessment: 





1.HTN.


2.HYPOTHYROIISIM.


3.DEMENTIA.


4.PSYCHOSIS





- Plan


Plan: 





continue current treatment





Nutritional Asmnt/Malnutr-PDOC





- Dietary Evaluation


Malnutrition Findings (Please click <Entered> for more info): 








Nutritional Asmnt/Malnutrition                             Start:  18 10:

05


Text:                                                      Status: Active      

  


Freq:                                                                          

  


Protocol:                                                                      

  


 Document     18 10:05  ELE  (Rec: 18 10:25  ELE COLEMAN-

FNS1)


 Nutritional Asmnt/Malnutrition


     Patient General Information


      Nutritional Screening                      Low Risk


      Diagnosis                                  Psychosis


      Pertinent Medical Hx/Surgical Hx           HTN, hypothyroidism, dementia,


                                                 psychosis


      Current Diet Order/ Nutrition Support      Regular


      Patient / S.O                              Not Indicated


      Pertinent Medications                      colace, synthroid


      Pertinent Labs                             () Na 130, albumin 3.8


     Nutritional Hx/Data


      Height                                     1.8 m


      Height (Calculated Centimeters)            180.3


      Current Weight (lbs)                       75.75 kg


      Weight (Calculated Kilograms)              75.7


      Weight (Calculated Grams)                  09911.9


      Ideal Body Weight                          172


      % Ideal Body Weight                        98


      Body Mass Index (BMI)                      23.3


      Recent Weight Change                       No


      Weight Status                              Approriate


     GI Symptoms


      GI Symptoms                                None


      Last BM                                    9/2 x 1


      Difficult in:                              None


      Food Allergies                             No


      Cultural/Ethnic/Faith Belief           none indicated


      Usual diet at home                         unknown


      Skin Integrity/Comment:                    Thom 19, intact


      Current %PO                                Good (%)


     Estimated Nutritional Goals


      BEE in Kcals:                              Using Current wt


      Calories/Kcals/Kg                          25-30 kcal/kg (using CBW 75.


                                                 9kg)


      Kcals Calculated                           ~8752-8798 kcal/day


      Protein:                                   Using Current wt


      Protein g/k gm/kg


      Protein Calculated                         ~75gm/day


      Fluid: ml                                  ~4483-3467 ml/day (1 ml/kcal)


     Nutritional Problem


      1. Problem


       Problem                                   Altered nutrition related lab


                                                 values related to


       Etiology                                  electrolyte imbalance aeb


       Signs/Symptoms:                           Na 130


     Intervention/Recommendation


      Comments                                   1. Continue regular diet as


                                                 tolerated by patient.


                                                 2. Consider rechecking Na


                                                 level, if continues to be


                                                 hyponatremic, consider fluid


                                                 restriction


     Expected Outcomes/Goals


      Expected Outcomes/Goals                    Oral intake >75% of mals,


                                                 weight stable, nutrition


                                                 related labs WNL

## 2018-09-04 RX ADMIN — Medication SCH TAB: at 09:01

## 2018-09-04 RX ADMIN — LEVOTHYROXINE SODIUM SCH MG: 75 TABLET ORAL at 06:33

## 2018-09-04 NOTE — INTERNAL MEDICINE PROG NOTE
Internal Medicine Subjective





- Subjective


Service Date: 18


Patient seen and examined:: with staff


Patient is:: awake, verbal, talking, confused


Per staff patient has:: no adverse event





Internal Medicine Objective





- Results


Result Diagrams: 


 18 13:33





 18 13:33


Recent Labs: 


 Laboratory Last Values











WBC  9.4 Th/cmm (4.8-10.8)   18  13:33    


 


RBC  4.78 Mil/cmm (3.80-5.80)   18  13:33    


 


Hgb  14.2 gm/dL (12-16)   18  13:33    


 


Hct  42.5 % (41.0-60)   18  13:33    


 


MCV  89.0 fl (80-99)   18  13:33    


 


MCH  29.6 pg (27.0-31.0)   18  13:33    


 


MCHC Differential  33.3 pg (28.0-36.0)   18  13:33    


 


RDW  13.5 % (11.5-20.0)   18  13:33    


 


Plt Count  245 Th/cmm (150-400)   18  13:33    


 


MPV  7.6 fl  18  13:33    


 


Neutrophils %  79.2 % (40.0-80.0)   18  13:33    


 


Lymphocytes %  12.9 % (20.0-50.0)  L  18  13:33    


 


Monocytes %  5.3 % (2.0-10.0)   18  13:33    


 


Eosinophils %  0.7 % (0.0-5.0)   18  13:33    


 


Basophils %  1.9 % (0.0-2.0)   18  13:33    


 


Sodium  130 mEq/L (136-145)  L  18  13:33    


 


Potassium  4.3 mEq/L (3.5-5.1)   18  13:33    


 


Chloride  99 mEq/L ()   18  13:33    


 


Carbon Dioxide  25.7 mEq/L (21.0-31.0)   18  13:33    


 


Anion Gap  9.6  (7.0-16.0)   18  13:33    


 


BUN  17 mg/dL (7-25)   18  13:33    


 


Creatinine  1.0 mg/dL (0.7-1.3)   18  13:33    


 


Est GFR ( Amer)  TNP   18  13:33    


 


Est GFR (Non-Af Amer)  TNP   18  13:33    


 


BUN/Creatinine Ratio  17.0   18  13:33    


 


Glucose  135 mg/dL ()  H  18  13:33    


 


Hemoglobin A1c %  6.3 % (4.0-6.0)  H  18  13:33    


 


Calcium  8.8 mg/dL (8.6-10.3)   18  13:33    


 


Total Bilirubin  0.5 mg/dL (0.3-1.0)   18  13:33    


 


AST  31 U/L (13-39)   18  13:33    


 


ALT  17 U/L (7-52)   18  13:33    


 


Alkaline Phosphatase  35 U/L ()   18  13:33    


 


Troponin I  0.01 ng/mL (0.01-0.05)   18  13:33    


 


Total Protein  6.1 gm/dL (6.0-8.3)   18  13:33    


 


Albumin  3.8 gm/dL (4.2-5.5)  L  18  13:33    


 


Globulin  2.3 gm/dL  18  13:33    


 


Albumin/Globulin Ratio  1.7  (1.0-1.8)   18  13:33    


 


Triglycerides  98 mg/dL (<150)   18  13:33    


 


Cholesterol  158 mg/dL (<200)   18  13:33    


 


LDL Cholesterol Direct  79 mg/dL ()   18  13:33    


 


HDL Cholesterol  61 mg/dL (23-92)   18  13:33    


 


TSH  0.87 uIU/ml (0.34-5.60)   18  13:33    


 


Salicylates  < 25.0 mg/L (30.0-100.0)  L  18  13:33    


 


Acetaminophen  < 10.0 ug/mL (10.0-30.0)  L  18  13:33    


 


Ethyl Alcohol  < 10 mg/dL (0-10)   18  13:33    


 


RPR  NONREACTIVE  (NONREACTIVE)   18  13:33    














- Physical Exam


Vitals and I&O: 


 Vital Signs











Temp  98.8 F   18 21:08


 


Pulse  70   18 14:00


 


Resp  20   18 14:00


 


BP  113/66   18 14:00


 


Pulse Ox  97   18 14:00








 Intake & Output











 18





 06:59 18:59 06:59


 


Intake Total 240 900 


 


Balance 240 900 


 


Intake:   


 


  Oral 240 900 


 


Other:   


 


  # Voids 2 4 


 


  # Bowel Movements  1 











Active Medications: 


Current Medications





Acetaminophen (Tylenol)  650 mg PO Q4HR PRN


   PRN Reason: Mild Pain / Temp above 100


   Stop: 10/27/18 16:04


Benztropine Mesylate (Cogentin)  1 mg PO BID PRN


   PRN Reason: extrapyramidal side effects


   Stop: 10/27/18 16:04


Divalproex Sodium (Depakote Er)  500 mg PO Q12HR DANYA; Protocol


   Stop: 10/27/18 22:59


   Last Admin: 18 09:01 Dose:  500 mg


Docusate Sodium (Colace)  100 mg PO BID DANYA


   Stop: 10/27/18 16:59


   Last Admin: 18 17:24 Dose:  100 mg


Levothyroxine Sodium (Synthroid)  0.075 mg PO QDAC DANYA


   Stop: 10/28/18 07:29


   Last Admin: 18 06:33 Dose:  0.075 mg


Lorazepam (Ativan)  0.5 mg PO Q6HR PRN; Protocol


   PRN Reason: Anxiety


   Stop: 10/27/18 20:03


   Last Admin: 18 08:58 Dose:  0.5 mg


Metoprolol Succinate (Toprol Xl)  50 mg PO DAILY DANYA


   Stop: 10/28/18 08:59


   Last Admin: 18 09:00 Dose:  50 mg


Olanzapine (Zyprexa)  20 mg PO HS DANYA; Protocol


   Stop: 10/27/18 22:59


   Last Admin: 18 21:11 Dose:  20 mg








General: demented


HEENT: NC/AT, PERRLA, EOMI, anicteric sclerae, throat clear


Neck: Supple, No JVD, No thyromegaly, +2 carotid pulse wo bruit


Lungs: CTAB


Cardiovascular: RRR, Normal S1, Normal S2, without murmur


Abdomen: soft, non-tender, non-distended


Extremities: clear


Neurological: no change





- Procedures


Procedures: 


 Procedures











Procedure Code Date


 


GROUP PSYCHOTHERAPY 60790 16


 


GROUP PSYCHOTHERAPY GZHZZZZ 16


 


GROUP PSYCHOTHERAPY 99595 16


 


GROUP PSYCHOTHERAPY GZHZZZZ 16


 


INDIVID PSYCHOTHERAP NEC 94.39 08


 


OTHER GROUP THERAPY 94.44 08/27/15


 


RECREATIONAL THERAPY 93.81 13














Internal Medicine Assmt/Plan





- Assessment


Assessment: 





1.HTN.


2.HYPOTHYROIISIM.


3.DEMENTIA.


4.PSYCHOSIS





- Plan


Plan: 





CONTINUE ON CURRENT MEDICATION AND DIET.





Nutritional Asmnt/Malnutr-PDOC





- Dietary Evaluation


Malnutrition Findings (Please click <Entered> for more info): 








Nutritional Asmnt/Malnutrition                             Start:  18 10:

05


Text:                                                      Status: Complete    

  


Freq:                                                                          

  


Protocol:                                                                      

  


 Document     18 10:05  ELE  (Rec: 18 10:25  ELE COLEMAN-

FNS1)


 Nutritional Asmnt/Malnutrition


     Patient General Information


      Nutritional Screening                      Low Risk


      Diagnosis                                  Psychosis


      Pertinent Medical Hx/Surgical Hx           HTN, hypothyroidism, dementia,


                                                 psychosis


      Subjective Information                     Patient seen in bed at time of


                                                 RD visit. Per nursing notes


                                                 patient remains confused.


                                                 Tolerating current diet order


                                                 without difficulty.


      Current Diet Order/ Nutrition Support      Regular


      Patient / S.O                              Not Indicated


      Pertinent Medications                      colace, synthroid


      Pertinent Labs                             () Na 130, albumin 3.8


     Nutritional Hx/Data


      Height                                     1.8 m


      Height (Calculated Centimeters)            180.3


      Current Weight (lbs)                       75.75 kg


      Weight (Calculated Kilograms)              75.7


      Weight (Calculated Grams)                  57369.9


      Ideal Body Weight                          172


      % Ideal Body Weight                        98


      Body Mass Index (BMI)                      23.3


      Recent Weight Change                       No


      Weight Status                              Approriate


     GI Symptoms


      GI Symptoms                                None


      Last BM                                    9/2 x 1


      Difficult in:                              None


      Food Allergies                             No


      Cultural/Ethnic/[employer] Belief           none indicated


      Usual diet at home                         unknown


      Skin Integrity/Comment:                    Thom 19, intact


      Current %PO                                Good (%)


     Estimated Nutritional Goals


      BEE in Kcals:                              Using Current wt


      Calories/Kcals/Kg                          25-30 kcal/kg (using CBW 75.


                                                 9kg)


      Kcals Calculated                           ~5269-6318 kcal/day


      Protein:                                   Using Current wt


      Protein g/k gm/kg


      Protein Calculated                         ~75gm/day


      Fluid: ml                                  ~9165-9200 ml/day (1 ml/kcal)


     Nutritional Problem


      1. Problem


       Problem                                   Altered nutrition related lab


                                                 values related to


       Etiology                                  electrolyte imbalance aeb


       Signs/Symptoms:                           Na 130


     Intervention/Recommendation


      Comments                                   1. Continue regular diet as


                                                 tolerated by patient.


                                                 2. Consider rechecking Na


                                                 level, if continues to be


                                                 hyponatremic, consider fluid


                                                 restriction


     Expected Outcomes/Goals


      Expected Outcomes/Goals                    Oral intake >75% of mals,


                                                 weight stable, nutrition


                                                 related labs WNL

## 2018-09-05 RX ADMIN — Medication SCH TAB: at 08:11

## 2018-09-05 RX ADMIN — LEVOTHYROXINE SODIUM SCH MG: 75 TABLET ORAL at 06:32

## 2018-09-05 NOTE — PROGRESS NOTES
DATE:  09/04/2018



SUBJECTIVE:  Staff was spoken to.  The patient is interviewed.  Mood is noted to

be irritable.  Affect is constricted.  Insight and judgment are noted to be

still impaired.  Impulse control is noted to be limited.  Coping skills are

noted to be limited.  Continues to be very paranoid and is not making much

sense.  The patient is getting easily agitated when we tried to approach him. 

The patient is currently on Zyprexa and has been able to tolerate the

medication.



ASSESSMENT:  The patient is still grossly psychotic.



PLAN:  To continue the patient with the current medications and followup.





DD: 09/04/2018 19:20

DT: 09/05/2018 02:41

JOB# [id number]  [id number]

## 2018-09-05 NOTE — INTERNAL MEDICINE PROG NOTE
Internal Medicine Subjective





- Subjective


Service Date: 18


Patient seen and examined:: with staff


Patient is:: awake, verbal, talking, confused


Per staff patient has:: no adverse event





Internal Medicine Objective





- Results


Result Diagrams: 


 18 13:33





 18 13:33


Recent Labs: 


 Laboratory Last Values











WBC  9.4 Th/cmm (4.8-10.8)   18  13:33    


 


RBC  4.78 Mil/cmm (3.80-5.80)   18  13:33    


 


Hgb  14.2 gm/dL (12-16)   18  13:33    


 


Hct  42.5 % (41.0-60)   18  13:33    


 


MCV  89.0 fl (80-99)   18  13:33    


 


MCH  29.6 pg (27.0-31.0)   18  13:33    


 


MCHC Differential  33.3 pg (28.0-36.0)   18  13:33    


 


RDW  13.5 % (11.5-20.0)   18  13:33    


 


Plt Count  245 Th/cmm (150-400)   18  13:33    


 


MPV  7.6 fl  18  13:33    


 


Neutrophils %  79.2 % (40.0-80.0)   18  13:33    


 


Lymphocytes %  12.9 % (20.0-50.0)  L  18  13:33    


 


Monocytes %  5.3 % (2.0-10.0)   18  13:33    


 


Eosinophils %  0.7 % (0.0-5.0)   18  13:33    


 


Basophils %  1.9 % (0.0-2.0)   18  13:33    


 


Sodium  130 mEq/L (136-145)  L  18  13:33    


 


Potassium  4.3 mEq/L (3.5-5.1)   18  13:33    


 


Chloride  99 mEq/L ()   18  13:33    


 


Carbon Dioxide  25.7 mEq/L (21.0-31.0)   18  13:33    


 


Anion Gap  9.6  (7.0-16.0)   18  13:33    


 


BUN  17 mg/dL (7-25)   18  13:33    


 


Creatinine  1.0 mg/dL (0.7-1.3)   18  13:33    


 


Est GFR ( Amer)  TNP   18  13:33    


 


Est GFR (Non-Af Amer)  TNP   18  13:33    


 


BUN/Creatinine Ratio  17.0   18  13:33    


 


Glucose  135 mg/dL ()  H  18  13:33    


 


Hemoglobin A1c %  6.3 % (4.0-6.0)  H  18  13:33    


 


Calcium  8.8 mg/dL (8.6-10.3)   18  13:33    


 


Total Bilirubin  0.5 mg/dL (0.3-1.0)   18  13:33    


 


AST  31 U/L (13-39)   18  13:33    


 


ALT  17 U/L (7-52)   18  13:33    


 


Alkaline Phosphatase  35 U/L ()   18  13:33    


 


Troponin I  0.01 ng/mL (0.01-0.05)   18  13:33    


 


Total Protein  6.1 gm/dL (6.0-8.3)   18  13:33    


 


Albumin  3.8 gm/dL (4.2-5.5)  L  18  13:33    


 


Globulin  2.3 gm/dL  18  13:33    


 


Albumin/Globulin Ratio  1.7  (1.0-1.8)   18  13:33    


 


Triglycerides  98 mg/dL (<150)   18  13:33    


 


Cholesterol  158 mg/dL (<200)   18  13:33    


 


LDL Cholesterol Direct  79 mg/dL ()   18  13:33    


 


HDL Cholesterol  61 mg/dL (23-92)   18  13:33    


 


TSH  0.87 uIU/ml (0.34-5.60)   18  13:33    


 


Salicylates  < 25.0 mg/L (30.0-100.0)  L  18  13:33    


 


Acetaminophen  < 10.0 ug/mL (10.0-30.0)  L  18  13:33    


 


Ethyl Alcohol  < 10 mg/dL (0-10)   18  13:33    


 


RPR  NONREACTIVE  (NONREACTIVE)   18  13:33    














- Physical Exam


Vitals and I&O: 


 Vital Signs











Temp  98.2 F   18 14:00


 


Pulse  62   18 14:00


 


Resp  20   18 14:00


 


BP  135/67   18 14:00


 


Pulse Ox  98   18 14:00








 Intake & Output











 18





 06:59 18:59 06:59


 


Intake Total 120 800 


 


Balance 120 800 


 


Intake:   


 


  Oral 120 800 


 


Other:   


 


  # Voids 3 3 


 


  # Bowel Movements  1 











Active Medications: 


Current Medications





Acetaminophen (Tylenol)  650 mg PO Q4HR PRN


   PRN Reason: Mild Pain / Temp above 100


   Stop: 10/27/18 16:04


Benztropine Mesylate (Cogentin)  1 mg PO BID PRN


   PRN Reason: extrapyramidal side effects


   Stop: 10/27/18 16:04


Divalproex Sodium (Depakote Er)  500 mg PO Q12HR DANYA; Protocol


   Stop: 10/27/18 22:59


   Last Admin: 18 20:41 Dose:  500 mg


Docusate Sodium (Colace)  100 mg PO BID DANYA


   Stop: 10/27/18 16:59


   Last Admin: 18 16:25 Dose:  100 mg


Levothyroxine Sodium (Synthroid)  0.075 mg PO QDAC DANYA


   Stop: 10/28/18 07:29


   Last Admin: 18 06:32 Dose:  0.075 mg


Lorazepam (Ativan)  0.5 mg PO Q6HR PRN; Protocol


   PRN Reason: Anxiety


   Stop: 10/27/18 20:03


   Last Admin: 18 20:41 Dose:  0.5 mg


Metoprolol Succinate (Toprol Xl)  50 mg PO DAILY DANYA


   Stop: 10/28/18 08:59


   Last Admin: 18 08:11 Dose:  50 mg


Olanzapine (Zyprexa)  20 mg PO HS DANYA; Protocol


   Stop: 10/27/18 22:59


   Last Admin: 18 20:41 Dose:  20 mg








General: demented


HEENT: NC/AT, PERRLA, EOMI, anicteric sclerae, throat clear


Neck: Supple, No JVD, No thyromegaly, +2 carotid pulse wo bruit


Lungs: CTAB


Cardiovascular: RRR, Normal S1, Normal S2, without murmur


Abdomen: soft, non-tender, non-distended


Extremities: clear


Neurological: no change





- Procedures


Procedures: 


 Procedures











Procedure Code Date


 


GROUP PSYCHOTHERAPY 68456 16


 


GROUP PSYCHOTHERAPY [id number] 16


 


GROUP PSYCHOTHERAPY 68492 16


 


GROUP PSYCHOTHERAPY GZHZZZZ 16


 


INDIVID PSYCHOTHERAP NEC 94.39 08


 


OTHER GROUP THERAPY 94.44 08/27/15


 


RECREATIONAL THERAPY 93.81 13














Internal Medicine Assmt/Plan





- Assessment


Assessment: 





1.HTN.


2.HYPOTHYROIISIM.


3.DEMENTIA.


4.PSYCHOSIS





- Plan


Plan: 





CONTINUE ON CURRENT MEDICATION AND DIET.





Nutritional Asmnt/Malnutr-PDOC





- Dietary Evaluation


Malnutrition Findings (Please click <Entered> for more info): 








Nutritional Asmnt/Malnutrition                             Start:  18 10:

05


Text:                                                      Status: Complete    

  


Freq:                                                                          

  


Protocol:                                                                      

  


 Document     18 10:05  ELE  (Rec: 18 10:25  ELE COLEMAN-

FNS1)


 Nutritional Asmnt/Malnutrition


     Patient General Information


      Nutritional Screening                      Low Risk


      Diagnosis                                  Psychosis


      Pertinent Medical Hx/Surgical Hx           HTN, hypothyroidism, dementia,


                                                 psychosis


      Subjective Information                     Patient seen in bed at time of


                                                 RD visit. Per nursing notes


                                                 patient remains confused.


                                                 Tolerating current diet order


                                                 without difficulty.


      Current Diet Order/ Nutrition Support      Regular


      Patient / S.O                              Not Indicated


      Pertinent Medications                      colace, synthroid


      Pertinent Labs                             () Na 130, albumin 3.8


     Nutritional Hx/Data


      Height                                     1.8 m


      Height (Calculated Centimeters)            180.3


      Current Weight (lbs)                       75.75 kg


      Weight (Calculated Kilograms)              75.7


      Weight (Calculated Grams)                  07442.9


      Ideal Body Weight                          172


      % Ideal Body Weight                        98


      Body Mass Index (BMI)                      23.3


      Recent Weight Change                       No


      Weight Status                              Approriate


     GI Symptoms


      GI Symptoms                                None


      Last BM                                    9/2 x 1


      Difficult in:                              None


      Food Allergies                             No


      Cultural/Ethnic/Mandaen Belief           none indicated


      Usual diet at home                         unknown


      Skin Integrity/Comment:                    Thom 19, intact


      Current %PO                                Good (%)


     Estimated Nutritional Goals


      BEE in Kcals:                              Using Current wt


      Calories/Kcals/Kg                          25-30 kcal/kg (using CBW 75.


                                                 9kg)


      Kcals Calculated                           ~2048-5900 kcal/day


      Protein:                                   Using Current wt


      Protein g/k gm/kg


      Protein Calculated                         ~75gm/day


      Fluid: ml                                  ~1487-3989 ml/day (1 ml/kcal)


     Nutritional Problem


      1. Problem


       Problem                                   Altered nutrition related lab


                                                 values related to


       Etiology                                  electrolyte imbalance aeb


       Signs/Symptoms:                           Na 130


     Intervention/Recommendation


      Comments                                   1. Continue regular diet as


                                                 tolerated by patient.


                                                 2. Consider rechecking Na


                                                 level, if continues to be


                                                 hyponatremic, consider fluid


                                                 restriction


     Expected Outcomes/Goals


      Expected Outcomes/Goals                    Oral intake >75% of mals,


                                                 weight stable, nutrition


                                                 related labs WNL

## 2018-09-06 RX ADMIN — LEVOTHYROXINE SODIUM SCH MG: 75 TABLET ORAL at 06:38

## 2018-09-06 RX ADMIN — Medication SCH TAB: at 08:43

## 2018-09-06 NOTE — PROGRESS NOTES
DATE:  09/05/2018



SUBJECTIVE:  Staff was spoken to.  The patient is interviewed.  Mood is noted to

be irritable.  Affect is constricted.  Insight and judgment at this time are

noted to be still impaired.  The patient has been screaming and yelling and has

been pacing on the unit.  The patient has to be given a dose of Ativan to calm

him down.  The patient is currently on Zyprexa and Depakote and has been able to

tolerate the medication.



ASSESSMENT:  The patient's lab work has been reviewed and sodium is noted to be

low at 130, glucose is noted to be 135, cholesterol noted to be within normal

limits.



ASSESSMENT:  The patient is still psychotic.



PLAN:  To continue the patient with the current medications and followup.





DD: 09/05/2018 18:51

DT: 09/06/2018 02:57

JOB# [id number]  [id number]

## 2018-09-06 NOTE — PROGRESS NOTES
DATE:  09/06/2018



SUBJECTIVE:  Staff was spoken to.  The patient is interviewed.  Mood is noted to

be irritable.  The patient is talking to self and pacing most of the time on the

unit.  The patient is reluctant to take any more medication.  The patient is

stating that he has been on too much of medication and has been sleeping too

long and the patient is currently on Depakote 500 mg twice a day and Zyprexa 20

mg.  The patient's glucose is noted to be 135 and the patient's cholesterol is

noted to be 158.  The patient's hemoglobin A1c is noted to be 6.3.  Hence it is

decided to gradually titrate the dose down on the olanzapine and then consider

the patient to be on Abilify to see if it is going to be making any change in

his psychosis.



PLAN:  Plan to start the patient on Abilify 5 mg and decrease the dose on the

Zyprexa to 15 mg and follow the patient with the supportive therapy.  Please

note that the patient is not ready to be discharged in view of his acute

psychosis.





DD: 09/06/2018 11:29

DT: 09/06/2018 13:57

JOB# [id number]  [id number]

## 2018-09-06 NOTE — INTERNAL MEDICINE PROG NOTE
Internal Medicine Subjective





- Subjective


Service Date: 18


Patient seen and examined:: without staff


Patient is:: awake, verbal, talking, confused


Per staff patient has:: no adverse event





Internal Medicine Objective





- Results


Result Diagrams: 


 18 13:33





 18 13:33


Recent Labs: 


 Laboratory Last Values











WBC  9.4 Th/cmm (4.8-10.8)   18  13:33    


 


RBC  4.78 Mil/cmm (3.80-5.80)   18  13:33    


 


Hgb  14.2 gm/dL (12-16)   18  13:33    


 


Hct  42.5 % (41.0-60)   18  13:33    


 


MCV  89.0 fl (80-99)   18  13:33    


 


MCH  29.6 pg (27.0-31.0)   18  13:33    


 


MCHC Differential  33.3 pg (28.0-36.0)   18  13:33    


 


RDW  13.5 % (11.5-20.0)   18  13:33    


 


Plt Count  245 Th/cmm (150-400)   18  13:33    


 


MPV  7.6 fl  18  13:33    


 


Neutrophils %  79.2 % (40.0-80.0)   18  13:33    


 


Lymphocytes %  12.9 % (20.0-50.0)  L  18  13:33    


 


Monocytes %  5.3 % (2.0-10.0)   18  13:33    


 


Eosinophils %  0.7 % (0.0-5.0)   18  13:33    


 


Basophils %  1.9 % (0.0-2.0)   18  13:33    


 


Sodium  130 mEq/L (136-145)  L  18  13:33    


 


Potassium  4.3 mEq/L (3.5-5.1)   18  13:33    


 


Chloride  99 mEq/L ()   18  13:33    


 


Carbon Dioxide  25.7 mEq/L (21.0-31.0)   18  13:33    


 


Anion Gap  9.6  (7.0-16.0)   18  13:33    


 


BUN  17 mg/dL (7-25)   18  13:33    


 


Creatinine  1.0 mg/dL (0.7-1.3)   18  13:33    


 


Est GFR ( Amer)  TNP   18  13:33    


 


Est GFR (Non-Af Amer)  TNP   18  13:33    


 


BUN/Creatinine Ratio  17.0   18  13:33    


 


Glucose  135 mg/dL ()  H  18  13:33    


 


Hemoglobin A1c %  6.3 % (4.0-6.0)  H  18  13:33    


 


Calcium  8.8 mg/dL (8.6-10.3)   18  13:33    


 


Total Bilirubin  0.5 mg/dL (0.3-1.0)   18  13:33    


 


AST  31 U/L (13-39)   18  13:33    


 


ALT  17 U/L (7-52)   18  13:33    


 


Alkaline Phosphatase  35 U/L ()   18  13:33    


 


Troponin I  0.01 ng/mL (0.01-0.05)   18  13:33    


 


Total Protein  6.1 gm/dL (6.0-8.3)   18  13:33    


 


Albumin  3.8 gm/dL (4.2-5.5)  L  18  13:33    


 


Globulin  2.3 gm/dL  18  13:33    


 


Albumin/Globulin Ratio  1.7  (1.0-1.8)   18  13:33    


 


Triglycerides  98 mg/dL (<150)   18  13:33    


 


Cholesterol  158 mg/dL (<200)   18  13:33    


 


LDL Cholesterol Direct  79 mg/dL ()   18  13:33    


 


HDL Cholesterol  61 mg/dL (23-92)   18  13:33    


 


TSH  0.87 uIU/ml (0.34-5.60)   18  13:33    


 


Salicylates  < 25.0 mg/L (30.0-100.0)  L  18  13:33    


 


Acetaminophen  < 10.0 ug/mL (10.0-30.0)  L  18  13:33    


 


Ethyl Alcohol  < 10 mg/dL (0-10)   18  13:33    


 


RPR  NONREACTIVE  (NONREACTIVE)   18  13:33    














- Physical Exam


Vitals and I&O: 


 Vital Signs











Temp  98.0 F   18 14:00


 


Pulse  57   18 14:00


 


Resp  20   18 14:00


 


BP  148/61   18 14:00


 


Pulse Ox  97   18 14:00








 Intake & Output











 18





 06:59 18:59 06:59


 


Intake Total  1200 


 


Output Total  1 


 


Balance  1199 


 


Intake:   


 


  Oral  1200 


 


Output:   


 


  Stool  1 











Active Medications: 


Current Medications





Acetaminophen (Tylenol)  650 mg PO Q4HR PRN


   PRN Reason: Mild Pain / Temp above 100


   Stop: 10/27/18 16:04


Aripiprazole (Abilify)  5 mg PO DAILY DANYA; Protocol


   Stop: 18 08:59


Benztropine Mesylate (Cogentin)  1 mg PO BID PRN


   PRN Reason: extrapyramidal side effects


   Stop: 10/27/18 16:04


Divalproex Sodium (Depakote Er)  500 mg PO Q12HR DANYA; Protocol


   Stop: 10/27/18 22:59


   Last Admin: 18 08:43 Dose:  500 mg


Docusate Sodium (Colace)  100 mg PO BID DANYA


   Stop: 10/27/18 16:59


   Last Admin: 18 16:50 Dose:  100 mg


Levothyroxine Sodium (Synthroid)  0.075 mg PO QDAC DANYA


   Stop: 10/28/18 07:29


   Last Admin: 18 06:38 Dose:  0.075 mg


Lorazepam (Ativan)  0.5 mg PO Q6HR PRN; Protocol


   PRN Reason: Anxiety


   Stop: 10/27/18 20:03


   Last Admin: 18 20:41 Dose:  0.5 mg


Metoprolol Succinate (Toprol Xl)  50 mg PO DAILY DANYA


   Stop: 10/28/18 08:59


   Last Admin: 18 08:43 Dose:  50 mg


Olanzapine (Zyprexa)  15 mg PO HS DANYA; Protocol


   Stop: 18 20:59








General: demented


HEENT: NC/AT, PERRLA, EOMI, anicteric sclerae, throat clear


Neck: Supple, No JVD, No thyromegaly, +2 carotid pulse wo bruit


Lungs: CTAB


Cardiovascular: RRR, Normal S1, Normal S2, without murmur


Abdomen: soft, non-tender, non-distended


Extremities: clear


Neurological: no change





- Procedures


Procedures: 


 Procedures











Procedure Code Date


 


GROUP PSYCHOTHERAPY 92542 16


 


GROUP PSYCHOTHERAPY GZHZZZZ 16


 


GROUP PSYCHOTHERAPY 22804 16


 


GROUP PSYCHOTHERAPY GZHZZZZ 16


 


INDIVID PSYCHOTHERAP NEC 94.39 08


 


OTHER GROUP THERAPY 94.44 08/27/15


 


RECREATIONAL THERAPY 93.81 13














Internal Medicine Assmt/Plan





- Assessment


Assessment: 





1.HTN.


2.HYPOTHYROIISIM.


3.DEMENTIA.


4.PSYCHOSIS





- Plan


Plan: 





CONTINUE ON CURRENT MEDICATION AND DIET.





Nutritional Asmnt/Malnutr-PDOC





- Dietary Evaluation


Malnutrition Findings (Please click <Entered> for more info): 








Nutritional Asmnt/Malnutrition                             Start:  18 10:

05


Text:                                                      Status: Complete    

  


Freq:                                                                          

  


Protocol:                                                                      

  


 Document     18 10:05  ELE  (Rec: 18 10:25  ELE COLEMAN-

FNS1)


 Nutritional Asmnt/Malnutrition


     Patient General Information


      Nutritional Screening                      Low Risk


      Diagnosis                                  Psychosis


      Pertinent Medical Hx/Surgical Hx           HTN, hypothyroidism, dementia,


                                                 psychosis


      Subjective Information                     Patient seen in bed at time of


                                                 RD visit. Per nursing notes


                                                 patient remains confused.


                                                 Tolerating current diet order


                                                 without difficulty.


      Current Diet Order/ Nutrition Support      Regular


      Patient / S.O                              Not Indicated


      Pertinent Medications                      colace, synthroid


      Pertinent Labs                             () Na 130, albumin 3.8


     Nutritional Hx/Data


      Height                                     1.8 m


      Height (Calculated Centimeters)            180.3


      Current Weight (lbs)                       75.75 kg


      Weight (Calculated Kilograms)              75.7


      Weight (Calculated Grams)                  06454.9


      Ideal Body Weight                          172


      % Ideal Body Weight                        98


      Body Mass Index (BMI)                      23.3


      Recent Weight Change                       No


      Weight Status                              Approriate


     GI Symptoms


      GI Symptoms                                None


      Last BM                                    9/2 x 1


      Difficult in:                              None


      Food Allergies                             No


      Cultural/Ethnic/Yazidi Belief           none indicated


      Usual diet at home                         unknown


      Skin Integrity/Comment:                    Thom 19, intact


      Current %PO                                Good (%)


     Estimated Nutritional Goals


      BEE in Kcals:                              Using Current wt


      Calories/Kcals/Kg                          25-30 kcal/kg (using CBW 75.


                                                 9kg)


      Kcals Calculated                           ~2149-5231 kcal/day


      Protein:                                   Using Current wt


      Protein g/k gm/kg


      Protein Calculated                         ~75gm/day


      Fluid: ml                                  ~8007-4670 ml/day (1 ml/kcal)


     Nutritional Problem


      1. Problem


       Problem                                   Altered nutrition related lab


                                                 values related to


       Etiology                                  electrolyte imbalance aeb


       Signs/Symptoms:                           Na 130


     Intervention/Recommendation


      Comments                                   1. Continue regular diet as


                                                 tolerated by patient.


                                                 2. Consider rechecking Na


                                                 level, if continues to be


                                                 hyponatremic, consider fluid


                                                 restriction


     Expected Outcomes/Goals


      Expected Outcomes/Goals                    Oral intake >75% of mals,


                                                 weight stable, nutrition


                                                 related labs WNL

## 2018-09-07 RX ADMIN — Medication SCH TAB: at 08:44

## 2018-09-07 RX ADMIN — LEVOTHYROXINE SODIUM SCH MG: 75 TABLET ORAL at 06:45

## 2018-09-07 NOTE — INTERNAL MEDICINE PROG NOTE
Internal Medicine Subjective





- Subjective


Service Date: 18


Patient seen and examined:: with staff


Patient is:: awake, verbal, talking, confused


Per staff patient has:: no adverse event





Internal Medicine Objective





- Results


Result Diagrams: 


 18 13:33





 18 13:33


Recent Labs: 


 Laboratory Last Values











WBC  9.4 Th/cmm (4.8-10.8)   18  13:33    


 


RBC  4.78 Mil/cmm (3.80-5.80)   18  13:33    


 


Hgb  14.2 gm/dL (12-16)   18  13:33    


 


Hct  42.5 % (41.0-60)   18  13:33    


 


MCV  89.0 fl (80-99)   18  13:33    


 


MCH  29.6 pg (27.0-31.0)   18  13:33    


 


MCHC Differential  33.3 pg (28.0-36.0)   18  13:33    


 


RDW  13.5 % (11.5-20.0)   18  13:33    


 


Plt Count  245 Th/cmm (150-400)   18  13:33    


 


MPV  7.6 fl  18  13:33    


 


Neutrophils %  79.2 % (40.0-80.0)   18  13:33    


 


Lymphocytes %  12.9 % (20.0-50.0)  L  18  13:33    


 


Monocytes %  5.3 % (2.0-10.0)   18  13:33    


 


Eosinophils %  0.7 % (0.0-5.0)   18  13:33    


 


Basophils %  1.9 % (0.0-2.0)   18  13:33    


 


Sodium  130 mEq/L (136-145)  L  18  13:33    


 


Potassium  4.3 mEq/L (3.5-5.1)   18  13:33    


 


Chloride  99 mEq/L ()   18  13:33    


 


Carbon Dioxide  25.7 mEq/L (21.0-31.0)   18  13:33    


 


Anion Gap  9.6  (7.0-16.0)   18  13:33    


 


BUN  17 mg/dL (7-25)   18  13:33    


 


Creatinine  1.0 mg/dL (0.7-1.3)   18  13:33    


 


Est GFR ( Amer)  TNP   18  13:33    


 


Est GFR (Non-Af Amer)  TNP   18  13:33    


 


BUN/Creatinine Ratio  17.0   18  13:33    


 


Glucose  135 mg/dL ()  H  18  13:33    


 


POC Glucose  74 MG/DL (70 - 105)   18  06:42    


 


Hemoglobin A1c %  6.3 % (4.0-6.0)  H  18  13:33    


 


Calcium  8.8 mg/dL (8.6-10.3)   18  13:33    


 


Total Bilirubin  0.5 mg/dL (0.3-1.0)   18  13:33    


 


AST  31 U/L (13-39)   18  13:33    


 


ALT  17 U/L (7-52)   18  13:33    


 


Alkaline Phosphatase  35 U/L ()   18  13:33    


 


Troponin I  0.01 ng/mL (0.01-0.05)   18  13:33    


 


Total Protein  6.1 gm/dL (6.0-8.3)   18  13:33    


 


Albumin  3.8 gm/dL (4.2-5.5)  L  18  13:33    


 


Globulin  2.3 gm/dL  18  13:33    


 


Albumin/Globulin Ratio  1.7  (1.0-1.8)   18  13:33    


 


Triglycerides  98 mg/dL (<150)   18  13:33    


 


Cholesterol  158 mg/dL (<200)   18  13:33    


 


LDL Cholesterol Direct  79 mg/dL ()   18  13:33    


 


HDL Cholesterol  61 mg/dL (23-92)   18  13:33    


 


TSH  0.87 uIU/ml (0.34-5.60)   18  13:33    


 


Salicylates  < 25.0 mg/L (30.0-100.0)  L  18  13:33    


 


Acetaminophen  < 10.0 ug/mL (10.0-30.0)  L  18  13:33    


 


Ethyl Alcohol  < 10 mg/dL (0-10)   18  13:33    


 


RPR  NONREACTIVE  (NONREACTIVE)   18  13:33    














- Physical Exam


Vitals and I&O: 


 Vital Signs











Temp  98.1 F   18 14:37


 


Pulse  56   18 14:37


 


Resp  20   18 14:37


 


BP  150/69   18 14:37


 


Pulse Ox  97   18 14:37








 Intake & Output











 18





 06:59 18:59 06:59


 


Intake Total 240 1680 


 


Balance 240 1680 


 


Intake:   


 


  Oral 240 1680 


 


Other:   


 


  # Voids 1 2 


 


  # Bowel Movements  1 











Active Medications: 


Current Medications





Acetaminophen (Tylenol)  650 mg PO Q4HR PRN


   PRN Reason: Mild Pain / Temp above 100


   Stop: 10/27/18 16:04


Aripiprazole (Abilify)  5 mg PO DAILY [facility]; Protocol


   Stop: 18 08:59


   Last Admin: 18 08:43 Dose:  5 mg


Benztropine Mesylate (Cogentin)  1 mg PO BID PRN


   PRN Reason: extrapyramidal side effects


   Stop: 10/27/18 16:04


Divalproex Sodium (Depakote Er)  500 mg PO Q12HR [facility]; Protocol


   Stop: 10/27/18 22:59


   Last Admin: 18 08:44 Dose:  500 mg


Docusate Sodium (Colace)  100 mg PO BID [facility]


   Stop: 10/27/18 16:59


   Last Admin: 18 17:10 Dose:  100 mg


Levothyroxine Sodium (Synthroid)  0.075 mg PO QDAC [facility]


   Stop: 10/28/18 07:29


   Last Admin: 18 06:45 Dose:  0.075 mg


Lorazepam (Ativan)  0.5 mg PO Q6HR PRN; Protocol


   PRN Reason: Anxiety


   Stop: 10/27/18 20:03


   Last Admin: 18 00:37 Dose:  0.5 mg


Metoprolol Succinate (Toprol Xl)  50 mg PO DAILY [facility]


   Stop: 10/28/18 08:59


   Last Admin: 18 08:42 Dose:  50 mg


Olanzapine (Zyprexa)  15 mg PO HS [facility]; Protocol


   Stop: 18 20:59


   Last Admin: 18 20:46 Dose:  15 mg








General: demented


HEENT: NC/AT, PERRLA, EOMI, anicteric sclerae, throat clear


Neck: Supple, No JVD, No thyromegaly, +2 carotid pulse wo bruit


Lungs: CTAB


Cardiovascular: RRR, Normal S1, Normal S2, without murmur


Abdomen: soft, non-tender, non-distended


Extremities: clear


Neurological: no change





- Procedures


Procedures: 


 Procedures











Procedure Code Date


 


GROUP PSYCHOTHERAPY 75344 16


 


GROUP PSYCHOTHERAPY [id number] 16


 


GROUP PSYCHOTHERAPY 34049 16


 


GROUP PSYCHOTHERAPY GZHZZZZ 16


 


INDIVID PSYCHOTHERAP NEC 94.39 08


 


OTHER GROUP THERAPY 94.44 08/27/15


 


RECREATIONAL THERAPY 93.81 13














Internal Medicine Assmt/Plan





- Assessment


Assessment: 





1.HTN.


2.HYPOTHYROIISIM.


3.DEMENTIA.


4.PSYCHOSIS





- Plan


Plan: 





CONTINUE ON CURRENT MEDICATION AND DIET.





Nutritional Asmnt/Malnutr-PDOC





- Dietary Evaluation


Malnutrition Findings (Please click <Entered> for more info): 








Nutritional Asmnt/Malnutrition                             Start:  18 10:

05


Text:                                                      Status: Complete    

  


Freq:                                                                          

  


Protocol:                                                                      

  


 Document     18 10:05  ELE  (Rec: 18 10:25  ELE COLEMAN-

FNS1)


 Nutritional Asmnt/Malnutrition


     Patient General Information


      Nutritional Screening                      Low Risk


      Diagnosis                                  Psychosis


      Pertinent Medical Hx/Surgical Hx           HTN, hypothyroidism, dementia,


                                                 psychosis


      Subjective Information                     Patient seen in bed at time of


                                                 RD visit. Per nursing notes


                                                 patient remains confused.


                                                 Tolerating current diet order


                                                 without difficulty.


      Current Diet Order/ Nutrition Support      Regular


      Patient / S.O                              Not Indicated


      Pertinent Medications                      colace, synthroid


      Pertinent Labs                             () Na 130, albumin 3.8


     Nutritional Hx/Data


      Height                                     1.8 m


      Height (Calculated Centimeters)            180.3


      Current Weight (lbs)                       75.75 kg


      Weight (Calculated Kilograms)              75.7


      Weight (Calculated Grams)                  97743.9


      Ideal Body Weight                          172


      % Ideal Body Weight                        98


      Body Mass Index (BMI)                      23.3


      Recent Weight Change                       No


      Weight Status                              Approriate


     GI Symptoms


      GI Symptoms                                None


      Last BM                                    9/2 x 1


      Difficult in:                              None


      Food Allergies                             No


      Cultural/Ethnic/Voodoo Belief           none indicated


      Usual diet at home                         unknown


      Skin Integrity/Comment:                    Thom 19, intact


      Current %PO                                Good (%)


     Estimated Nutritional Goals


      BEE in Kcals:                              Using Current wt


      Calories/Kcals/Kg                          25-30 kcal/kg (using CBW 75.


                                                 9kg)


      Kcals Calculated                           ~4049-5125 kcal/day


      Protein:                                   Using Current wt


      Protein g/k gm/kg


      Protein Calculated                         ~75gm/day


      Fluid: ml                                  ~7594-7964 ml/day (1 ml/kcal)


     Nutritional Problem


      1. Problem


       Problem                                   Altered nutrition related lab


                                                 values related to


       Etiology                                  electrolyte imbalance aeb


       Signs/Symptoms:                           Na 130


     Intervention/Recommendation


      Comments                                   1. Continue regular diet as


                                                 tolerated by patient.


                                                 2. Consider rechecking Na


                                                 level, if continues to be


                                                 hyponatremic, consider fluid


                                                 restriction


     Expected Outcomes/Goals


      Expected Outcomes/Goals                    Oral intake >75% of mals,


                                                 weight stable, nutrition


                                                 related labs WNL

## 2018-09-08 RX ADMIN — LEVOTHYROXINE SODIUM SCH MG: 75 TABLET ORAL at 06:34

## 2018-09-08 RX ADMIN — Medication SCH TAB: at 08:50

## 2018-09-08 NOTE — PROGRESS NOTES
DATE:  09/07/2018



SUBJECTIVE:  Staff was spoken to.  The patient is interviewed.  Mood is noted to

be irritable.  Affect is constricted.  Coping skills are noted to be poor. 

Sleep and appetite are also noted to be very poor.  The patient has been having

difficult time to cope with the stress.  The patient has been pacing most of the

time on the unit.



ASSESSMENT:  The patient is still psychotic.



PLAN:  To continue the patient with Depakote and Zyprexa and follow him up.





DD: 09/07/2018 18:21

DT: 09/08/2018 02:02

JOB# [id number]  [id number]

## 2018-09-08 NOTE — PROGRESS NOTES
DATE:  09/08/2018



SUBJECTIVE:  Staff was spoken to.  The patient is interviewed.  Mood is noted to

be irritable.  Affect is constricted.  Insight and judgment at this time are

noted to be still impaired.  Impulse control is noted to be poor.  Coping skills

are also noted to be very poor.  The patient has been pacing most of the time. 

The patient has been very paranoid and has been getting easily irritable and

angry, continues to be responding to internal stimuli.  The patient has been

placed on the Abilify and has been able to tolerate.  Plan to increase the dose

on the Abilify to 10 mg and then decrease the dose on the Zyprexa to 10 mg and

follow the patient with the supportive therapy.  Please note that the patient is

not ready to be discharged to a lower level of care in view of his acute

psychosis.





DD: 09/08/2018 13:34

DT: 09/08/2018 23:23

JOB# [id number]  [id number]

## 2018-09-08 NOTE — INTERNAL MEDICINE PROG NOTE
Internal Medicine Subjective





- Subjective


Service Date: 18


Patient seen and examined:: without staff


Patient is:: awake, verbal, talking, confused


Per staff patient has:: no adverse event





Internal Medicine Objective





- Results


Result Diagrams: 


 18 13:33





 18 13:33


Recent Labs: 


 Laboratory Last Values











WBC  9.4 Th/cmm (4.8-10.8)   18  13:33    


 


RBC  4.78 Mil/cmm (3.80-5.80)   18  13:33    


 


Hgb  14.2 gm/dL (12-16)   18  13:33    


 


Hct  42.5 % (41.0-60)   18  13:33    


 


MCV  89.0 fl (80-99)   18  13:33    


 


MCH  29.6 pg (27.0-31.0)   18  13:33    


 


MCHC Differential  33.3 pg (28.0-36.0)   18  13:33    


 


RDW  13.5 % (11.5-20.0)   18  13:33    


 


Plt Count  245 Th/cmm (150-400)   18  13:33    


 


MPV  7.6 fl  18  13:33    


 


Neutrophils %  79.2 % (40.0-80.0)   18  13:33    


 


Lymphocytes %  12.9 % (20.0-50.0)  L  18  13:33    


 


Monocytes %  5.3 % (2.0-10.0)   18  13:33    


 


Eosinophils %  0.7 % (0.0-5.0)   18  13:33    


 


Basophils %  1.9 % (0.0-2.0)   18  13:33    


 


Sodium  130 mEq/L (136-145)  L  18  13:33    


 


Potassium  4.3 mEq/L (3.5-5.1)   18  13:33    


 


Chloride  99 mEq/L ()   18  13:33    


 


Carbon Dioxide  25.7 mEq/L (21.0-31.0)   18  13:33    


 


Anion Gap  9.6  (7.0-16.0)   18  13:33    


 


BUN  17 mg/dL (7-25)   18  13:33    


 


Creatinine  1.0 mg/dL (0.7-1.3)   18  13:33    


 


Est GFR ( Amer)  TNP   18  13:33    


 


Est GFR (Non-Af Amer)  TNP   18  13:33    


 


BUN/Creatinine Ratio  17.0   18  13:33    


 


Glucose  135 mg/dL ()  H  18  13:33    


 


POC Glucose  74 MG/DL (70 - 105)   18  06:42    


 


Hemoglobin A1c %  6.3 % (4.0-6.0)  H  18  13:33    


 


Calcium  8.8 mg/dL (8.6-10.3)   18  13:33    


 


Total Bilirubin  0.5 mg/dL (0.3-1.0)   18  13:33    


 


AST  31 U/L (13-39)   18  13:33    


 


ALT  17 U/L (7-52)   18  13:33    


 


Alkaline Phosphatase  35 U/L ()   18  13:33    


 


Troponin I  0.01 ng/mL (0.01-0.05)   18  13:33    


 


Total Protein  6.1 gm/dL (6.0-8.3)   18  13:33    


 


Albumin  3.8 gm/dL (4.2-5.5)  L  18  13:33    


 


Globulin  2.3 gm/dL  18  13:33    


 


Albumin/Globulin Ratio  1.7  (1.0-1.8)   18  13:33    


 


Triglycerides  98 mg/dL (<150)   18  13:33    


 


Cholesterol  158 mg/dL (<200)   18  13:33    


 


LDL Cholesterol Direct  79 mg/dL ()   18  13:33    


 


HDL Cholesterol  61 mg/dL (23-92)   18  13:33    


 


TSH  0.87 uIU/ml (0.34-5.60)   18  13:33    


 


Salicylates  < 25.0 mg/L (30.0-100.0)  L  18  13:33    


 


Acetaminophen  < 10.0 ug/mL (10.0-30.0)  L  18  13:33    


 


Ethyl Alcohol  < 10 mg/dL (0-10)   18  13:33    


 


RPR  NONREACTIVE  (NONREACTIVE)   18  13:33    














- Physical Exam


Vitals and I&O: 


 Vital Signs











Temp  97.7 F   18 14:00


 


Pulse  58   18 14:00


 


Resp  18   18 14:00


 


BP  156/81   18 14:00


 


Pulse Ox  96   18 14:00








 Intake & Output











 18





 18:59 06:59 18:59


 


Intake Total 1680  


 


Balance 1680  


 


Weight (lbs)  75.75 kg 


 


Intake:   


 


  Oral 1680  


 


Other:   


 


  # Voids 2 3 


 


  # Bowel Movements 1 0 


 


  Weight Source  Bedscale 











Active Medications: 


Current Medications





Acetaminophen (Tylenol)  650 mg PO Q4HR PRN


   PRN Reason: Mild Pain / Temp above 100


   Stop: 10/27/18 16:04


Aripiprazole (Abilify)  10 mg PO DAILY [facility]; Protocol


   Stop: 18 08:59


Benztropine Mesylate (Cogentin)  1 mg PO BID PRN


   PRN Reason: extrapyramidal side effects


   Stop: 10/27/18 16:04


Divalproex Sodium (Depakote Er)  500 mg PO Q12HR [facility]; Protocol


   Stop: 10/27/18 22:59


   Last Admin: 18 08:50 Dose:  500 mg


Docusate Sodium (Colace)  100 mg PO BID [facility]


   Stop: 10/27/18 16:59


   Last Admin: 18 08:50 Dose:  100 mg


Levothyroxine Sodium (Synthroid)  0.075 mg PO QDAC [facility]


   Stop: 10/28/18 07:29


   Last Admin: 18 06:34 Dose:  0.075 mg


Lorazepam (Ativan)  0.5 mg PO Q6HR PRN; Protocol


   PRN Reason: Anxiety


   Stop: 10/27/18 20:03


   Last Admin: 18 00:37 Dose:  0.5 mg


Metoprolol Succinate (Toprol Xl)  50 mg PO DAILY [facility]


   Stop: 10/28/18 08:59


   Last Admin: 18 08:50 Dose:  50 mg


Olanzapine (Zyprexa)  10 mg PO HS [facility]; Protocol


   Stop: 18 20:59








General: demented


HEENT: NC/AT, PERRLA, EOMI, anicteric sclerae, throat clear


Neck: Supple, No JVD, No thyromegaly, +2 carotid pulse wo bruit


Lungs: CTAB


Cardiovascular: RRR, Normal S1, Normal S2, without murmur


Abdomen: soft, non-tender, non-distended


Extremities: clear


Neurological: no change





- Procedures


Procedures: 


 Procedures











Procedure Code Date


 


GROUP PSYCHOTHERAPY 83623 16


 


GROUP PSYCHOTHERAPY GZHZZZZ 16


 


GROUP PSYCHOTHERAPY [id number] 16


 


GROUP PSYCHOTHERAPY GZHZZZZ 16


 


INDIVID PSYCHOTHERAP NEC 94.39 08


 


OTHER GROUP THERAPY 94.44 08/27/15


 


RECREATIONAL THERAPY 93.81 13














Internal Medicine Assmt/Plan





- Assessment


Assessment: 





1.HTN.


2.HYPOTHYROIISIM.


3.DEMENTIA.


4.PSYCHOSIS





- Plan


Plan: 





CONTINUE ON CURRENT MEDICATION AND DIET.





Nutritional Asmnt/Malnutr-PDOC





- Dietary Evaluation


Malnutrition Findings (Please click <Entered> for more info): 








Nutritional Asmnt/Malnutrition                             Start:  18 10:

05


Text:                                                      Status: Complete    

  


Freq:                                                                          

  


Protocol:                                                                      

  


 Document     18 10:05  ELE  (Rec: 18 10:25  ELE  [place]-

FNS1)


 Nutritional Asmnt/Malnutrition


     Patient General Information


      Nutritional Screening                      Low Risk


      Diagnosis                                  Psychosis


      Pertinent Medical Hx/Surgical Hx           HTN, hypothyroidism, dementia,


                                                 psychosis


      Subjective Information                     Patient seen in bed at time of


                                                 RD visit. Per nursing notes


                                                 patient remains confused.


                                                 Tolerating current diet order


                                                 without difficulty.


      Current Diet Order/ Nutrition Support      Regular


      Patient / S.O                              Not Indicated


      Pertinent Medications                      colace, synthroid


      Pertinent Labs                             () Na 130, albumin 3.8


     Nutritional Hx/Data


      Height                                     1.8 m


      Height (Calculated Centimeters)            180.3


      Current Weight (lbs)                       75.75 kg


      Weight (Calculated Kilograms)              75.7


      Weight (Calculated Grams)                  71901.9


      Ideal Body Weight                          172


      % Ideal Body Weight                        98


      Body Mass Index (BMI)                      23.3


      Recent Weight Change                       No


      Weight Status                              Approriate


     GI Symptoms


      GI Symptoms                                None


      Last BM                                    9/2 x 1


      Difficult in:                              None


      Food Allergies                             No


      Cultural/Ethnic/[place] Belief           none indicated


      Usual diet at home                         unknown


      Skin Integrity/Comment:                    Thom 19, intact


      Current %PO                                Good (%)


     Estimated Nutritional Goals


      BEE in Kcals:                              Using Current wt


      Calories/Kcals/Kg                          25-30 kcal/kg (using CBW 75.


                                                 9kg)


      Kcals Calculated                           ~0938-5061 kcal/day


      Protein:                                   Using Current wt


      Protein g/k gm/kg


      Protein Calculated                         ~75gm/day


      Fluid: ml                                  ~6712-4469 ml/day (1 ml/kcal)


     Nutritional Problem


      1. Problem


       Problem                                   Altered nutrition related lab


                                                 values related to


       Etiology                                  electrolyte imbalance aeb


       Signs/Symptoms:                           Na 130


     Intervention/Recommendation


      Comments                                   1. Continue regular diet as


                                                 tolerated by patient.


                                                 2. Consider rechecking Na


                                                 level, if continues to be


                                                 hyponatremic, consider fluid


                                                 restriction


     Expected Outcomes/Goals


      Expected Outcomes/Goals                    Oral intake >75% of mals,


                                                 weight stable, nutrition


                                                 related labs WNL

## 2018-09-09 RX ADMIN — LEVOTHYROXINE SODIUM SCH MG: 75 TABLET ORAL at 06:32

## 2018-09-09 RX ADMIN — Medication SCH TAB: at 08:38

## 2018-09-09 NOTE — INTERNAL MEDICINE PROG NOTE
Internal Medicine Subjective





- Subjective


Service Date: 18


Patient seen and examined:: with staff


Patient is:: awake, verbal, talking, confused


Per staff patient has:: no adverse event





Internal Medicine Objective





- Results


Result Diagrams: 


 18 13:33





 18 13:33


Recent Labs: 


 Laboratory Last Values











WBC  9.4 Th/cmm (4.8-10.8)   18  13:33    


 


RBC  4.78 Mil/cmm (3.80-5.80)   18  13:33    


 


Hgb  14.2 gm/dL (12-16)   18  13:33    


 


Hct  42.5 % (41.0-60)   18  13:33    


 


MCV  89.0 fl (80-99)   18  13:33    


 


MCH  29.6 pg (27.0-31.0)   18  13:33    


 


MCHC Differential  33.3 pg (28.0-36.0)   18  13:33    


 


RDW  13.5 % (11.5-20.0)   18  13:33    


 


Plt Count  245 Th/cmm (150-400)   18  13:33    


 


MPV  7.6 fl  18  13:33    


 


Neutrophils %  79.2 % (40.0-80.0)   18  13:33    


 


Lymphocytes %  12.9 % (20.0-50.0)  L  18  13:33    


 


Monocytes %  5.3 % (2.0-10.0)   18  13:33    


 


Eosinophils %  0.7 % (0.0-5.0)   18  13:33    


 


Basophils %  1.9 % (0.0-2.0)   18  13:33    


 


Sodium  130 mEq/L (136-145)  L  18  13:33    


 


Potassium  4.3 mEq/L (3.5-5.1)   18  13:33    


 


Chloride  99 mEq/L ()   18  13:33    


 


Carbon Dioxide  25.7 mEq/L (21.0-31.0)   18  13:33    


 


Anion Gap  9.6  (7.0-16.0)   18  13:33    


 


BUN  17 mg/dL (7-25)   18  13:33    


 


Creatinine  1.0 mg/dL (0.7-1.3)   18  13:33    


 


Est GFR ( Amer)  TNP   18  13:33    


 


Est GFR (Non-Af Amer)  TNP   18  13:33    


 


BUN/Creatinine Ratio  17.0   18  13:33    


 


Glucose  135 mg/dL ()  H  18  13:33    


 


POC Glucose  74 MG/DL (70 - 105)   18  06:42    


 


Hemoglobin A1c %  6.3 % (4.0-6.0)  H  18  13:33    


 


Calcium  8.8 mg/dL (8.6-10.3)   18  13:33    


 


Total Bilirubin  0.5 mg/dL (0.3-1.0)   18  13:33    


 


AST  31 U/L (13-39)   18  13:33    


 


ALT  17 U/L (7-52)   18  13:33    


 


Alkaline Phosphatase  35 U/L ()   18  13:33    


 


Troponin I  0.01 ng/mL (0.01-0.05)   18  13:33    


 


Total Protein  6.1 gm/dL (6.0-8.3)   18  13:33    


 


Albumin  3.8 gm/dL (4.2-5.5)  L  18  13:33    


 


Globulin  2.3 gm/dL  18  13:33    


 


Albumin/Globulin Ratio  1.7  (1.0-1.8)   18  13:33    


 


Triglycerides  98 mg/dL (<150)   18  13:33    


 


Cholesterol  158 mg/dL (<200)   18  13:33    


 


LDL Cholesterol Direct  79 mg/dL ()   18  13:33    


 


HDL Cholesterol  61 mg/dL (23-92)   18  13:33    


 


TSH  0.87 uIU/ml (0.34-5.60)   18  13:33    


 


Salicylates  < 25.0 mg/L (30.0-100.0)  L  18  13:33    


 


Acetaminophen  < 10.0 ug/mL (10.0-30.0)  L  18  13:33    


 


Ethyl Alcohol  < 10 mg/dL (0-10)   18  13:33    


 


RPR  NONREACTIVE  (NONREACTIVE)   18  13:33    














- Physical Exam


Vitals and I&O: 


 Vital Signs











Temp  97.4 F   18 06:14


 


Pulse  60   18 08:38


 


Resp  18   18 06:14


 


BP  147/64   18 08:38


 


Pulse Ox  97   18 20:00








 Intake & Output











 18





 18:59 06:59 18:59


 


Intake Total 1200 120 


 


Balance 1200 120 


 


Intake:   


 


  Oral 1200 120 


 


Other:   


 


  # Voids  3 


 


  # Bowel Movements 1  











Active Medications: 


Current Medications





Acetaminophen (Tylenol)  650 mg PO Q4HR PRN


   PRN Reason: Mild Pain / Temp above 100


   Stop: 10/27/18 16:04


Aripiprazole (Abilify)  10 mg PO DAILY [facility]; Protocol


   Stop: 18 08:59


   Last Admin: 18 08:38 Dose:  10 mg


Benztropine Mesylate (Cogentin)  1 mg PO BID PRN


   PRN Reason: extrapyramidal side effects


   Stop: 10/27/18 16:04


Divalproex Sodium (Depakote Er)  500 mg PO Q12HR [facility]; Protocol


   Stop: 10/27/18 22:59


   Last Admin: 18 08:38 Dose:  500 mg


Docusate Sodium (Colace)  100 mg PO BID [facility]


   Stop: 10/27/18 16:59


   Last Admin: 18 08:38 Dose:  100 mg


Levothyroxine Sodium (Synthroid)  0.075 mg PO QDAC [facility]


   Stop: 10/28/18 07:29


   Last Admin: 18 06:32 Dose:  0.075 mg


Lorazepam (Ativan)  0.5 mg PO Q6HR PRN; Protocol


   PRN Reason: Anxiety


   Stop: 10/27/18 20:03


   Last Admin: 18 00:37 Dose:  0.5 mg


Metoprolol Succinate (Toprol Xl)  50 mg PO DAILY [facility]


   Stop: 10/28/18 08:59


   Last Admin: 18 08:38 Dose:  50 mg


Olanzapine (Zyprexa)  10 mg PO HS [facility]; Protocol


   Stop: 18 20:59


   Last Admin: 18 20:43 Dose:  10 mg








General: demented


HEENT: NC/AT, PERRLA, EOMI, anicteric sclerae, throat clear


Neck: Supple, No JVD, No thyromegaly, +2 carotid pulse wo bruit


Lungs: CTAB


Cardiovascular: RRR, Normal S1, Normal S2, without murmur


Abdomen: soft, non-tender, non-distended


Extremities: clear


Neurological: no change





- Procedures


Procedures: 


 Procedures











Procedure Code Date


 


GROUP PSYCHOTHERAPY 23349 16


 


GROUP PSYCHOTHERAPY GZHZZZZ 16


 


GROUP PSYCHOTHERAPY [id number] 16


 


GROUP PSYCHOTHERAPY GZHZZZZ 16


 


INDIVID PSYCHOTHERAP NEC 94.39 08


 


OTHER GROUP THERAPY 94.44 08/27/15


 


RECREATIONAL THERAPY 93.81 13














Internal Medicine Assmt/Plan





- Assessment


Assessment: 





1.HTN.


2.HYPOTHYROIISIM.


3.DEMENTIA.


4.PSYCHOSIS





- Plan


Plan: 





CONTINUE ON CURRENT MEDICATION AND DIET.





Nutritional Asmnt/Malnutr-PDOC





- Dietary Evaluation


Malnutrition Findings (Please click <Entered> for more info): 








Nutritional Asmnt/Malnutrition                             Start:  18 10:

05


Text:                                                      Status: Complete    

  


Freq:                                                                          

  


Protocol:                                                                      

  


 Document     18 10:05  MMNEEMA  (Rec: 18 10:25  MMNEEMA COLEMAN-

FNS1)


 Nutritional Asmnt/Malnutrition


     Patient General Information


      Nutritional Screening                      Low Risk


      Diagnosis                                  Psychosis


      Pertinent Medical Hx/Surgical Hx           HTN, hypothyroidism, dementia,


                                                 psychosis


      Subjective Information                     Patient seen in bed at time of


                                                 RD visit. Per nursing notes


                                                 patient remains confused.


                                                 Tolerating current diet order


                                                 without difficulty.


      Current Diet Order/ Nutrition Support      Regular


      Patient / S.O                              Not Indicated


      Pertinent Medications                      colace, synthroid


      Pertinent Labs                             () Na 130, albumin 3.8


     Nutritional Hx/Data


      Height                                     1.8 m


      Height (Calculated Centimeters)            180.3


      Current Weight (lbs)                       75.75 kg


      Weight (Calculated Kilograms)              75.7


      Weight (Calculated Grams)                  03605.9


      Ideal Body Weight                          172


      % Ideal Body Weight                        98


      Body Mass Index (BMI)                      23.3


      Recent Weight Change                       No


      Weight Status                              Approriate


     GI Symptoms


      GI Symptoms                                None


      Last BM                                    9/2 x 1


      Difficult in:                              None


      Food Allergies                             No


      Cultural/Ethnic/Zoroastrian Belief           none indicated


      Usual diet at home                         unknown


      Skin Integrity/Comment:                    Thom 19, intact


      Current %PO                                Good (%)


     Estimated Nutritional Goals


      BEE in Kcals:                              Using Current wt


      Calories/Kcals/Kg                          25-30 kcal/kg (using CBW 75.


                                                 9kg)


      Kcals Calculated                           ~3604-7114 kcal/day


      Protein:                                   Using Current wt


      Protein g/k gm/kg


      Protein Calculated                         ~75gm/day


      Fluid: ml                                  ~5323-1038 ml/day (1 ml/kcal)


     Nutritional Problem


      1. Problem


       Problem                                   Altered nutrition related lab


                                                 values related to


       Etiology                                  electrolyte imbalance aeb


       Signs/Symptoms:                           Na 130


     Intervention/Recommendation


      Comments                                   1. Continue regular diet as


                                                 tolerated by patient.


                                                 2. Consider rechecking Na


                                                 level, if continues to be


                                                 hyponatremic, consider fluid


                                                 restriction


     Expected Outcomes/Goals


      Expected Outcomes/Goals                    Oral intake >75% of mals,


                                                 weight stable, nutrition


                                                 related labs WNL

## 2018-09-10 RX ADMIN — LEVOTHYROXINE SODIUM SCH MG: 75 TABLET ORAL at 06:34

## 2018-09-10 RX ADMIN — Medication SCH TAB: at 09:01

## 2018-09-10 NOTE — INTERNAL MEDICINE PROG NOTE
Internal Medicine Subjective





- Subjective


Service Date: 09/10/18


Patient seen and examined:: without staff


Patient is:: awake, verbal, talking, confused


Per staff patient has:: no adverse event





Internal Medicine Objective





- Results


Result Diagrams: 


 18 13:33





 18 13:33


Recent Labs: 


 Laboratory Last Values











WBC  9.4 Th/cmm (4.8-10.8)   18  13:33    


 


RBC  4.78 Mil/cmm (3.80-5.80)   18  13:33    


 


Hgb  14.2 gm/dL (12-16)   18  13:33    


 


Hct  42.5 % (41.0-60)   18  13:33    


 


MCV  89.0 fl (80-99)   18  13:33    


 


MCH  29.6 pg (27.0-31.0)   18  13:33    


 


MCHC Differential  33.3 pg (28.0-36.0)   18  13:33    


 


RDW  13.5 % (11.5-20.0)   18  13:33    


 


Plt Count  245 Th/cmm (150-400)   18  13:33    


 


MPV  7.6 fl  18  13:33    


 


Neutrophils %  79.2 % (40.0-80.0)   18  13:33    


 


Lymphocytes %  12.9 % (20.0-50.0)  L  18  13:33    


 


Monocytes %  5.3 % (2.0-10.0)   18  13:33    


 


Eosinophils %  0.7 % (0.0-5.0)   18  13:33    


 


Basophils %  1.9 % (0.0-2.0)   18  13:33    


 


Sodium  130 mEq/L (136-145)  L  18  13:33    


 


Potassium  4.3 mEq/L (3.5-5.1)   18  13:33    


 


Chloride  99 mEq/L ()   18  13:33    


 


Carbon Dioxide  25.7 mEq/L (21.0-31.0)   18  13:33    


 


Anion Gap  9.6  (7.0-16.0)   18  13:33    


 


BUN  17 mg/dL (7-25)   18  13:33    


 


Creatinine  1.0 mg/dL (0.7-1.3)   18  13:33    


 


Est GFR ( Amer)  TNP   18  13:33    


 


Est GFR (Non-Af Amer)  TNP   18  13:33    


 


BUN/Creatinine Ratio  17.0   18  13:33    


 


Glucose  135 mg/dL ()  H  18  13:33    


 


POC Glucose  74 MG/DL (70 - 105)   18  06:42    


 


Hemoglobin A1c %  6.3 % (4.0-6.0)  H  18  13:33    


 


Calcium  8.8 mg/dL (8.6-10.3)   18  13:33    


 


Total Bilirubin  0.5 mg/dL (0.3-1.0)   18  13:33    


 


AST  31 U/L (13-39)   18  13:33    


 


ALT  17 U/L (7-52)   18  13:33    


 


Alkaline Phosphatase  35 U/L ()   18  13:33    


 


Troponin I  0.01 ng/mL (0.01-0.05)   18  13:33    


 


Total Protein  6.1 gm/dL (6.0-8.3)   18  13:33    


 


Albumin  3.8 gm/dL (4.2-5.5)  L  18  13:33    


 


Globulin  2.3 gm/dL  18  13:33    


 


Albumin/Globulin Ratio  1.7  (1.0-1.8)   18  13:33    


 


Triglycerides  98 mg/dL (<150)   18  13:33    


 


Cholesterol  158 mg/dL (<200)   18  13:33    


 


LDL Cholesterol Direct  79 mg/dL ()   18  13:33    


 


HDL Cholesterol  61 mg/dL (23-92)   18  13:33    


 


TSH  0.87 uIU/ml (0.34-5.60)   18  13:33    


 


Salicylates  < 25.0 mg/L (30.0-100.0)  L  18  13:33    


 


Acetaminophen  < 10.0 ug/mL (10.0-30.0)  L  18  13:33    


 


Ethyl Alcohol  < 10 mg/dL (0-10)   18  13:33    


 


RPR  NONREACTIVE  (NONREACTIVE)   18  13:33    














- Physical Exam


Vitals and I&O: 


 Vital Signs











Temp  97.4 F   09/10/18 14:00


 


Pulse  72   09/10/18 14:00


 


Resp  20   09/10/18 14:00


 


BP  126/68   09/10/18 14:00


 


Pulse Ox  96   09/10/18 14:00








 Intake & Output











 09/10/18 09/10/18 09/11/18





 06:59 18:59 06:59


 


Intake Total 420 900 


 


Balance 420 900 


 


Weight (lbs) 75.75 kg  


 


Intake:   


 


  Oral 420 900 


 


Other:   


 


  # Voids 2 4 


 


  # Bowel Movements 0 1 


 


  Weight Source Bedscale  











Active Medications: 


Current Medications





Acetaminophen (Tylenol)  650 mg PO Q4HR PRN


   PRN Reason: Mild Pain / Temp above 100


   Stop: 10/27/18 16:04


Aripiprazole (Abilify)  10 mg PO DAILY [facility]; Protocol


   Stop: 18 08:59


   Last Admin: 09/10/18 09:01 Dose:  10 mg


Benztropine Mesylate (Cogentin)  1 mg PO BID PRN


   PRN Reason: extrapyramidal side effects


   Stop: 10/27/18 16:04


Divalproex Sodium (Depakote Er)  500 mg PO Q12HR [facility]; Protocol


   Stop: 10/27/18 22:59


   Last Admin: 09/10/18 09:01 Dose:  500 mg


Docusate Sodium (Colace)  100 mg PO BID [facility]


   Stop: 10/27/18 16:59


   Last Admin: 09/10/18 16:34 Dose:  100 mg


Levothyroxine Sodium (Synthroid)  0.075 mg PO QDAC [facility]


   Stop: 10/28/18 07:29


   Last Admin: 09/10/18 06:34 Dose:  0.075 mg


Lorazepam (Ativan)  0.5 mg PO Q6HR PRN; Protocol


   PRN Reason: Anxiety


   Stop: 10/27/18 20:03


   Last Admin: 09/10/18 16:34 Dose:  0.5 mg


Metoprolol Succinate (Toprol Xl)  50 mg PO DAILY [facility]


   Stop: 10/28/18 08:59


   Last Admin: 09/10/18 09:01 Dose:  50 mg








General: demented


HEENT: NC/AT, PERRLA, EOMI, anicteric sclerae, throat clear


Neck: Supple, No JVD, No thyromegaly, +2 carotid pulse wo bruit


Lungs: CTAB


Cardiovascular: RRR, Normal S1, Normal S2, without murmur


Abdomen: soft, non-tender, non-distended


Extremities: clear


Neurological: no change





- Procedures


Procedures: 


 Procedures











Procedure Code Date


 


GROUP PSYCHOTHERAPY 53155 16


 


GROUP PSYCHOTHERAPY GZHZZZZ 16


 


GROUP PSYCHOTHERAPY [id number] 16


 


GROUP PSYCHOTHERAPY GZHZZZZ 16


 


INDIVID PSYCHOTHERAP NEC 94.39 08


 


OTHER GROUP THERAPY 94.44 08/27/15


 


RECREATIONAL THERAPY 93.81 13














Internal Medicine Assmt/Plan





- Assessment


Assessment: 





1.HTN.


2.HYPOTHYROIISIM.


3.DEMENTIA.


4.PSYCHOSIS





- Plan


Plan: 





CONTINUE ON CURRENT MEDICATION AND DIET.





Nutritional Asmnt/Malnutr-PDOC





- Dietary Evaluation


Malnutrition Findings (Please click <Entered> for more info): 








Nutritional Asmnt/Malnutrition                             Start:  18 10:

05


Text:                                                      Status: Complete    

  


Freq:                                                                          

  


Protocol:                                                                      

  


 Document     18 10:05  ELE  (Rec: 18 10:25  ELE COLEMAN-

FNS1)


 Nutritional Asmnt/Malnutrition


     Patient General Information


      Nutritional Screening                      Low Risk


      Diagnosis                                  Psychosis


      Pertinent Medical Hx/Surgical Hx           HTN, hypothyroidism, dementia,


                                                 psychosis


      Subjective Information                     Patient seen in bed at time of


                                                 RD visit. Per nursing notes


                                                 patient remains confused.


                                                 Tolerating current diet order


                                                 without difficulty.


      Current Diet Order/ Nutrition Support      Regular


      Patient / S.O                              Not Indicated


      Pertinent Medications                      colace, synthroid


      Pertinent Labs                             () Na 130, albumin 3.8


     Nutritional Hx/Data


      Height                                     1.8 m


      Height (Calculated Centimeters)            180.3


      Current Weight (lbs)                       75.75 kg


      Weight (Calculated Kilograms)              75.7


      Weight (Calculated Grams)                  35076.9


      Ideal Body Weight                          172


      % Ideal Body Weight                        98


      Body Mass Index (BMI)                      23.3


      Recent Weight Change                       No


      Weight Status                              Approriate


     GI Symptoms


      GI Symptoms                                None


      Last BM                                    /2 x 1


      Difficult in:                              None


      Food Allergies                             No


      Cultural/Ethnic/Caodaism Belief           none indicated


      Usual diet at home                         unknown


      Skin Integrity/Comment:                    Thom 19, intact


      Current %PO                                Good (%)


     Estimated Nutritional Goals


      BEE in Kcals:                              Using Current wt


      Calories/Kcals/Kg                          25-30 kcal/kg (using CBW 75.


                                                 9kg)


      Kcals Calculated                           ~3752-7175 kcal/day


      Protein:                                   Using Current wt


      Protein g/k gm/kg


      Protein Calculated                         ~75gm/day


      Fluid: ml                                  ~6814-5295 ml/day (1 ml/kcal)


     Nutritional Problem


      1. Problem


       Problem                                   Altered nutrition related lab


                                                 values related to


       Etiology                                  electrolyte imbalance aeb


       Signs/Symptoms:                           Na 130


     Intervention/Recommendation


      Comments                                   1. Continue regular diet as


                                                 tolerated by patient.


                                                 2. Consider rechecking Na


                                                 level, if continues to be


                                                 hyponatremic, consider fluid


                                                 restriction


     Expected Outcomes/Goals


      Expected Outcomes/Goals                    Oral intake >75% of mals,


                                                 weight stable, nutrition


                                                 related labs WNL

## 2018-09-10 NOTE — PROGRESS NOTES
DATE:  09/09/2018



PSYCHIATRIC PROGRESS NOTE



SUBJECTIVE:  Staff was spoken to.  The patient is interviewed.  Mood is noted to

be irritable.  Affect is constricted.  The patient has been pacing on the unit. 

The patient's insight and judgment are noted to be still impaired.  Impulse

control is noted to be limited.  Coping skills are noted to be limited.  The

patient has been able to tolerate the Abilify.  No side effects to the

medications are noted.



PLAN:  To continue the patient with Abilify 10 mg and gradually decrease the

dose of the olanzapine to 5 mg and follow the patient up.





DD: 09/09/2018 15:03

DT: 09/10/2018 01:19

JOB# [id number]  [id number]

## 2018-09-11 RX ADMIN — LEVOTHYROXINE SODIUM SCH MG: 75 TABLET ORAL at 06:49

## 2018-09-11 RX ADMIN — Medication SCH TAB: at 08:48

## 2018-09-11 NOTE — PROGRESS NOTES
DATE:  09/10/2018



PSYCHIATRIC PROGRESS NOTE



SUBJECTIVE:  Staff was spoken to.  The patient is interviewed.  Mood is noted to

be irritable.  Affect is constricted.  Insight and judgment at this time are

noted to be impaired.  Impulse control seems to be poor.  Coping skills are

noted to be very poor.  No side effects to the medications are noted.  The

patient is currently on the Abilify and has been able to tolerate the medication

and hence it is decided to discontinue the Zyprexa.



PLAN:  Continue the patient with the Abilify and then the valproic acid and

follow the patient with the supportive therapy.





DD: 09/10/2018 18:32

DT: 09/11/2018 03:39

JOB# [id number]  [id number]

## 2018-09-11 NOTE — PROGRESS NOTES
DATE:  09/11/2018



SUBJECTIVE:  Staff was spoken to.  The patient is interviewed.  Mood is noted to

be irritable.  Affect is constricted.  Insight and judgment are noted to be

still impaired.  Impulse control is noted to be poor.  Coping skills are also

noted to be poor.  No side effects to the medications are noted.  The patient

has been having difficult time to cope with the stress.  The patient has

paranoia, but denies any command hallucinations.  No side effects to the

medications are noted.  The patient had been on the Abilify and he has been able

to tolerate.  The patient is currently on 10 mg of the Abilify.  No side effects

to the medications are noted.  The patient has been taking half of the

olanzapine.



ASSESSMENT:  The patient is stabilizing.



PLAN:  To continue the patient with the current medications and follow the

patient up and if the patient is not a problem possibly the patient is going to

be discharged tomorrow for followup on outpatient basis.





DD: 09/11/2018 18:07

DT: 09/11/2018 23:57

JOB# [id number]  [id number]

## 2018-09-11 NOTE — INTERNAL MEDICINE PROG NOTE
Internal Medicine Subjective





- Subjective


Service Date: 18


Patient seen and examined:: without staff


Patient is:: awake, verbal, talking, confused


Per staff patient has:: no adverse event





Internal Medicine Objective





- Results


Result Diagrams: 


 18 13:33





 18 13:33


Recent Labs: 


 Laboratory Last Values











WBC  9.4 Th/cmm (4.8-10.8)   18  13:33    


 


RBC  4.78 Mil/cmm (3.80-5.80)   18  13:33    


 


Hgb  14.2 gm/dL (12-16)   18  13:33    


 


Hct  42.5 % (41.0-60)   18  13:33    


 


MCV  89.0 fl (80-99)   18  13:33    


 


MCH  29.6 pg (27.0-31.0)   18  13:33    


 


MCHC Differential  33.3 pg (28.0-36.0)   18  13:33    


 


RDW  13.5 % (11.5-20.0)   18  13:33    


 


Plt Count  245 Th/cmm (150-400)   18  13:33    


 


MPV  7.6 fl  18  13:33    


 


Neutrophils %  79.2 % (40.0-80.0)   18  13:33    


 


Lymphocytes %  12.9 % (20.0-50.0)  L  18  13:33    


 


Monocytes %  5.3 % (2.0-10.0)   18  13:33    


 


Eosinophils %  0.7 % (0.0-5.0)   18  13:33    


 


Basophils %  1.9 % (0.0-2.0)   18  13:33    


 


Sodium  130 mEq/L (136-145)  L  18  13:33    


 


Potassium  4.3 mEq/L (3.5-5.1)   18  13:33    


 


Chloride  99 mEq/L ()   18  13:33    


 


Carbon Dioxide  25.7 mEq/L (21.0-31.0)   18  13:33    


 


Anion Gap  9.6  (7.0-16.0)   18  13:33    


 


BUN  17 mg/dL (7-25)   18  13:33    


 


Creatinine  1.0 mg/dL (0.7-1.3)   18  13:33    


 


Est GFR ( Amer)  TNP   18  13:33    


 


Est GFR (Non-Af Amer)  TNP   18  13:33    


 


BUN/Creatinine Ratio  17.0   18  13:33    


 


Glucose  135 mg/dL ()  H  18  13:33    


 


POC Glucose  74 MG/DL (70 - 105)   18  06:42    


 


Hemoglobin A1c %  6.3 % (4.0-6.0)  H  18  13:33    


 


Calcium  8.8 mg/dL (8.6-10.3)   18  13:33    


 


Total Bilirubin  0.5 mg/dL (0.3-1.0)   18  13:33    


 


AST  31 U/L (13-39)   18  13:33    


 


ALT  17 U/L (7-52)   18  13:33    


 


Alkaline Phosphatase  35 U/L ()   18  13:33    


 


Troponin I  0.01 ng/mL (0.01-0.05)   18  13:33    


 


Total Protein  6.1 gm/dL (6.0-8.3)   18  13:33    


 


Albumin  3.8 gm/dL (4.2-5.5)  L  18  13:33    


 


Globulin  2.3 gm/dL  18  13:33    


 


Albumin/Globulin Ratio  1.7  (1.0-1.8)   18  13:33    


 


Triglycerides  98 mg/dL (<150)   18  13:33    


 


Cholesterol  158 mg/dL (<200)   18  13:33    


 


LDL Cholesterol Direct  79 mg/dL ()   18  13:33    


 


HDL Cholesterol  61 mg/dL (23-92)   18  13:33    


 


TSH  0.87 uIU/ml (0.34-5.60)   18  13:33    


 


Salicylates  < 25.0 mg/L (30.0-100.0)  L  18  13:33    


 


Acetaminophen  < 10.0 ug/mL (10.0-30.0)  L  18  13:33    


 


Ethyl Alcohol  < 10 mg/dL (0-10)   18  13:33    


 


RPR  NONREACTIVE  (NONREACTIVE)   18  13:33    














- Physical Exam


Vitals and I&O: 


 Vital Signs











Temp  97.2 F   18 14:00


 


Pulse  71   18 14:00


 


Resp  18   18 14:00


 


BP  157/69   18 14:00


 


Pulse Ox  95   18 14:00








 Intake & Output











 18





 06:59 18:59 06:59


 


Intake Total 240 1450 


 


Balance 240 1450 


 


Weight (lbs) 75.75 kg  


 


Intake:   


 


  Oral 240 1450 


 


Other:   


 


  # Voids 3 3 


 


  # Bowel Movements 0 0 


 


  Weight Source Bedscale  











Active Medications: 


Current Medications





Acetaminophen (Tylenol)  650 mg PO Q4HR PRN


   PRN Reason: Mild Pain / Temp above 100


   Stop: 10/27/18 16:04


Aripiprazole (Abilify)  10 mg PO DAILY [facility]; Protocol


   Stop: 18 08:59


   Last Admin: 18 08:48 Dose:  10 mg


Benztropine Mesylate (Cogentin)  1 mg PO BID PRN


   PRN Reason: extrapyramidal side effects


   Stop: 10/27/18 16:04


Divalproex Sodium (Depakote Er)  500 mg PO Q12HR [facility]; Protocol


   Stop: 10/27/18 22:59


   Last Admin: 18 08:48 Dose:  500 mg


Docusate Sodium (Colace)  100 mg PO BID [facility]


   Stop: 10/27/18 16:59


   Last Admin: 18 16:50 Dose:  100 mg


Levothyroxine Sodium (Synthroid)  0.075 mg PO QDAC [facility]


   Stop: 10/28/18 07:29


   Last Admin: 18 06:49 Dose:  0.075 mg


Lorazepam (Ativan)  0.5 mg PO Q6HR PRN; Protocol


   PRN Reason: Anxiety


   Stop: 10/27/18 20:03


   Last Admin: 09/10/18 16:34 Dose:  0.5 mg


Metoprolol Succinate (Toprol Xl)  50 mg PO DAILY [facility]


   Stop: 10/28/18 08:59


   Last Admin: 18 08:49 Dose:  Not Given








General: demented


HEENT: NC/AT, PERRLA, EOMI, anicteric sclerae, throat clear


Neck: Supple, No JVD, No thyromegaly, +2 carotid pulse wo bruit


Lungs: CTAB


Cardiovascular: RRR, Normal S1, Normal S2, without murmur


Abdomen: soft, non-tender, non-distended


Extremities: clear


Neurological: no change





- Procedures


Procedures: 


 Procedures











Procedure Code Date


 


GROUP PSYCHOTHERAPY 37831 16


 


GROUP PSYCHOTHERAPY GZHZZZZ 16


 


GROUP PSYCHOTHERAPY 46896 16


 


GROUP PSYCHOTHERAPY GZHZZZZ 16


 


INDIVID PSYCHOTHERAP NEC 94.39 08


 


OTHER GROUP THERAPY 94.44 08/27/15


 


RECREATIONAL THERAPY 93.81 13














Internal Medicine Assmt/Plan





- Assessment


Assessment: 





1.HTN.


2.HYPOTHYROIISIM.


3.DEMENTIA.


4.PSYCHOSIS





- Plan


Plan: 





CONTINUE ON CURRENT MEDICATION AND DIET.





Nutritional Asmnt/Malnutr-PDOC





- Dietary Evaluation


Malnutrition Findings (Please click <Entered> for more info): 








Nutritional Asmnt/Malnutrition                             Start:  18 10:

05


Text:                                                      Status: Complete    

  


Freq:                                                                          

  


Protocol:                                                                      

  


 Document     18 10:05  ELE  (Rec: 18 10:25  ELE COLEMAN-

FNS1)


 Nutritional Asmnt/Malnutrition


     Patient General Information


      Nutritional Screening                      Low Risk


      Diagnosis                                  Psychosis


      Pertinent Medical Hx/Surgical Hx           HTN, hypothyroidism, dementia,


                                                 psychosis


      Subjective Information                     Patient seen in bed at time of


                                                 RD visit. Per nursing notes


                                                 patient remains confused.


                                                 Tolerating current diet order


                                                 without difficulty.


      Current Diet Order/ Nutrition Support      Regular


      Patient / S.O                              Not Indicated


      Pertinent Medications                      colace, synthroid


      Pertinent Labs                             () Na 130, albumin 3.8


     Nutritional Hx/Data


      Height                                     1.8 m


      Height (Calculated Centimeters)            180.3


      Current Weight (lbs)                       75.75 kg


      Weight (Calculated Kilograms)              75.7


      Weight (Calculated Grams)                  37890.9


      Ideal Body Weight                          172


      % Ideal Body Weight                        98


      Body Mass Index (BMI)                      23.3


      Recent Weight Change                       No


      Weight Status                              Approriate


     GI Symptoms


      GI Symptoms                                None


      Last BM                                    9/2 x 1


      Difficult in:                              None


      Food Allergies                             No


      Cultural/Ethnic/Holiness Belief           none indicated


      Usual diet at home                         unknown


      Skin Integrity/Comment:                    Thom 19, intact


      Current %PO                                Good (%)


     Estimated Nutritional Goals


      BEE in Kcals:                              Using Current wt


      Calories/Kcals/Kg                          25-30 kcal/kg (using CBW 75.


                                                 9kg)


      Kcals Calculated                           ~9767-8389 kcal/day


      Protein:                                   Using Current wt


      Protein g/k gm/kg


      Protein Calculated                         ~75gm/day


      Fluid: ml                                  ~2771-8986 ml/day (1 ml/kcal)


     Nutritional Problem


      1. Problem


       Problem                                   Altered nutrition related lab


                                                 values related to


       Etiology                                  electrolyte imbalance aeb


       Signs/Symptoms:                           Na 130


     Intervention/Recommendation


      Comments                                   1. Continue regular diet as


                                                 tolerated by patient.


                                                 2. Consider rechecking Na


                                                 level, if continues to be


                                                 hyponatremic, consider fluid


                                                 restriction


     Expected Outcomes/Goals


      Expected Outcomes/Goals                    Oral intake >75% of mals,


                                                 weight stable, nutrition


                                                 related labs WNL

## 2018-09-12 RX ADMIN — Medication SCH TAB: at 08:20

## 2018-09-12 RX ADMIN — LEVOTHYROXINE SODIUM SCH MG: 75 TABLET ORAL at 06:36

## 2018-09-12 NOTE — INTERNAL MEDICINE PROG NOTE
Internal Medicine Subjective





- Subjective


Service Date: 18


Patient seen and examined:: without staff


Patient is:: awake, verbal, talking, confused


Per staff patient has:: no adverse event





Internal Medicine Objective





- Results


Result Diagrams: 


 18 13:33





 18 13:33


Recent Labs: 


 Laboratory Last Values











WBC  9.4 Th/cmm (4.8-10.8)   18  13:33    


 


RBC  4.78 Mil/cmm (3.80-5.80)   18  13:33    


 


Hgb  14.2 gm/dL (12-16)   18  13:33    


 


Hct  42.5 % (41.0-60)   18  13:33    


 


MCV  89.0 fl (80-99)   18  13:33    


 


MCH  29.6 pg (27.0-31.0)   18  13:33    


 


MCHC Differential  33.3 pg (28.0-36.0)   18  13:33    


 


RDW  13.5 % (11.5-20.0)   18  13:33    


 


Plt Count  245 Th/cmm (150-400)   18  13:33    


 


MPV  7.6 fl  18  13:33    


 


Neutrophils %  79.2 % (40.0-80.0)   18  13:33    


 


Lymphocytes %  12.9 % (20.0-50.0)  L  18  13:33    


 


Monocytes %  5.3 % (2.0-10.0)   18  13:33    


 


Eosinophils %  0.7 % (0.0-5.0)   18  13:33    


 


Basophils %  1.9 % (0.0-2.0)   18  13:33    


 


Sodium  130 mEq/L (136-145)  L  18  13:33    


 


Potassium  4.3 mEq/L (3.5-5.1)   18  13:33    


 


Chloride  99 mEq/L ()   18  13:33    


 


Carbon Dioxide  25.7 mEq/L (21.0-31.0)   18  13:33    


 


Anion Gap  9.6  (7.0-16.0)   18  13:33    


 


BUN  17 mg/dL (7-25)   18  13:33    


 


Creatinine  1.0 mg/dL (0.7-1.3)   18  13:33    


 


Est GFR ( Amer)  TNP   18  13:33    


 


Est GFR (Non-Af Amer)  TNP   18  13:33    


 


BUN/Creatinine Ratio  17.0   18  13:33    


 


Glucose  135 mg/dL ()  H  18  13:33    


 


POC Glucose  74 MG/DL (70 - 105)   18  06:42    


 


Hemoglobin A1c %  6.3 % (4.0-6.0)  H  18  13:33    


 


Calcium  8.8 mg/dL (8.6-10.3)   18  13:33    


 


Total Bilirubin  0.5 mg/dL (0.3-1.0)   18  13:33    


 


AST  31 U/L (13-39)   18  13:33    


 


ALT  17 U/L (7-52)   18  13:33    


 


Alkaline Phosphatase  35 U/L ()   18  13:33    


 


Troponin I  0.01 ng/mL (0.01-0.05)   18  13:33    


 


Total Protein  6.1 gm/dL (6.0-8.3)   18  13:33    


 


Albumin  3.8 gm/dL (4.2-5.5)  L  18  13:33    


 


Globulin  2.3 gm/dL  18  13:33    


 


Albumin/Globulin Ratio  1.7  (1.0-1.8)   18  13:33    


 


Triglycerides  98 mg/dL (<150)   18  13:33    


 


Cholesterol  158 mg/dL (<200)   18  13:33    


 


LDL Cholesterol Direct  79 mg/dL ()   18  13:33    


 


HDL Cholesterol  61 mg/dL (23-92)   18  13:33    


 


TSH  0.87 uIU/ml (0.34-5.60)   18  13:33    


 


Salicylates  < 25.0 mg/L (30.0-100.0)  L  18  13:33    


 


Acetaminophen  < 10.0 ug/mL (10.0-30.0)  L  18  13:33    


 


Ethyl Alcohol  < 10 mg/dL (0-10)   18  13:33    


 


RPR  NONREACTIVE  (NONREACTIVE)   18  13:33    














- Physical Exam


Vitals and I&O: 


 Vital Signs











Temp  96.9 F   18 06:42


 


Pulse  77   18 08:19


 


Resp  20   18 06:42


 


BP  143/86   18 08:19


 


Pulse Ox  99   18 06:42








 Intake & Output











 18





 18:59 06:59 18:59


 


Intake Total 1450 240 


 


Balance 1450 240 


 


Weight (lbs)  75.75 kg 


 


Intake:   


 


  Oral 1450 240 


 


Other:   


 


  # Voids 3 3 


 


  # Bowel Movements 0 0 


 


  Weight Source  Bedscale 











Active Medications: 


Current Medications





Acetaminophen (Tylenol)  650 mg PO Q4HR PRN


   PRN Reason: Mild Pain / Temp above 100


   Stop: 10/27/18 16:04


Aripiprazole (Abilify)  10 mg PO DAILY [facility]; Protocol


   Stop: 18 08:59


   Last Admin: 18 08:20 Dose:  10 mg


Benztropine Mesylate (Cogentin)  1 mg PO BID PRN


   PRN Reason: extrapyramidal side effects


   Stop: 10/27/18 16:04


Divalproex Sodium (Depakote Er)  500 mg PO Q12HR [facility]; Protocol


   Stop: 10/27/18 22:59


   Last Admin: 18 08:20 Dose:  500 mg


Docusate Sodium (Colace)  100 mg PO BID [facility]


   Stop: 10/27/18 16:59


   Last Admin: 18 08:20 Dose:  100 mg


Levothyroxine Sodium (Synthroid)  0.075 mg PO QDAC [facility]


   Stop: 10/28/18 07:29


   Last Admin: 18 06:36 Dose:  0.075 mg


Lorazepam (Ativan)  0.5 mg PO Q6HR PRN; Protocol


   PRN Reason: Anxiety


   Stop: 10/27/18 20:03


   Last Admin: 18 21:47 Dose:  0.5 mg


Metoprolol Succinate (Toprol Xl)  50 mg PO DAILY [facility]


   Stop: 10/28/18 08:59


   Last Admin: 18 08:19 Dose:  50 mg








General: demented


HEENT: NC/AT, PERRLA, EOMI, anicteric sclerae, throat clear


Neck: Supple, No JVD, No thyromegaly, +2 carotid pulse wo bruit


Lungs: CTAB


Cardiovascular: RRR, Normal S1, Normal S2, without murmur


Abdomen: soft, non-tender, non-distended


Extremities: clear


Neurological: no change





- Procedures


Procedures: 


 Procedures











Procedure Code Date


 


GROUP PSYCHOTHERAPY 10096 16


 


GROUP PSYCHOTHERAPY GZHZZZZ 16


 


GROUP PSYCHOTHERAPY 30686 16


 


GROUP PSYCHOTHERAPY GZHZZZZ 16


 


INDIVID PSYCHOTHERAP NEC 94.39 08


 


OTHER GROUP THERAPY 94.44 08/27/15


 


RECREATIONAL THERAPY 93.81 13














Internal Medicine Assmt/Plan





- Assessment


Assessment: 





1.HTN.


2.HYPOTHYROIISIM.


3.DEMENTIA.


4.PSYCHOSIS





- Plan


Plan: 





CONTINUE ON CURRENT MEDICATION AND DIET.





Nutritional Asmnt/Malnutr-PDOC





- Dietary Evaluation


Malnutrition Findings (Please click <Entered> for more info): 








Nutritional Asmnt/Malnutrition                             Start:  18 10:

05


Text:                                                      Status: Complete    

  


Freq:                                                                          

  


Protocol:                                                                      

  


 Document     18 10:05  ELE  (Rec: 18 10:25  ELE COLEMAN-

FNS1)


 Nutritional Asmnt/Malnutrition


     Patient General Information


      Nutritional Screening                      Low Risk


      Diagnosis                                  Psychosis


      Pertinent Medical Hx/Surgical Hx           HTN, hypothyroidism, dementia,


                                                 psychosis


      Subjective Information                     Patient seen in bed at time of


                                                 RD visit. Per nursing notes


                                                 patient remains confused.


                                                 Tolerating current diet order


                                                 without difficulty.


      Current Diet Order/ Nutrition Support      Regular


      Patient / S.O                              Not Indicated


      Pertinent Medications                      colace, synthroid


      Pertinent Labs                             () Na 130, albumin 3.8


     Nutritional Hx/Data


      Height                                     1.8 m


      Height (Calculated Centimeters)            180.3


      Current Weight (lbs)                       75.75 kg


      Weight (Calculated Kilograms)              75.7


      Weight (Calculated Grams)                  88845.9


      Ideal Body Weight                          172


      % Ideal Body Weight                        98


      Body Mass Index (BMI)                      23.3


      Recent Weight Change                       No


      Weight Status                              Approriate


     GI Symptoms


      GI Symptoms                                None


      Last BM                                    9/2 x 1


      Difficult in:                              None


      Food Allergies                             No


      Cultural/Ethnic/Hindu Belief           none indicated


      Usual diet at home                         unknown


      Skin Integrity/Comment:                    Thom 19, intact


      Current %PO                                Good (%)


     Estimated Nutritional Goals


      BEE in Kcals:                              Using Current wt


      Calories/Kcals/Kg                          25-30 kcal/kg (using CBW 75.


                                                 9kg)


      Kcals Calculated                           ~8820-5770 kcal/day


      Protein:                                   Using Current wt


      Protein g/k gm/kg


      Protein Calculated                         ~75gm/day


      Fluid: ml                                  ~1518-2095 ml/day (1 ml/kcal)


     Nutritional Problem


      1. Problem


       Problem                                   Altered nutrition related lab


                                                 values related to


       Etiology                                  electrolyte imbalance aeb


       Signs/Symptoms:                           Na 130


     Intervention/Recommendation


      Comments                                   1. Continue regular diet as


                                                 tolerated by patient.


                                                 2. Consider rechecking Na


                                                 level, if continues to be


                                                 hyponatremic, consider fluid


                                                 restriction


     Expected Outcomes/Goals


      Expected Outcomes/Goals                    Oral intake >75% of mals,


                                                 weight stable, nutrition


                                                 related labs WNL

## 2018-09-13 NOTE — PROGRESS NOTES
DATE:  09/12/2018



PSYCHIATRIC PROGRESS NOTE



SUBJECTIVE:  Staff was spoken to.  The patient is interviewed.  Mood is noted to

be anxious.  Affect is appropriate.  Not suicidal or homicidal.  Insight and

judgment are noted to be improving.  Impulse control seems to be fair.  The

patient has paranoia, but denies any command hallucinations.  No side effects to

the Abilify noted.



ASSESSMENT:  The patient is stabilizing.



PLAN:  To discharge the patient today for followup on outpatient basis.





DD: 09/12/2018 18:49

DT: 09/13/2018 07:05

JOB# [id number]  [id number]

## 2018-09-17 NOTE — DISCHARGE SUMMARY
DATE OF DISCHARGE:  09/12/2018



IDENTIFYING DATA:  The patient is a 73-year-old  male, resident of a

skilled nursing facility.



JUSTIFICATION OF HOSPITALIZATION:  The patient is admitted on a voluntary basis

in view of his acute agitation and striking out at the staff members.



CHIEF COMPLAINT:  "I am okay."  The patient is uncooperative at the time of

the evaluation.



DIAGNOSES AT THE TIME OF ADMISSION:

AXIS IA:  Schizoaffective disorder.

AXIS IB.  Dementia and behavioral change, secondary trait.

AXIS II:  None.

AXIS III:  As per Dr. Albert.



HOSPITAL COURSE AND RESPONSE TO TREATMENT:  The patient has been observed on

inpatient unit, provided with supportive psychotherapy.  The patient has been

closely monitored.  He is encouraged to verbalize the concerns rather than to

act out.  The patient has been placed on the Zyprexa that was gradually

discontinued and the patient has been placed on the Aripiprazole that was

gradually increased to 10 mg and with the medications the patient has been

observed and he was able to tolerate the Aripiprazole and the patient was

finally discharged on 09/12/2018 with recommendation that he is going to be

seeking treatment on an outpatient basis.



MENTAL STATUS EXAMINATION:  At the time of the discharge, the patient's mood is

noted to be anxious.  Affect is appropriate.  He is not suicidal or homicidal. 

Insight and judgment are noted to be improving.  Impulse control seems to be

fair.  Coping skills are also noted to be fair.  The patient has been able to

verbalize the concerns rather than to act out at the time of the discharge.



LABORATORY DATA:  Labs were done at the time of the hospitalization has been

reviewed by Dr. Albert and are noted to be within normal limits.





DD: 09/16/2018 14:09

DT: 09/17/2018 00:50

[facility]# [id number]  [id number]

## 2019-08-27 ENCOUNTER — HOSPITAL ENCOUNTER (INPATIENT)
Dept: HOSPITAL 36 - ER | Age: 74
LOS: 13 days | Discharge: NURSING, REHAB OR LONG-TERM CARE | DRG: 885 | End: 2019-09-09
Attending: PSYCHIATRY & NEUROLOGY | Admitting: PSYCHIATRY & NEUROLOGY
Payer: MEDICARE

## 2019-08-27 VITALS — DIASTOLIC BLOOD PRESSURE: 75 MMHG | SYSTOLIC BLOOD PRESSURE: 157 MMHG

## 2019-08-27 DIAGNOSIS — E03.9: ICD-10-CM

## 2019-08-27 DIAGNOSIS — F39: ICD-10-CM

## 2019-08-27 DIAGNOSIS — M19.90: ICD-10-CM

## 2019-08-27 DIAGNOSIS — G30.9: ICD-10-CM

## 2019-08-27 DIAGNOSIS — F63.9: ICD-10-CM

## 2019-08-27 DIAGNOSIS — G20: ICD-10-CM

## 2019-08-27 DIAGNOSIS — F29: ICD-10-CM

## 2019-08-27 DIAGNOSIS — F20.0: Primary | ICD-10-CM

## 2019-08-27 DIAGNOSIS — I10: ICD-10-CM

## 2019-08-27 DIAGNOSIS — F02.81: ICD-10-CM

## 2019-08-27 DIAGNOSIS — R13.10: ICD-10-CM

## 2019-08-27 LAB
ALBUMIN SERPL-MCNC: 3.7 GM/DL (ref 4.2–5.5)
ALBUMIN/GLOB SERPL: 1.3 {RATIO} (ref 1–1.8)
ALP SERPL-CCNC: 45 U/L (ref 34–104)
ALT SERPL-CCNC: 13 U/L (ref 7–52)
ANION GAP SERPL CALC-SCNC: 11 MMOL/L (ref 7–16)
APPEARANCE UR: CLEAR
AST SERPL-CCNC: 15 U/L (ref 13–39)
BACTERIA #/AREA URNS HPF: (no result) /HPF
BASOPHILS # BLD AUTO: 0 TH/CUMM (ref 0–0.2)
BASOPHILS NFR BLD AUTO: 0 % (ref 0–2)
BILIRUB SERPL-MCNC: 0.5 MG/DL (ref 0.3–1)
BILIRUB UR-MCNC: NEGATIVE MG/DL
BUN SERPL-MCNC: 17 MG/DL (ref 7–25)
CALCIUM SERPL-MCNC: 8.9 MG/DL (ref 8.6–10.3)
CHLORIDE SERPL-SCNC: 102 MEQ/L (ref 98–107)
CO2 SERPL-SCNC: 26 MEQ/L (ref 21–31)
COLOR UR: YELLOW
CREAT SERPL-MCNC: 0.6 MG/DL (ref 0.7–1.3)
EOSINOPHIL # BLD AUTO: 0.1 TH/CMM (ref 0.1–0.4)
EOSINOPHIL NFR BLD AUTO: 0.8 % (ref 0–5)
EPI CELLS URNS QL MICRO: (no result) /LPF
ERYTHROCYTE [DISTWIDTH] IN BLOOD BY AUTOMATED COUNT: 12.7 % (ref 11.5–20)
GLOBULIN SER-MCNC: 2.8 GM/DL
GLUCOSE SERPL-MCNC: 115 MG/DL (ref 70–105)
GLUCOSE UR STRIP-MCNC: NEGATIVE MG/DL
HCT VFR BLD CALC: 40.3 % (ref 41–60)
HGB BLD-MCNC: 13.7 GM/DL (ref 12–16)
INR PPP: 1.02 (ref 0.5–1.4)
KETONES UR STRIP-MCNC: NEGATIVE MG/DL
LEUKOCYTE ESTERASE UR-ACNC: NEGATIVE
LYMPHOCYTE AB SER FC-ACNC: 1.3 TH/CMM (ref 1.5–3)
LYMPHOCYTES NFR BLD AUTO: 15.6 % (ref 20–50)
MCH RBC QN AUTO: 30 PG (ref 27–31)
MCHC RBC AUTO-ENTMCNC: 34.1 PG (ref 28–36)
MCV RBC AUTO: 87.9 FL (ref 80–99)
MICRO URNS: YES
MONOCYTES # BLD AUTO: 0.5 TH/CMM (ref 0.3–1)
MONOCYTES NFR BLD AUTO: 6.2 % (ref 2–10)
NEUTROPHILS # BLD: 6.6 TH/CMM (ref 1.8–8)
NEUTROPHILS NFR BLD AUTO: 77.4 % (ref 40–80)
NITRITE UR QL STRIP: NEGATIVE
PH UR STRIP: 5.5 [PH] (ref 4.6–8)
PLATELET # BLD: 276 TH/CMM (ref 150–400)
POTASSIUM SERPL-SCNC: 4 MEQ/L (ref 3.5–5.1)
PROT UR STRIP-MCNC: NEGATIVE MG/DL
RBC # BLD AUTO: 4.58 MIL/CMM (ref 3.8–5.8)
RBC # UR STRIP: NEGATIVE /UL
RBC #/AREA URNS HPF: (no result) /HPF (ref 0–5)
SODIUM SERPL-SCNC: 135 MEQ/L (ref 136–145)
SP GR UR STRIP: 1.02 (ref 1–1.03)
URINALYSIS COMPLETE PNL UR: (no result)
UROBILINOGEN UR STRIP-ACNC: 0.2 E.U./DL (ref 0.2–1)
WBC # BLD AUTO: 8.5 TH/CMM (ref 4.8–10.8)
WBC #/AREA URNS HPF: (no result) /HPF (ref 0–5)

## 2019-08-27 PROCEDURE — Z7610: HCPCS

## 2019-08-27 PROCEDURE — G0410 GRP PSYCH PARTIAL HOSP 45-50: HCPCS

## 2019-08-27 NOTE — ED PHYSICIAN CHART
ED Chief Complaint/HPI





- Patient Information


Date Seen:: 08/27/19


Time Seen:: 19:53


Chief Complaint:: Agitation


History of Present Illness:: 


75 yo male was brought from SNF to ED for evaluation of increased agitation, 

aggressiveness towards staff and residents. Pt was reportedly slamming doors in 

the facility.


Allergies:: 


 Allergies











Allergy/AdvReac Type Severity Reaction Status Date / Time


 


No Known Allergies Allergy   Verified 03/16/18 18:11














ED Review of Systems





- Review of Systems


General/Constitutional: No fever, No chills


Skin: No rash


Head: No headache


Eyes: No pain


ENT: No nasal drainage


Neck: No neck pain


Cardio Vascular: No chest pain


Pulmonary: No SOB


GI: No nausea, No vomiting


Musculoskeletal: No bone or joint pain


Neurological: No focal symptoms





ED Past Medical History





- Past Medical History


Past Medical History: Thyroid disorder (Hypothyroidism), Dementia, Other (

PARKINSON'S, OSTEOARTHRITIS, DYSPHAGIA)


Social History: Non Smoker, No Alcohol, No Drug Use


Surgical History: None


Psychiatricy History: Bipolar, Other (SCHIZOAFFECTIVE DISORDER, PSYCHOSIS)





Family Medical History





- Family Member


  ** Mother


History Unknown: Yes


Ethnicity: Non-


Living Status: Unknown


Hx Family Cancer: No


Hx Family Coronary Artery Disease: No


Hx Family Congestive Heart Failure: No


Hx Family Hypertension: No


Hx Family Stroke: No


Hx Family Diabetes: No


Hx Family Seizures: No


Hx Family Dementia: No


Hx Family AIDS: No


Hx Family HIV: No


Hx Family COPD: No


Hx Family Hepatitis: No


Hx Family Psychiatric Problems: No


Hx Family Tuberculosis: No





ED Physical Exam





- Physical Examination


General/Constitutional: Awake, Alert


Head: Atraumatic


Eyes: PERRL, EOMI


Skin: No skin lesions


ENMT: Nasal exam nl


Neck: No nuchal rigidity


Respiratory: Clear to Auscultation, No Wheeze/Rhonchi/Rales


Cardio Vascular: RRR, No murmur, gallop, rubs, NL S1 S2


GI: No tenderness/rebounding/guarding


Extremities: normal strength in all extremities


Neuro/Psych: No focal deficits





ED Labs/Radiology/EKG Results





- Lab Results


Results: 





 Laboratory Last Values











WBC  8.5 Th/cmm (4.8-10.8)   08/27/19  20:05    


 


RBC  4.58 Mil/cmm (3.80-5.80)   08/27/19  20:05    


 


Hgb  13.7 gm/dL (12-16)   08/27/19  20:05    


 


Hct  40.3 % (41.0-60)  L  08/27/19  20:05    


 


MCV  87.9 fl (80-99)   08/27/19  20:05    


 


MCH  30.0 pg (27.0-31.0)   08/27/19  20:05    


 


MCHC Differential  34.1 pg (28.0-36.0)   08/27/19  20:05    


 


RDW  12.7 % (11.5-20.0)   08/27/19  20:05    


 


Plt Count  276 Th/cmm (150-400)   08/27/19  20:05    


 


MPV  7.3 fl  08/27/19  20:05    


 


Neutrophils %  77.4 % (40.0-80.0)   08/27/19  20:05    


 


Lymphocytes %  15.6 % (20.0-50.0)  L  08/27/19  20:05    


 


Monocytes %  6.2 % (2.0-10.0)   08/27/19  20:05    


 


Eosinophils %  0.8 % (0.0-5.0)   08/27/19  20:05    


 


Basophils %  0.0 % (0.0-2.0)   08/27/19  20:05    


 


PT  10.6 SECONDS (9.5-11.5)   08/27/19  20:05    


 


INR  1.02  (0.5-1.4)   08/27/19  20:05    


 


PTT (Actin FS)  26.5 SECONDS (26.0-38.0)   08/27/19  20:05    


 


Sodium  135 mEq/L (136-145)  L  08/27/19  20:05    


 


Potassium  4.0 mEq/L (3.5-5.1)   08/27/19  20:05    


 


Chloride  102 mEq/L ()   08/27/19  20:05    


 


Carbon Dioxide  26.0 mEq/L (21.0-31.0)   08/27/19  20:05    


 


Anion Gap  11.0  (7.0-16.0)   08/27/19  20:05    


 


BUN  17 mg/dL (7-25)   08/27/19  20:05    


 


Creatinine  0.6 mg/dL (0.7-1.3)  L  08/27/19  20:05    


 


Est GFR ( Amer)  TNP   08/27/19  20:05    


 


Est GFR (Non-Af Amer)  TNP   08/27/19  20:05    


 


BUN/Creatinine Ratio  28.3   08/27/19  20:05    


 


Glucose  115 mg/dL ()  H  08/27/19  20:05    


 


Calcium  8.9 mg/dL (8.6-10.3)   08/27/19  20:05    


 


Total Bilirubin  0.5 mg/dL (0.3-1.0)   08/27/19  20:05    


 


AST  15 U/L (13-39)   08/27/19  20:05    


 


ALT  13 U/L (7-52)   08/27/19  20:05    


 


Alkaline Phosphatase  45 U/L ()   08/27/19  20:05    


 


Troponin I  < 0.02 ng/mL (0.01-0.05)   08/27/19  20:05    


 


B-Natriuretic Peptide  54.8 pg/mL (5.0-100.0)   08/27/19  20:05    


 


Total Protein  6.5 gm/dL (6.0-8.3)   08/27/19  20:05    


 


Albumin  3.7 gm/dL (4.2-5.5)  L  08/27/19  20:05    


 


Globulin  2.8 gm/dL  08/27/19  20:05    


 


Albumin/Globulin Ratio  1.3  (1.0-1.8)   08/27/19  20:05    


 


Triglycerides  101 mg/dL (<150)   08/27/19  20:05    


 


Cholesterol  145 mg/dL (<200)   08/27/19  20:05    


 


LDL Cholesterol Direct  76 mg/dL ()   08/27/19  20:05    


 


HDL Cholesterol  63 mg/dL (23-92)   08/27/19  20:05    


 


TSH  1.14 uIU/ml (0.34-5.60)   08/27/19  20:05    


 


Urine Source  MIDSTREAM   08/27/19  20:30    


 


Urine Color  YELLOW   08/27/19  20:30    


 


Urine Clarity  CLEAR  (CLEAR)   08/27/19  20:30    


 


Urine pH  5.5  (4.6 - 8.0)   08/27/19  20:30    


 


Ur Specific Gravity  1.025  (1.005-1.030)   08/27/19  20:30    


 


Urine Protein  NEGATIVE mg/dL (NEGATIVE)   08/27/19  20:30    


 


Urine Glucose (UA)  NEGATIVE mg/dL (NEGATIVE)   08/27/19  20:30    


 


Urine Ketones  NEGATIVE mg/dL (NEGATIVE)   08/27/19  20:30    


 


Urine Blood  NEGATIVE  (NEGATIVE)   08/27/19  20:30    


 


Urine Nitrate  NEGATIVE  (NEGATIVE)   08/27/19  20:30    


 


Urine Bilirubin  NEGATIVE  (NEGATIVE)   08/27/19  20:30    


 


Urine Urobilinogen  0.2 E.U./dL (0.2 - 1.0)   08/27/19  20:30    


 


Ur Leukocyte Esterase  NEGATIVE  (NEGATIVE)   08/27/19  20:30    


 


Urine RBC  NONE SEEN /hpf (0-5)   08/27/19  20:30    


 


Urine WBC  0-2 /hpf (0-5)   08/27/19  20:30    


 


Ur Epithelial Cells  FEW /lpf (FEW)   08/27/19  20:30    


 


Urine Bacteria  FEW /hpf (NONE SEEN)   08/27/19  20:30    


 


Urine Mucus  FEW /lpf (FEW)   08/27/19  20:30    














- Radiology Results


Results: 


CXR: No focal consolidation





ED Assessment





- Assessment


General Assessment: 


Hypertension


Hypothyroidism, controlled


Parkinson's disease


Dementia


Dysphagia


Osteoarthritis


Bipolar disorder


Schizoaffective disorder


Psychosis





Assessment/Comments:: 


CBC, CMP, Troponin, BNP, TSH, UA


CXR, EKG


[facility] and [facility]





ED Septic Shock





- .


Is Septic Shock (SBP<90, OR Lactate>4 mmol\L) present?: No





ED Reassessment (Disposition)





- Reassessment


Reassessment Condition:: Unchanged





- Patient Disposition


Discharge/Transfer:: Mohinder w/in this hosp


Admitting Medical Physician:: Antony Albert


Admitting Psych Physician:: Ezra Driscoll

## 2019-08-28 LAB
CHOLEST SERPL-MCNC: 145 MG/DL (ref ?–200)
HDLC SERPL-MCNC: 63 MG/DL (ref 23–92)
TRIGL SERPL-MCNC: 101 MG/DL (ref ?–150)

## 2019-08-28 RX ADMIN — LEVOTHYROXINE SODIUM SCH MG: 75 TABLET ORAL at 08:35

## 2019-08-28 NOTE — DIAGNOSTIC IMAGING REPORT
CHEST X-RAY: AP view



INDICATION: Shortness of breath



COMPARISON: 3/16/2018



FINDINGS: Chronic lung changes are noted.  There is no focal

consolidation or pleural effusions The heart is normal in size. The

osseous structures demonstrate no acute abnormalities.



IMPRESSION: 



No focal airspace consolidation identified.

## 2019-08-28 NOTE — HISTORY & PHYSICAL
ADMIT DATE:  08/27/2019



HISTORY OF PRESENT ILLNESS:  The patient is a 74-year-old male with long history

of hypertension, hypothyroidism, degenerative joint disease, dementia, admitted

to [facility] under Dr. Driscoll's service for the treatment.  No pain, no

shortness of breath, no fever, no chills, no nausea, no vomiting.



PAST MEDICAL HISTORY:  Significant for hypothyroidism, hypertension,

degenerative joint disease, dementia, depression.



PAST SURGICAL HISTORY:  No recent surgery.



ALLERGIES:  None.



MEDICATIONS:  Follow admission reconciliation.



SOCIAL HISTORY:  No smoking, alcohol or drugs.



FAMILY HISTORY:  Noncontributory.



REVIEW OF SYSTEMS:

RENAL SYSTEM:  No history of chronic renal disorder.

CARDIOVASCULAR SYSTEM:  He has history of hypertension.

ENDOCRINE SYSTEM:  Has history of hypothyroidism.

GASTROINTESTINAL SYSTEM:  No upper or lower GI bleed.

NEUROLOGICAL SYSTEM:  No seizure disorder.

SKELETOMUSCULAR SYSTEM:  No muscular dystrophy.

HEMATOLOGICAL SYSTEM:  No bleeding tendencies.

RESPIRATORY SYSTEM:  No asthma.

GENITOURINARY SYSTEM:  No dysuria or hematuria.



PHYSICAL EXAMINATION:

GENERAL:  He is awake, mildly confused.

VITAL SIGNS:  Temperature 98.7, heart rate 45, blood pressure 134/52.

HEENT:  Normocephalic.  Pupils reacting equal to light and accommodation. 

Sclerae clear.

NECK:  Supple.  Negative for lymphadenopathy, JVD or bruit.

CHEST:  Entry of air bilaterally normal.  No rales, rhonchi or wheezing.

HEART:  S1, S2 normal.  No murmur or gallop rhythm.

ABDOMEN:  Soft, bowel sounds positive.

EXTREMITIES:  No edema.

BACK:  No vertebral.

GENITAL AND RECTAL:  Done by primary physician, no complaint.

NEUROLOGIC:  He is awake, alert, mildly confused.  Cranial nerves 2-12 is

intact.



LABORATORY DATA:  White blood cell 8.5, hemoglobin 13.7, hematocrit 40.3,

platelet is 276.  INR 1.02.  Sodium 135, potassium 4, BUN is 17, creatinine 0.6.



ASSESSMENT:

1.  Hypertension.

2.  Hypothyroidism.

3.  Degenerative joint disease.

4.  Psychosis.



PLAN:  The patient admitted to the hospital under Dr. Driscoll's service.  Medical

problems addressed during hospitalization is psychosis.  Medical problems to be

addressed at discharge are hypothyroidism, hypertension.  The patient is

medically stable for activity.



Thank you, Dr. Driscoll for asking me to see your patient.  The patient is a full

code.  The patient is going to resume his home medication.





DD: 08/28/2019 21:31

DT: 08/28/2019 22:34

[facility]# [id number]  [id number]

## 2019-08-29 LAB — HBA1C BLD-MCNC: 5.4 % (ref 4.8–5.6)

## 2019-08-29 RX ADMIN — LEVOTHYROXINE SODIUM SCH MG: 75 TABLET ORAL at 06:36

## 2019-08-29 NOTE — PSYCHIATRIC EVALUATION
DATE OF SERVICE:     08/27/2019



REASON FOR ADMISSION:  The patient was admitted for deterioration of his

psychiatric condition with increased episodes of responding to internal stimuli,

talking to himself.



HISTORY OF PRESENT ILLNESS:  The patient is a single  male 74 years old

who is well known to this writer.  The patient's last hospitalization was on

09/12/2018.  The patient has been a resident of [facility] for many

years.



On the day of admission, the staff called to request inpatient treatment because

the patient had decompensated and had increased episodes of psychosis.



Upon interview, the patient appeared to be a poor historian.  The patient was

able to give his name and date of birth, but not his age.  The patient was not

able to say where he has been living.  When asked why he is here, the patient

stated that he has to take care of the kids.  The patient stated that he has to

take the kids to [facility].  The patient reported that he is eating and sleeping

okay.  When asked if he has any episodes of hearing voices, seeing things or

feeling that people are watching him, talking about him, the patient stated

"I'm okay."  The patient was able to call this writer by name.  The patient

reported that he has been taking his medications.  The patient was disoriented

to time, place and person.  When asked who was the president, the patient stated

Aditya.



PAST PSYCHIATRIC HISTORY:  As above, the patient has long history of

schizophrenia.



PAST MEDICAL HISTORY:  Hypothyroidism, hypertension.



PAST SURGICAL HISTORY:  The patient reported that he has tonsillectomy.



ALLERGIES:  No known allergy.



CURRENT MEDICATIONS:  Tylenol, Abilify 5 mg daily, Cogentin 1 mg b.i.d.,

Depakote 500 mg daily, levothyroxine, Ativan 0.5 mg q.4 hours p.r.n., milk of

magnesia, simethicone, metoprolol, multiple vitamin, Ambien 5 mg at bedtime

p.r.n.



PERSONAL HISTORY:  The patient reported that he has never been , no

children.  The patient reported that he finished high school.  The patient

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



FAMILY HISTORY:  The patient reported that he is the older of two boys.



SUBSTANCE ABUSE HISTORY:  The patient reported that he used to drink and smoke. 

when he was younger.  The patient also admitted to using illicit drugs including

marijuana, cocaine.  The patient appeared to be responding to any type of drugs

that this writer mentioned.



MENTAL STATUS EXAM:  The patient appeared appropriate for stated age.  The

patient was cooperative and maintained good eye contact.  Speech appeared to be

somewhat slow, but otherwise able to understand him most of the time.  At times,

he was somewhat soft and had to ask him to repeat his answer.  Mood appeared to

be euthymic.  Sensorium patient was alert; however, the patient was disoriented

to time, place and person.  Memory appeared to be impaired for immediate

short-term and long-term memory.  Concentration appeared to be adequate.  The

patient was able to respond to questions even though his respond might not be

appropriate.  Short term memory, the patient was unable to repeat the 4 items

after they were given to him 3 times.  The patient was unable to repeat any of

the 4 items after a minute.  The patient was unable to give logical

interpretation to any of the 3 proverbs.  Insight poor.  Judgment impaired.



DIAGNOSTIC IMPRESSION:

AXIS I:

1.  Schizophrenia, chronic paranoid type, in acute exacerbation.

2.  Impulse control disorder, not otherwise specified.

3.  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

4.  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

AXIS II:  Deferred.

AXIS III:  Hypothyroidism, hypertension.

AXIS IV:  Medical and mental illnesses.

AXIS V:  Current 15, past year 25.



PLAN:  We will monitor the patient for another day and consider increasing his

Abilify to 10 mg p.o. daily.  The patient had been on other antipsychotic at a

higher dose in the past.  The facility has been attempting to do a dose

reduction and when that happened the patient tend to relapse.



ESTIMATED LENGTH OF STAY:  7-10 days.



DISCHARGE CRITERIA:  Include improvement of his psychosis with no recurrent

agitation or aggressive behavior and continue compliant with his care and

treatment and taking his medications.





DD: 08/28/2019 14:10

DT: 08/28/2019 20:53

JOB# [id number]  [id number]

[facility] Center[facility]ORIA[facility]

## 2019-08-29 NOTE — INTERNAL MEDICINE PROG NOTE
Internal Medicine Subjective





- Subjective


Service Date: 08/29/19


Patient seen and examined:: without staff


Patient is:: awake, non-verbal, in bed, confused


Per staff patient has:: no adverse event





Internal Medicine Objective





- Results


Result Diagrams: 


 08/27/19 20:05





 08/27/19 20:05


Recent Labs: 


 Laboratory Last Values











WBC  8.5 Th/cmm (4.8-10.8)   08/27/19  20:05    


 


RBC  4.58 Mil/cmm (3.80-5.80)   08/27/19  20:05    


 


Hgb  13.7 gm/dL (12-16)   08/27/19  20:05    


 


Hct  40.3 % (41.0-60)  L  08/27/19  20:05    


 


MCV  87.9 fl (80-99)   08/27/19  20:05    


 


MCH  30.0 pg (27.0-31.0)   08/27/19  20:05    


 


MCHC Differential  34.1 pg (28.0-36.0)   08/27/19  20:05    


 


RDW  12.7 % (11.5-20.0)   08/27/19  20:05    


 


Plt Count  276 Th/cmm (150-400)   08/27/19  20:05    


 


MPV  7.3 fl  08/27/19  20:05    


 


Neutrophils %  77.4 % (40.0-80.0)   08/27/19  20:05    


 


Lymphocytes %  15.6 % (20.0-50.0)  L  08/27/19  20:05    


 


Monocytes %  6.2 % (2.0-10.0)   08/27/19  20:05    


 


Eosinophils %  0.8 % (0.0-5.0)   08/27/19  20:05    


 


Basophils %  0.0 % (0.0-2.0)   08/27/19  20:05    


 


PT  10.6 SECONDS (9.5-11.5)   08/27/19  20:05    


 


INR  1.02  (0.5-1.4)   08/27/19  20:05    


 


PTT (Actin FS)  26.5 SECONDS (26.0-38.0)   08/27/19  20:05    


 


Sodium  135 mEq/L (136-145)  L  08/27/19  20:05    


 


Potassium  4.0 mEq/L (3.5-5.1)   08/27/19  20:05    


 


Chloride  102 mEq/L ()   08/27/19  20:05    


 


Carbon Dioxide  26.0 mEq/L (21.0-31.0)   08/27/19  20:05    


 


Anion Gap  11.0  (7.0-16.0)   08/27/19  20:05    


 


BUN  17 mg/dL (7-25)   08/27/19  20:05    


 


Creatinine  0.6 mg/dL (0.7-1.3)  L  08/27/19  20:05    


 


Est GFR ( Amer)  TNP   08/27/19  20:05    


 


Est GFR (Non-Af Amer)  TNP   08/27/19  20:05    


 


BUN/Creatinine Ratio  28.3   08/27/19  20:05    


 


Glucose  115 mg/dL ()  H  08/27/19  20:05    


 


Calcium  8.9 mg/dL (8.6-10.3)   08/27/19  20:05    


 


Total Bilirubin  0.5 mg/dL (0.3-1.0)   08/27/19  20:05    


 


AST  15 U/L (13-39)   08/27/19  20:05    


 


ALT  13 U/L (7-52)   08/27/19  20:05    


 


Alkaline Phosphatase  45 U/L ()   08/27/19  20:05    


 


Troponin I  < 0.02 ng/mL (0.01-0.05)   08/27/19  20:05    


 


B-Natriuretic Peptide  54.8 pg/mL (5.0-100.0)   08/27/19  20:05    


 


Total Protein  6.5 gm/dL (6.0-8.3)   08/27/19  20:05    


 


Albumin  3.7 gm/dL (4.2-5.5)  L  08/27/19  20:05    


 


Globulin  2.8 gm/dL  08/27/19  20:05    


 


Albumin/Globulin Ratio  1.3  (1.0-1.8)   08/27/19  20:05    


 


Triglycerides  101 mg/dL (<150)   08/27/19  20:05    


 


Cholesterol  145 mg/dL (<200)   08/27/19  20:05    


 


LDL Cholesterol Direct  76 mg/dL ()   08/27/19  20:05    


 


HDL Cholesterol  63 mg/dL (23-92)   08/27/19  20:05    


 


TSH  1.14 uIU/ml (0.34-5.60)   08/27/19  20:05    


 


Urine Source  MIDSTREAM   08/27/19  20:30    


 


Urine Color  YELLOW   08/27/19  20:30    


 


Urine Clarity  CLEAR  (CLEAR)   08/27/19  20:30    


 


Urine pH  5.5  (4.6 - 8.0)   08/27/19  20:30    


 


Ur Specific Gravity  1.025  (1.005-1.030)   08/27/19  20:30    


 


Urine Protein  NEGATIVE mg/dL (NEGATIVE)   08/27/19  20:30    


 


Urine Glucose (UA)  NEGATIVE mg/dL (NEGATIVE)   08/27/19  20:30    


 


Urine Ketones  NEGATIVE mg/dL (NEGATIVE)   08/27/19  20:30    


 


Urine Blood  NEGATIVE  (NEGATIVE)   08/27/19  20:30    


 


Urine Nitrate  NEGATIVE  (NEGATIVE)   08/27/19  20:30    


 


Urine Bilirubin  NEGATIVE  (NEGATIVE)   08/27/19  20:30    


 


Urine Urobilinogen  0.2 E.U./dL (0.2 - 1.0)   08/27/19  20:30    


 


Ur Leukocyte Esterase  NEGATIVE  (NEGATIVE)   08/27/19  20:30    


 


Urine RBC  NONE SEEN /hpf (0-5)   08/27/19  20:30    


 


Urine WBC  0-2 /hpf (0-5)   08/27/19  20:30    


 


Ur Epithelial Cells  FEW /lpf (FEW)   08/27/19  20:30    


 


Urine Bacteria  FEW /hpf (NONE SEEN)   08/27/19  20:30    


 


Urine Mucus  FEW /lpf (FEW)   08/27/19  20:30    














- Physical Exam


Vitals and I&O: 


 Vital Signs











Temp  98.6 F   08/29/19 14:00


 


Pulse  64   08/29/19 14:00


 


Resp  20   08/29/19 14:00


 


BP  134/65   08/29/19 14:00


 


Pulse Ox  95   08/29/19 14:00








 Intake & Output











 08/28/19 08/29/19 08/29/19





 18:59 06:59 18:59


 


Intake Total 1250 240 


 


Balance 1250 240 


 


Weight (lbs) 75.75 kg  


 


Intake:   


 


  Oral 1250 240 


 


Other:   


 


  # Voids  2 


 


  # Bowel Movements  0 











Active Medications: 


Current Medications





Acetaminophen (Tylenol)  650 mg PO Q4HR PRN


   PRN Reason: Mild Pain / Temp above 100


   Stop: 10/27/19 21:26


Al Hydrox/Mg Hydrox/Simethicone (Maalox)  30 ml PO Q4HR PRN


   PRN Reason: GI DISTRESS


   Stop: 10/26/19 21:51


Aripiprazole (Abilify)  7.5 mg PO HS DANYA; Protocol


   Stop: 10/28/19 20:59


Benztropine Mesylate (Cogentin)  1 mg PO BID DANYA


   Stop: 10/27/19 08:59


   Last Admin: 08/29/19 17:11 Dose:  1 mg


Divalproex Sodium (Depakote Sprinkle)  375 mg PO Q12HR DANYA; Protocol


   Stop: 10/28/19 20:59


Docusate Sodium (Colace)  100 mg PO BID DANYA


   Stop: 10/27/19 08:59


   Last Admin: 08/29/19 16:56 Dose:  Not Given


Levothyroxine Sodium (Synthroid)  0.075 mg PO QDAC DANYA


   Stop: 10/27/19 07:29


   Last Admin: 08/29/19 06:36 Dose:  0.075 mg


Lorazepam (Ativan)  0.5 mg PO Q4HR PRN; Protocol


   PRN Reason: Agitation


   Stop: 09/26/19 21:19


Magnesium Hydroxide (Milk Of Magnesia)  30 ml PO HS PRN


   PRN Reason: Constipation


Metoprolol Succinate (Toprol Xl)  50 mg PO DAILY [facility]


   Stop: 10/27/19 08:59


   Last Admin: 08/29/19 08:51 Dose:  50 mg


Multivitamins/Vitamin C (Theragran)  1 tab PO DAILY DANYA


   Stop: 10/27/19 08:59


   Last Admin: 08/29/19 08:51 Dose:  1 tab


Zolpidem Tartrate (Ambien)  5 mg PO HS PRN


   PRN Reason: Insomnia


   Stop: 10/26/19 21:51








General: demented


HEENT: NC/AT, PERRLA, EOMI, anicteric sclerae, throat clear


Neck: Supple, No JVD, No thyromegaly, +2 carotid pulse wo bruit, No LAD


Lungs: CTAB


Cardiovascular: RRR, Normal S1, Normal S2, without murmur


Abdomen: soft, non-tender, non-distended


Neurological: no change, gait stable, unable to follow command





- Procedures


Procedures: 


 Procedures











Procedure Code Date


 


GROUP PSYCHOTHERAPY [id number] 05/02/16


 


GROUP PSYCHOTHERAPY [id number] 05/02/16


 


GROUP PSYCHOTHERAPY [id number] 01/18/16


 


GROUP PSYCHOTHERAPY [id number] 01/18/16


 


INDIVID PSYCHOTHERAP NEC 94.39 05/20/08


 


OTHER GROUP THERAPY 94.44 08/27/15


 


RECREATIONAL THERAPY 93.81 01/17/13














Internal Medicine Assmt/Plan





- Assessment


Assessment: 





1.HTN.


2.HYPOTHYROIDISM


3.DJD.


4.PSYCHOSIS





- Plan


Plan: 





CONTINUE ON CURRENT MEDICATION AND DIET.

## 2019-08-29 NOTE — PROGRESS NOTES
DATE:     08/29/2019



SUBJECTIVE:  The patient was resting on his bed, alert, calm and cooperative. 

The patient reported that he is doing okay, eating and sleeping

okay.  He continued to deny any auditory or visual hallucinations.



OBJECTIVE:  The patient continued to be calm and cooperative and pleasant during

interview.  The patient continued to be confused.  The patient continued to

respond to internal stimuli, talking to himself.  The staff reported that the

patient has been doing okay and has been cooperative with care and treatment,

taking his medication as well as eating and sleeping well.



ASSESSMENT:

1.  Schizophrenia, chronic paranoid type, in acute exacerbation.

2.  Alzheimer dementia with psychosis, mood disorder, depressed and behavioral

disturbance.

3.  Impulse control disorder, not otherwise specified.

4.  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



PLAN:  We will increase the patient's Abilify to 7.5 mg p.o. at bedtime.  We

will reduce his Depakote to 375 mg p.o. q. 12 hours.  The patient had been

declining physically.  The patient has been losing weight and his gait has been

slow with small steps.  It is unclear if this is because of his aging or

possibly the effect of the psychotropic medications.  We will monitor the

patient's response to the changes in his medication.  We will consider reducing

Cogentin as well to see if that will help improve his gait.





DD: 08/29/2019 09:50

DT: 08/29/2019 20:56

[facility]# [id number]  [id number]

AYANNA

## 2019-08-30 RX ADMIN — LEVOTHYROXINE SODIUM SCH MG: 75 TABLET ORAL at 06:46

## 2019-08-30 NOTE — PROGRESS NOTES
DATE:  08/30/2019



SUBJECTIVE:  The patient was sitting in the chair in activity room.  The patient

was alert, quiet.  The patient continued to be pleasant when talked to.  The

patient reported that he has been doing okay, eating and sleeping okay.  The

patient continued to deny any auditory or visual hallucination or delusion. 

However, the patient was seen talking to himself at times.



OBJECTIVE:  The patient continued to be calm and pleasant during the interview. 

The patient did not exhibit any aggressive behavior.  The staff reported that

the patient has been doing okay, has been compliant with care and treatment,

taking his medication without any difficulty.  The patient continued to be

eating and sleeping okay.  The staff also reported that the patient continued to

respond to internal stimuli, talking to himself on and off.



ASSESSMENT:

1.  Schizophrenia, chronic paranoid type, in acute exacerbation.

2.  Alzheimer dementia with psychosis, mood disorder, depressed and behavioral

disturbance.

3.  Impulse control disorder, not otherwise specified.

4.  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



PLAN:  We will continue the patient on current plan of treatment and monitor the

patient's response to the medication as well as monitor for any adverse side

effects.





DD: 08/30/2019 10:43

DT: 08/30/2019 20:33

[facility]# [id number]  [id number]

## 2019-08-30 NOTE — INTERNAL MEDICINE PROG NOTE
Internal Medicine Subjective





- Subjective


Service Date: 08/30/19


Patient seen and examined:: without staff (HE IS CONFUSED)


Patient is:: awake, non-verbal, in bed, confused


Per staff patient has:: no adverse event





Internal Medicine Objective





- Results


Result Diagrams: 


 08/27/19 20:05





 08/27/19 20:05


Recent Labs: 


 Laboratory Last Values











WBC  8.5 Th/cmm (4.8-10.8)   08/27/19  20:05    


 


RBC  4.58 Mil/cmm (3.80-5.80)   08/27/19  20:05    


 


Hgb  13.7 gm/dL (12-16)   08/27/19  20:05    


 


Hct  40.3 % (41.0-60)  L  08/27/19  20:05    


 


MCV  87.9 fl (80-99)   08/27/19  20:05    


 


MCH  30.0 pg (27.0-31.0)   08/27/19  20:05    


 


MCHC Differential  34.1 pg (28.0-36.0)   08/27/19  20:05    


 


RDW  12.7 % (11.5-20.0)   08/27/19  20:05    


 


Plt Count  276 Th/cmm (150-400)   08/27/19  20:05    


 


MPV  7.3 fl  08/27/19  20:05    


 


Neutrophils %  77.4 % (40.0-80.0)   08/27/19  20:05    


 


Lymphocytes %  15.6 % (20.0-50.0)  L  08/27/19  20:05    


 


Monocytes %  6.2 % (2.0-10.0)   08/27/19  20:05    


 


Eosinophils %  0.8 % (0.0-5.0)   08/27/19  20:05    


 


Basophils %  0.0 % (0.0-2.0)   08/27/19  20:05    


 


PT  10.6 SECONDS (9.5-11.5)   08/27/19  20:05    


 


INR  1.02  (0.5-1.4)   08/27/19  20:05    


 


PTT (Actin FS)  26.5 SECONDS (26.0-38.0)   08/27/19  20:05    


 


Sodium  135 mEq/L (136-145)  L  08/27/19  20:05    


 


Potassium  4.0 mEq/L (3.5-5.1)   08/27/19  20:05    


 


Chloride  102 mEq/L ()   08/27/19  20:05    


 


Carbon Dioxide  26.0 mEq/L (21.0-31.0)   08/27/19  20:05    


 


Anion Gap  11.0  (7.0-16.0)   08/27/19  20:05    


 


BUN  17 mg/dL (7-25)   08/27/19  20:05    


 


Creatinine  0.6 mg/dL (0.7-1.3)  L  08/27/19  20:05    


 


Est GFR ( Amer)  TNP   08/27/19  20:05    


 


Est GFR (Non-Af Amer)  TNP   08/27/19  20:05    


 


BUN/Creatinine Ratio  28.3   08/27/19  20:05    


 


Glucose  115 mg/dL ()  H  08/27/19  20:05    


 


Calcium  8.9 mg/dL (8.6-10.3)   08/27/19  20:05    


 


Total Bilirubin  0.5 mg/dL (0.3-1.0)   08/27/19  20:05    


 


AST  15 U/L (13-39)   08/27/19  20:05    


 


ALT  13 U/L (7-52)   08/27/19  20:05    


 


Alkaline Phosphatase  45 U/L ()   08/27/19  20:05    


 


Troponin I  < 0.02 ng/mL (0.01-0.05)   08/27/19  20:05    


 


B-Natriuretic Peptide  54.8 pg/mL (5.0-100.0)   08/27/19  20:05    


 


Total Protein  6.5 gm/dL (6.0-8.3)   08/27/19  20:05    


 


Albumin  3.7 gm/dL (4.2-5.5)  L  08/27/19  20:05    


 


Globulin  2.8 gm/dL  08/27/19  20:05    


 


Albumin/Globulin Ratio  1.3  (1.0-1.8)   08/27/19  20:05    


 


Triglycerides  101 mg/dL (<150)   08/27/19  20:05    


 


Cholesterol  145 mg/dL (<200)   08/27/19  20:05    


 


LDL Cholesterol Direct  76 mg/dL ()   08/27/19  20:05    


 


HDL Cholesterol  63 mg/dL (23-92)   08/27/19  20:05    


 


TSH  1.14 uIU/ml (0.34-5.60)   08/27/19  20:05    


 


Urine Source  MIDSTREAM   08/27/19  20:30    


 


Urine Color  YELLOW   08/27/19  20:30    


 


Urine Clarity  CLEAR  (CLEAR)   08/27/19  20:30    


 


Urine pH  5.5  (4.6 - 8.0)   08/27/19  20:30    


 


Ur Specific Gravity  1.025  (1.005-1.030)   08/27/19  20:30    


 


Urine Protein  NEGATIVE mg/dL (NEGATIVE)   08/27/19  20:30    


 


Urine Glucose (UA)  NEGATIVE mg/dL (NEGATIVE)   08/27/19  20:30    


 


Urine Ketones  NEGATIVE mg/dL (NEGATIVE)   08/27/19  20:30    


 


Urine Blood  NEGATIVE  (NEGATIVE)   08/27/19  20:30    


 


Urine Nitrate  NEGATIVE  (NEGATIVE)   08/27/19  20:30    


 


Urine Bilirubin  NEGATIVE  (NEGATIVE)   08/27/19  20:30    


 


Urine Urobilinogen  0.2 E.U./dL (0.2 - 1.0)   08/27/19  20:30    


 


Ur Leukocyte Esterase  NEGATIVE  (NEGATIVE)   08/27/19  20:30    


 


Urine RBC  NONE SEEN /hpf (0-5)   08/27/19  20:30    


 


Urine WBC  0-2 /hpf (0-5)   08/27/19  20:30    


 


Ur Epithelial Cells  FEW /lpf (FEW)   08/27/19  20:30    


 


Urine Bacteria  FEW /hpf (NONE SEEN)   08/27/19  20:30    


 


Urine Mucus  FEW /lpf (FEW)   08/27/19  20:30    














- Physical Exam


Vitals and I&O: 


 Vital Signs











Temp  98.4 F   08/30/19 14:00


 


Pulse  59   08/30/19 14:00


 


Resp  20   08/30/19 14:00


 


BP  142/67   08/30/19 14:00


 


Pulse Ox  98   08/30/19 14:00








 Intake & Output











 08/29/19 08/30/19 08/30/19





 18:59 06:59 18:59


 


Intake Total 1000 120 900


 


Balance 1000 120 900


 


Intake:   


 


  Oral 1000 120 900


 


Other:   


 


  # Voids 4 3 3


 


  # Bowel Movements 1  1











Active Medications: 


Current Medications





Acetaminophen (Tylenol)  650 mg PO Q4HR PRN


   PRN Reason: Mild Pain / Temp above 100


   Stop: 10/27/19 21:26


Al Hydrox/Mg Hydrox/Simethicone (Maalox)  30 ml PO Q4HR PRN


   PRN Reason: GI DISTRESS


   Stop: 10/26/19 21:51


Aripiprazole (Abilify)  7.5 mg PO HS DANYA; Protocol


   Stop: 10/28/19 20:59


   Last Admin: 08/29/19 21:43 Dose:  7.5 mg


Benztropine Mesylate (Cogentin)  1 mg PO BID DANYA


   Stop: 10/27/19 08:59


   Last Admin: 08/30/19 17:13 Dose:  1 mg


Divalproex Sodium (Depakote Sprinkle)  375 mg PO Q12HR DANYA; Protocol


   Stop: 10/28/19 20:59


   Last Admin: 08/30/19 08:13 Dose:  375 mg


Docusate Sodium (Colace)  100 mg PO BID DANYA


   Stop: 10/27/19 08:59


   Last Admin: 08/30/19 17:13 Dose:  100 mg


Levothyroxine Sodium (Synthroid)  0.075 mg PO QDAC DANYA


   Stop: 10/27/19 07:29


   Last Admin: 08/30/19 06:46 Dose:  0.075 mg


Lorazepam (Ativan)  0.5 mg PO Q4HR PRN; Protocol


   PRN Reason: Agitation


   Stop: 09/26/19 21:19


Magnesium Hydroxide (Milk Of Magnesia)  30 ml PO HS PRN


   PRN Reason: Constipation


Metoprolol Succinate (Toprol Xl)  50 mg PO DAILY DANYA


   Stop: 10/27/19 08:59


   Last Admin: 08/30/19 08:14 Dose:  50 mg


Multivitamins/Vitamin C (Theragran)  1 tab PO DAILY DANYA


   Stop: 10/27/19 08:59


   Last Admin: 08/30/19 08:14 Dose:  1 tab


Zolpidem Tartrate (Ambien)  5 mg PO HS PRN


   PRN Reason: Insomnia


   Stop: 10/26/19 21:51


   Last Admin: 08/29/19 21:43 Dose:  5 mg








General: demented


HEENT: NC/AT, PERRLA, EOMI, anicteric sclerae, throat clear


Neck: Supple, No JVD, No thyromegaly, +2 carotid pulse wo bruit, No LAD


Lungs: CTAB


Cardiovascular: RRR, Normal S1, Normal S2, without murmur


Abdomen: soft, non-tender, non-distended


Neurological: no change, gait stable, unable to follow command





- Procedures


Procedures: 


 Procedures











Procedure Code Date


 


GROUP PSYCHOTHERAPY 56432 05/02/16


 


GROUP PSYCHOTHERAPY GZHZZZZ 05/02/16


 


GROUP PSYCHOTHERAPY [id number] 01/18/16


 


GROUP PSYCHOTHERAPY GZHZZZZ 01/18/16


 


INDIVID PSYCHOTHERAP NEC 94.39 05/20/08


 


OTHER GROUP THERAPY 94.44 08/27/15


 


RECREATIONAL THERAPY 93.81 01/17/13














Internal Medicine Assmt/Plan





- Assessment


Assessment: 





1.HTN.


2.HYPOTHYROIDISM


3.DJD.


4.PSYCHOSIS





- Plan


Plan: 





CONTINUE ON CURRENT MEDICATION AND DIET.

## 2019-08-31 RX ADMIN — LEVOTHYROXINE SODIUM SCH MG: 75 TABLET ORAL at 07:30

## 2019-08-31 NOTE — PROGRESS NOTES
DATE:  08/31/2019



PSYCHIATRIC PROGRESS NOTE



SUBJECTIVE:  Staff was spoken to.  The patient is interviewed.  Mood is noted to

be irritable.  Affect is constricted.  The patient is pacing most of the time. 

The patient has been responding to internal stimuli, not making much sense.  The

patient's coping skills at this time are noted to be very poor.  The patient has

been having acute mood swings.  The patient is going to be continued on the

valproic acid and the patient is on aripiprazole.  The patient is going to be

closely monitored and once stabilized, the patient is going to be discharged. 

The patient's sleep is noted to be poor.  Appetite is noted to be improving.



ASSESSMENT:  The patient is still psychotic.



PLAN:  To continue the patient with the supportive therapy and followup.





DD: 08/31/2019 16:35

DT: 08/31/2019 18:14

[facility]# [id number]  [id number]

## 2019-08-31 NOTE — GENERAL PROGRESS NOTE
Subjective





- Review of Systems


Service Date: 19


Subjective: 





resting comfortably


no distress





Objective





- Results


Result Diagrams: 


 19 20:05





 19 20:05


Recent Labs: 


 Laboratory Last Values











WBC  8.5 Th/cmm (4.8-10.8)   19  20:05    


 


RBC  4.58 Mil/cmm (3.80-5.80)   19  20:05    


 


Hgb  13.7 gm/dL (12-16)   19  20:05    


 


Hct  40.3 % (41.0-60)  L  19  20:05    


 


MCV  87.9 fl (80-99)   19  20:05    


 


MCH  30.0 pg (27.0-31.0)   19  20:05    


 


MCHC Differential  34.1 pg (28.0-36.0)   19  20:05    


 


RDW  12.7 % (11.5-20.0)   19  20:05    


 


Plt Count  276 Th/cmm (150-400)   19  20:05    


 


MPV  7.3 fl  19  20:05    


 


Neutrophils %  77.4 % (40.0-80.0)   19  20:05    


 


Lymphocytes %  15.6 % (20.0-50.0)  L  19  20:05    


 


Monocytes %  6.2 % (2.0-10.0)   19  20:05    


 


Eosinophils %  0.8 % (0.0-5.0)   19  20:05    


 


Basophils %  0.0 % (0.0-2.0)   19  20:05    


 


PT  10.6 SECONDS (9.5-11.5)   19  20:05    


 


INR  1.02  (0.5-1.4)   19  20:05    


 


PTT (Actin FS)  26.5 SECONDS (26.0-38.0)   19  20:05    


 


Sodium  135 mEq/L (136-145)  L  19  20:05    


 


Potassium  4.0 mEq/L (3.5-5.1)   19  20:05    


 


Chloride  102 mEq/L ()   19  20:05    


 


Carbon Dioxide  26.0 mEq/L (21.0-31.0)   19  20:05    


 


Anion Gap  11.0  (7.0-16.0)   19  20:05    


 


BUN  17 mg/dL (7-25)   19  20:05    


 


Creatinine  0.6 mg/dL (0.7-1.3)  L  19  20:05    


 


Est GFR ( Amer)  TNP   19  20:05    


 


Est GFR (Non-Af Amer)  TNP   19  20:05    


 


BUN/Creatinine Ratio  28.3   19  20:05    


 


Glucose  115 mg/dL ()  H  19  20:05    


 


Calcium  8.9 mg/dL (8.6-10.3)   19  20:05    


 


Total Bilirubin  0.5 mg/dL (0.3-1.0)   19  20:05    


 


AST  15 U/L (13-39)   19  20:05    


 


ALT  13 U/L (7-52)   19  20:05    


 


Alkaline Phosphatase  45 U/L ()   19  20:05    


 


Troponin I  < 0.02 ng/mL (0.01-0.05)   19  20:05    


 


B-Natriuretic Peptide  54.8 pg/mL (5.0-100.0)   19  20:05    


 


Total Protein  6.5 gm/dL (6.0-8.3)   19  20:05    


 


Albumin  3.7 gm/dL (4.2-5.5)  L  19  20:05    


 


Globulin  2.8 gm/dL  19  20:05    


 


Albumin/Globulin Ratio  1.3  (1.0-1.8)   19  20:05    


 


Triglycerides  101 mg/dL (<150)   19  20:05    


 


Cholesterol  145 mg/dL (<200)   19  20:05    


 


LDL Cholesterol Direct  76 mg/dL ()   19  20:05    


 


HDL Cholesterol  63 mg/dL (23-92)   19  20:05    


 


TSH  1.14 uIU/ml (0.34-5.60)   19  20:05    


 


Urine Source  MIDSTREAM   19  20:30    


 


Urine Color  YELLOW   19  20:30    


 


Urine Clarity  CLEAR  (CLEAR)   19  20:30    


 


Urine pH  5.5  (4.6 - 8.0)   19  20:30    


 


Ur Specific Gravity  1.025  (1.005-1.030)   19  20:30    


 


Urine Protein  NEGATIVE mg/dL (NEGATIVE)   19  20:30    


 


Urine Glucose (UA)  NEGATIVE mg/dL (NEGATIVE)   19  20:30    


 


Urine Ketones  NEGATIVE mg/dL (NEGATIVE)   19  20:30    


 


Urine Blood  NEGATIVE  (NEGATIVE)   19  20:30    


 


Urine Nitrate  NEGATIVE  (NEGATIVE)   19  20:30    


 


Urine Bilirubin  NEGATIVE  (NEGATIVE)   19  20:30    


 


Urine Urobilinogen  0.2 E.U./dL (0.2 - 1.0)   19  20:30    


 


Ur Leukocyte Esterase  NEGATIVE  (NEGATIVE)   19  20:30    


 


Urine RBC  NONE SEEN /hpf (0-5)   19  20:30    


 


Urine WBC  0-2 /hpf (0-5)   19  20:30    


 


Ur Epithelial Cells  FEW /lpf (FEW)   19  20:30    


 


Urine Bacteria  FEW /hpf (NONE SEEN)   19  20:30    


 


Urine Mucus  FEW /lpf (FEW)   19  20:30    














- Physical Exam


Vitals and I&O: 


 Vital Signs











Temp  96.7 F   19 15:14


 


Pulse  54   19 15:14


 


Resp  18   19 15:14


 


BP  134/56   19 15:14


 


Pulse Ox  95   19 15:14








 Intake & Output











 19





 18:59 06:59 18:59


 


Intake Total 900  


 


Balance 900  


 


Intake:   


 


  Oral 900  


 


Other:   


 


  # Voids 3  


 


  # Bowel Movements 1  











Active Medications: 


Current Medications





Acetaminophen (Tylenol)  650 mg PO Q4HR PRN


   PRN Reason: Mild Pain / Temp above 100


   Stop: 10/27/19 21:26


Al Hydrox/Mg Hydrox/Simethicone (Maalox)  30 ml PO Q4HR PRN


   PRN Reason: GI DISTRESS


   Stop: 10/26/19 21:51


Aripiprazole (Abilify)  7.5 mg PO HS DANYA; Protocol


   Stop: 10/28/19 20:59


   Last Admin: 19 20:48 Dose:  7.5 mg


Benztropine Mesylate (Cogentin)  1 mg PO BID DANYA


   Stop: 10/27/19 08:59


   Last Admin: 19 08:47 Dose:  1 mg


Divalproex Sodium (Depakote Sprinkle)  375 mg PO Q12HR DANYA; Protocol


   Stop: 10/28/19 20:59


   Last Admin: 19 08:45 Dose:  375 mg


Docusate Sodium (Colace)  100 mg PO BID DANYA


   Stop: 10/27/19 08:59


   Last Admin: 19 08:47 Dose:  100 mg


Levothyroxine Sodium (Synthroid)  0.075 mg PO QDAC DANYA


   Stop: 10/27/19 07:29


   Last Admin: 19 07:30 Dose:  0.075 mg


Lorazepam (Ativan)  0.5 mg PO Q4HR PRN; Protocol


   PRN Reason: Agitation


   Stop: 19 21:19


   Last Admin: 19 08:47 Dose:  0.5 mg


Magnesium Hydroxide (Milk Of Magnesia)  30 ml PO HS PRN


   PRN Reason: Constipation


Metoprolol Succinate (Toprol Xl)  50 mg PO DAILY DANYA


   Stop: 10/27/19 08:59


   Last Admin: 19 08:47 Dose:  50 mg


Multivitamins/Vitamin C (Theragran)  1 tab PO DAILY DANYA


   Stop: 10/27/19 08:59


   Last Admin: 19 08:48 Dose:  1 tab


Zolpidem Tartrate (Ambien)  5 mg PO HS PRN


   PRN Reason: Insomnia


   Stop: 10/26/19 21:51


   Last Admin: 19 20:49 Dose:  5 mg








General: No acute distress


HEENT: PERRLA


Neck: Supple, JVD


Cardiovascular: Regular rate, Normal S1, Normal S2


Lungs: Clear to auscultation


Abdomen: Bowel sounds, Soft





- Procedures


Procedures: 


 Procedures











Procedure Code Date


 


GROUP PSYCHOTHERAPY 54005 16


 


GROUP PSYCHOTHERAPY GZHZZZZ 16


 


GROUP PSYCHOTHERAPY 65275 16


 


GROUP PSYCHOTHERAPY GZHZZZZ 16


 


INDIVID PSYCHOTHERAP NEC 94.39 08


 


OTHER GROUP THERAPY 94.44 08/27/15


 


RECREATIONAL THERAPY 93.81 13














Assessment/Plan





- Assessment


Assessment: 





1.HTN.


2.HYPOTHYROIDISM


3.DJD.


4.PSYCHOSIS





- Plan


Plan: 





continue current treatment





Nutritional Asmnt/Malnutr-PDOC





- Dietary Evaluation


Malnutrition Findings (Please click <Entered> for more info): 








Nutritional Asmnt/Malnutrition                             Start:  19 11:

33


Text:                                                      Status: Complete    

  


Freq:                                                                          

  


Protocol:                                                                      

  


 Document     19 11:33  MMULHERN  (Rec: 19 11:50  MMULHERN  [place]-

FNS1)


 Nutritional Asmnt/Malnutrition


     Patient General Information


      Nutritional Screening                      Low Risk


      Diagnosis                                  Psychosis


      Pertinent Medical Hx/Surgical Hx           Hypothyroidism, hypertension,


                                                 degenerative joint disease,


                                                 dementia, depression


      Subjective Information                     Patient was admitted from


                                                 nursing home with increased


                                                 agitation and aggressiveness.


                                                 Tolerating current diet with


                                                 adequate intake.


      Current Diet Order/ Nutrition Support      Regular


      Patient / S.O                              Not Indicated


      Pertinent Medications                      Maalox, Colace, Synthroid, MOM


                                                 , Theragran


      Pertinent Labs                             () Albumin 3.7


     Nutritional Hx/Data


      Height                                     1.8 m


      Height (Calculated Centimeters)            180.3


      Current Weight (lbs)                       75.75 kg


      Weight (Calculated Kilograms)              75.7


      Weight (Calculated Grams)                  75657.9


      Ideal Body Weight                          172


      % Ideal Body Weight                        97


      Body Mass Index (BMI)                      23.3


      Recent Weight Change                       No


      Weight Status                              Approriate


     GI Symptoms


      GI Symptoms                                None


      Last BM                                    8/31 x 1


      Difficult in:                              None


      Food Allergies                             No


      Cultural/Ethnic/Congregation Belief           none indicated


      Usual diet at home                         unknown


      Skin Integrity/Comment:                    Thom 22, Intact


      Current %PO                                Good (%)


     Estimated Nutritional Goals


      BEE in Kcals:                              Using Current wt


      Calories/Kcals/Kg                          CBW 76.8kg 25-30 kcal/kg


      Kcals Calculated                           ~2884-9376 kcal/day


      Protein:                                   Using Current wt


      Protein g/k-1.2 gm/kg


      Protein Calculated                         ~75-90 gm/day


      Fluid: ml                                  ~9401-8656 ml/day (1 ml/kcal)


     Nutritional Problem


      No current Nutrition Prob


       Problem                                   No nutrition diagnosis at this


                                                 time


     Intervention/Recommendation


      Comments                                   1. Continue regular diet as


                                                 tolerated by patient.


     Expected Outcomes/Goals


      Expected Outcomes/Goals                    Oral intake >75% of meals,


                                                 weight stable, nutrition


                                                 related labs WNL


                                                 F/U LR

## 2019-09-01 RX ADMIN — LEVOTHYROXINE SODIUM SCH MG: 75 TABLET ORAL at 06:35

## 2019-09-01 NOTE — PROGRESS NOTES
DATE:  09/01/2019



SUBJECTIVE:  Staff was spoken to.  The patient is interviewed.  The patient has

been actively responding to internal stimuli, pacing on the unit.  The patient

is also acting inappropriate at times and has been trying to masturbate.  The

patient's cognition are noted to be very poor.  The patient is currently on

valproic acid and aripiprazole and has been able to tolerate.



ASSESSMENT:  The patient is still grossly psychotic.



PLAN:  To continue the patient with the supportive therapy and followup.





DD: 09/01/2019 13:14

DT: 09/01/2019 13:25

[facility]# [id number]  [id number]

## 2019-09-01 NOTE — GENERAL PROGRESS NOTE
Subjective





- Review of Systems


Service Date: 19


Subjective: 





resting comfortably


no distress


staff noted unusual gait





Objective





- Results


Result Diagrams: 


 19 20:05





 19 20:05


Recent Labs: 


 Laboratory Last Values











WBC  8.5 Th/cmm (4.8-10.8)   19  20:05    


 


RBC  4.58 Mil/cmm (3.80-5.80)   19  20:05    


 


Hgb  13.7 gm/dL (12-16)   19  20:05    


 


Hct  40.3 % (41.0-60)  L  19  20:05    


 


MCV  87.9 fl (80-99)   19  20:05    


 


MCH  30.0 pg (27.0-31.0)   19  20:05    


 


MCHC Differential  34.1 pg (28.0-36.0)   19  20:05    


 


RDW  12.7 % (11.5-20.0)   19  20:05    


 


Plt Count  276 Th/cmm (150-400)   19  20:05    


 


MPV  7.3 fl  19  20:05    


 


Neutrophils %  77.4 % (40.0-80.0)   19  20:05    


 


Lymphocytes %  15.6 % (20.0-50.0)  L  19  20:05    


 


Monocytes %  6.2 % (2.0-10.0)   19  20:05    


 


Eosinophils %  0.8 % (0.0-5.0)   19  20:05    


 


Basophils %  0.0 % (0.0-2.0)   19  20:05    


 


PT  10.6 SECONDS (9.5-11.5)   19  20:05    


 


INR  1.02  (0.5-1.4)   19  20:05    


 


PTT (Actin FS)  26.5 SECONDS (26.0-38.0)   19  20:05    


 


Sodium  135 mEq/L (136-145)  L  19  20:05    


 


Potassium  4.0 mEq/L (3.5-5.1)   19  20:05    


 


Chloride  102 mEq/L ()   19  20:05    


 


Carbon Dioxide  26.0 mEq/L (21.0-31.0)   19  20:05    


 


Anion Gap  11.0  (7.0-16.0)   19  20:05    


 


BUN  17 mg/dL (7-25)   19  20:05    


 


Creatinine  0.6 mg/dL (0.7-1.3)  L  19  20:05    


 


Est GFR ( Amer)  TNP   19  20:05    


 


Est GFR (Non-Af Amer)  TNP   19  20:05    


 


BUN/Creatinine Ratio  28.3   19  20:05    


 


Glucose  115 mg/dL ()  H  19  20:05    


 


Calcium  8.9 mg/dL (8.6-10.3)   19  20:05    


 


Total Bilirubin  0.5 mg/dL (0.3-1.0)   19  20:05    


 


AST  15 U/L (13-39)   19  20:05    


 


ALT  13 U/L (7-52)   19  20:05    


 


Alkaline Phosphatase  45 U/L ()   19  20:05    


 


Troponin I  < 0.02 ng/mL (0.01-0.05)   19  20:05    


 


B-Natriuretic Peptide  54.8 pg/mL (5.0-100.0)   19  20:05    


 


Total Protein  6.5 gm/dL (6.0-8.3)   19  20:05    


 


Albumin  3.7 gm/dL (4.2-5.5)  L  19  20:05    


 


Globulin  2.8 gm/dL  19  20:05    


 


Albumin/Globulin Ratio  1.3  (1.0-1.8)   19  20:05    


 


Triglycerides  101 mg/dL (<150)   19  20:05    


 


Cholesterol  145 mg/dL (<200)   19  20:05    


 


LDL Cholesterol Direct  76 mg/dL ()   19  20:05    


 


HDL Cholesterol  63 mg/dL (23-92)   19  20:05    


 


TSH  1.14 uIU/ml (0.34-5.60)   19  20:05    


 


Urine Source  MIDSTREAM   19  20:30    


 


Urine Color  YELLOW   19  20:30    


 


Urine Clarity  CLEAR  (CLEAR)   19  20:30    


 


Urine pH  5.5  (4.6 - 8.0)   19  20:30    


 


Ur Specific Gravity  1.025  (1.005-1.030)   19  20:30    


 


Urine Protein  NEGATIVE mg/dL (NEGATIVE)   19  20:30    


 


Urine Glucose (UA)  NEGATIVE mg/dL (NEGATIVE)   19  20:30    


 


Urine Ketones  NEGATIVE mg/dL (NEGATIVE)   19  20:30    


 


Urine Blood  NEGATIVE  (NEGATIVE)   19  20:30    


 


Urine Nitrate  NEGATIVE  (NEGATIVE)   19  20:30    


 


Urine Bilirubin  NEGATIVE  (NEGATIVE)   19  20:30    


 


Urine Urobilinogen  0.2 E.U./dL (0.2 - 1.0)   19  20:30    


 


Ur Leukocyte Esterase  NEGATIVE  (NEGATIVE)   19  20:30    


 


Urine RBC  NONE SEEN /hpf (0-5)   19  20:30    


 


Urine WBC  0-2 /hpf (0-5)   19  20:30    


 


Ur Epithelial Cells  FEW /lpf (FEW)   19  20:30    


 


Urine Bacteria  FEW /hpf (NONE SEEN)   19  20:30    


 


Urine Mucus  FEW /lpf (FEW)   19  20:30    














- Physical Exam


Vitals and I&O: 


 Vital Signs











Temp  97.8 F   19 06:26


 


Pulse  56   19 08:38


 


Resp  18   19 08:00


 


BP  140/66   19 08:38


 


Pulse Ox  98   19 06:26








 Intake & Output











 19





 18:59 06:59 18:59


 


Intake Total 950 840 


 


Balance 950 840 


 


Intake:   


 


  Oral 950 840 


 


Other:   


 


  # Voids 3 2 


 


  # Bowel Movements 1  











Active Medications: 


Current Medications





Acetaminophen (Tylenol)  650 mg PO Q4HR PRN


   PRN Reason: Mild Pain / Temp above 100


   Stop: 10/27/19 21:26


Al Hydrox/Mg Hydrox/Simethicone (Maalox)  30 ml PO Q4HR PRN


   PRN Reason: GI DISTRESS


   Stop: 10/26/19 21:51


Aripiprazole (Abilify)  7.5 mg PO HS DANYA; Protocol


   Stop: 10/28/19 20:59


   Last Admin: 19 21:22 Dose:  7.5 mg


Benztropine Mesylate (Cogentin)  1 mg PO BID DANYA


   Stop: 10/27/19 08:59


   Last Admin: 19 08:38 Dose:  1 mg


Divalproex Sodium (Depakote Sprinkle)  375 mg PO Q12HR DANYA; Protocol


   Stop: 10/28/19 20:59


   Last Admin: 19 08:37 Dose:  375 mg


Docusate Sodium (Colace)  100 mg PO BID DANYA


   Stop: 10/27/19 08:59


   Last Admin: 19 08:38 Dose:  100 mg


Levothyroxine Sodium (Synthroid)  0.075 mg PO QDAC DANYA


   Stop: 10/27/19 07:29


   Last Admin: 19 06:35 Dose:  0.075 mg


Lorazepam (Ativan)  0.5 mg PO Q4HR PRN; Protocol


   PRN Reason: Agitation


   Stop: 19 21:19


   Last Admin: 19 08:47 Dose:  0.5 mg


Magnesium Hydroxide (Milk Of Magnesia)  30 ml PO HS PRN


   PRN Reason: Constipation


Metoprolol Succinate (Toprol Xl)  50 mg PO DAILY DANYA


   Stop: 10/27/19 08:59


   Last Admin: 19 08:38 Dose:  50 mg


Multivitamins/Vitamin C (Theragran)  1 tab PO DAILY DANYA


   Stop: 10/27/19 08:59


   Last Admin: 19 08:38 Dose:  1 tab


Zolpidem Tartrate (Ambien)  5 mg PO HS PRN


   PRN Reason: Insomnia


   Stop: 10/26/19 21:51


   Last Admin: 19 21:22 Dose:  5 mg








General: No acute distress


HEENT: PERRLA


Neck: Supple, JVD


Cardiovascular: Regular rate, Normal S1, Normal S2


Lungs: Clear to auscultation


Abdomen: Bowel sounds, Soft





- Procedures


Procedures: 


 Procedures











Procedure Code Date


 


GROUP PSYCHOTHERAPY 38516 16


 


GROUP PSYCHOTHERAPY GZHZZZZ 16


 


GROUP PSYCHOTHERAPY 46591 16


 


GROUP PSYCHOTHERAPY GZHZZZZ 16


 


INDIVID PSYCHOTHERAP NEC 94.39 08


 


OTHER GROUP THERAPY 94.44 08/27/15


 


RECREATIONAL THERAPY 93.81 13














Assessment/Plan





- Assessment


Assessment: 





1.HTN.


2.HYPOTHYROIDISM


3.DJD.


4.PSYCHOSIS





- Plan


Plan: 





continue current treatment


instructed to soak callous in right foot


will need podiatrist upon discharge





Nutritional Asmnt/Malnutr-PDOC





- Dietary Evaluation


Malnutrition Findings (Please click <Entered> for more info): 








Nutritional Asmnt/Malnutrition                             Start:  19 11:

33


Text:                                                      Status: Complete    

  


Freq:                                                                          

  


Protocol:                                                                      

  


 Document     19 11:33  ELE  (Rec: 19 11:50  MMNEEMA COLE[place]-

FNS1)


 Nutritional Asmnt/Malnutrition


     Patient General Information


      Nutritional Screening                      Low Risk


      Diagnosis                                  Psychosis


      Pertinent Medical Hx/Surgical Hx           Hypothyroidism, hypertension,


                                                 degenerative joint disease,


                                                 dementia, depression


      Subjective Information                     Patient was admitted from


                                                 nursing home with increased


                                                 agitation and aggressiveness.


                                                 Tolerating current diet with


                                                 adequate intake.


      Current Diet Order/ Nutrition Support      Regular


      Patient / S.O                              Not Indicated


      Pertinent Medications                      Maalox, Colace, Synthroid, MOM


                                                 , Theragran


      Pertinent Labs                             () Albumin 3.7


     Nutritional Hx/Data


      Height                                     1.8 m


      Height (Calculated Centimeters)            180.3


      Current Weight (lbs)                       75.75 kg


      Weight (Calculated Kilograms)              75.7


      Weight (Calculated Grams)                  18899.9


      Ideal Body Weight                          172


      % Ideal Body Weight                        97


      Body Mass Index (BMI)                      23.3


      Recent Weight Change                       No


      Weight Status                              Approriate


     GI Symptoms


      GI Symptoms                                None


      Last BM                                    8/31 x 1


      Difficult in:                              None


      Food Allergies                             No


      Cultural/Ethnic/Amish Belief           none indicated


      Usual diet at home                         unknown


      Skin Integrity/Comment:                    Thom 22, Intact


      Current %PO                                Good (%)


     Estimated Nutritional Goals


      BEE in Kcals:                              Using Current wt


      Calories/Kcals/Kg                          CBW 76.8kg 25-30 kcal/kg


      Kcals Calculated                           ~4768-7088 kcal/day


      Protein:                                   Using Current wt


      Protein g/k-1.2 gm/kg


      Protein Calculated                         ~75-90 gm/day


      Fluid: ml                                  ~1747-2837 ml/day (1 ml/kcal)


     Nutritional Problem


      No current Nutrition Prob


       Problem                                   No nutrition diagnosis at this


                                                 time


     Intervention/Recommendation


      Comments                                   1. Continue regular diet as


                                                 tolerated by patient.


     Expected Outcomes/Goals


      Expected Outcomes/Goals                    Oral intake >75% of meals,


                                                 weight stable, nutrition


                                                 related labs WNL


                                                 F/U LR

## 2019-09-02 RX ADMIN — LEVOTHYROXINE SODIUM SCH MG: 75 TABLET ORAL at 06:56

## 2019-09-02 NOTE — GENERAL PROGRESS NOTE
Subjective





- Review of Systems


Service Date: 19


Subjective: 





resting comfortably


no distress





Objective





- Results


Result Diagrams: 


 19 20:05





 19 20:05


Recent Labs: 


 Laboratory Last Values











WBC  8.5 Th/cmm (4.8-10.8)   19  20:05    


 


RBC  4.58 Mil/cmm (3.80-5.80)   19  20:05    


 


Hgb  13.7 gm/dL (12-16)   19  20:05    


 


Hct  40.3 % (41.0-60)  L  19  20:05    


 


MCV  87.9 fl (80-99)   19  20:05    


 


MCH  30.0 pg (27.0-31.0)   19  20:05    


 


MCHC Differential  34.1 pg (28.0-36.0)   19  20:05    


 


RDW  12.7 % (11.5-20.0)   19  20:05    


 


Plt Count  276 Th/cmm (150-400)   19  20:05    


 


MPV  7.3 fl  19  20:05    


 


Neutrophils %  77.4 % (40.0-80.0)   19  20:05    


 


Lymphocytes %  15.6 % (20.0-50.0)  L  19  20:05    


 


Monocytes %  6.2 % (2.0-10.0)   19  20:05    


 


Eosinophils %  0.8 % (0.0-5.0)   19  20:05    


 


Basophils %  0.0 % (0.0-2.0)   19  20:05    


 


PT  10.6 SECONDS (9.5-11.5)   19  20:05    


 


INR  1.02  (0.5-1.4)   19  20:05    


 


PTT (Actin FS)  26.5 SECONDS (26.0-38.0)   19  20:05    


 


Sodium  135 mEq/L (136-145)  L  19  20:05    


 


Potassium  4.0 mEq/L (3.5-5.1)   19  20:05    


 


Chloride  102 mEq/L ()   19  20:05    


 


Carbon Dioxide  26.0 mEq/L (21.0-31.0)   19  20:05    


 


Anion Gap  11.0  (7.0-16.0)   19  20:05    


 


BUN  17 mg/dL (7-25)   19  20:05    


 


Creatinine  0.6 mg/dL (0.7-1.3)  L  19  20:05    


 


Est GFR ( Amer)  TNP   19  20:05    


 


Est GFR (Non-Af Amer)  TNP   19  20:05    


 


BUN/Creatinine Ratio  28.3   19  20:05    


 


Glucose  115 mg/dL ()  H  19  20:05    


 


Calcium  8.9 mg/dL (8.6-10.3)   19  20:05    


 


Total Bilirubin  0.5 mg/dL (0.3-1.0)   19  20:05    


 


AST  15 U/L (13-39)   19  20:05    


 


ALT  13 U/L (7-52)   19  20:05    


 


Alkaline Phosphatase  45 U/L ()   19  20:05    


 


Troponin I  < 0.02 ng/mL (0.01-0.05)   19  20:05    


 


B-Natriuretic Peptide  54.8 pg/mL (5.0-100.0)   19  20:05    


 


Total Protein  6.5 gm/dL (6.0-8.3)   19  20:05    


 


Albumin  3.7 gm/dL (4.2-5.5)  L  19  20:05    


 


Globulin  2.8 gm/dL  19  20:05    


 


Albumin/Globulin Ratio  1.3  (1.0-1.8)   19  20:05    


 


Triglycerides  101 mg/dL (<150)   19  20:05    


 


Cholesterol  145 mg/dL (<200)   19  20:05    


 


LDL Cholesterol Direct  76 mg/dL ()   19  20:05    


 


HDL Cholesterol  63 mg/dL (23-92)   19  20:05    


 


TSH  1.14 uIU/ml (0.34-5.60)   19  20:05    


 


Urine Source  MIDSTREAM   19  20:30    


 


Urine Color  YELLOW   19  20:30    


 


Urine Clarity  CLEAR  (CLEAR)   19  20:30    


 


Urine pH  5.5  (4.6 - 8.0)   19  20:30    


 


Ur Specific Gravity  1.025  (1.005-1.030)   19  20:30    


 


Urine Protein  NEGATIVE mg/dL (NEGATIVE)   19  20:30    


 


Urine Glucose (UA)  NEGATIVE mg/dL (NEGATIVE)   19  20:30    


 


Urine Ketones  NEGATIVE mg/dL (NEGATIVE)   19  20:30    


 


Urine Blood  NEGATIVE  (NEGATIVE)   19  20:30    


 


Urine Nitrate  NEGATIVE  (NEGATIVE)   19  20:30    


 


Urine Bilirubin  NEGATIVE  (NEGATIVE)   19  20:30    


 


Urine Urobilinogen  0.2 E.U./dL (0.2 - 1.0)   19  20:30    


 


Ur Leukocyte Esterase  NEGATIVE  (NEGATIVE)   19  20:30    


 


Urine RBC  NONE SEEN /hpf (0-5)   19  20:30    


 


Urine WBC  0-2 /hpf (0-5)   19  20:30    


 


Ur Epithelial Cells  FEW /lpf (FEW)   19  20:30    


 


Urine Bacteria  FEW /hpf (NONE SEEN)   19  20:30    


 


Urine Mucus  FEW /lpf (FEW)   19  20:30    














- Physical Exam


Vitals and I&O: 


 Vital Signs











Temp  97.8 F   19 06:12


 


Pulse  55   19 08:19


 


Resp  18   19 08:00


 


BP  138/57   19 08:19


 


Pulse Ox  97   19 06:12








 Intake & Output











 19





 18:59 06:59 18:59


 


Intake Total 800 720 


 


Balance 800 720 


 


Intake:   


 


  Oral 800 720 


 


Other:   


 


  # Voids 3 2 


 


  # Bowel Movements 1  











Active Medications: 


Current Medications





Acetaminophen (Tylenol)  650 mg PO Q4HR PRN


   PRN Reason: Mild Pain / Temp above 100


   Stop: 10/27/19 21:26


Al Hydrox/Mg Hydrox/Simethicone (Maalox)  30 ml PO Q4HR PRN


   PRN Reason: GI DISTRESS


   Stop: 10/26/19 21:51


Aripiprazole (Abilify)  10 mg PO HS DANYA; Protocol


   Stop: 19 20:59


Benztropine Mesylate (Cogentin)  1 mg PO BID DANYA


   Stop: 10/27/19 08:59


   Last Admin: 19 08:22 Dose:  1 mg


Divalproex Sodium (Depakote Sprinkle)  375 mg PO Q12HR DANYA; Protocol


   Stop: 10/28/19 20:59


   Last Admin: 19 08:22 Dose:  375 mg


Docusate Sodium (Colace)  100 mg PO BID DANYA


   Stop: 10/27/19 08:59


   Last Admin: 19 08:22 Dose:  100 mg


Levothyroxine Sodium (Synthroid)  0.075 mg PO QDAC DANYA


   Stop: 10/27/19 07:29


   Last Admin: 19 06:56 Dose:  0.075 mg


Lorazepam (Ativan)  0.5 mg PO Q4HR PRN; Protocol


   PRN Reason: Agitation


   Stop: 19 21:19


   Last Admin: 19 08:47 Dose:  0.5 mg


Magnesium Hydroxide (Milk Of Magnesia)  30 ml PO HS PRN


   PRN Reason: Constipation


Metoprolol Succinate (Toprol Xl)  50 mg PO DAILY DANYA


   Stop: 10/27/19 08:59


   Last Admin: 19 08:19 Dose:  Not Given


Multivitamins/Vitamin C (Theragran)  1 tab PO DAILY DANYA


   Stop: 10/27/19 08:59


   Last Admin: 19 08:24 Dose:  1 tab


Zolpidem Tartrate (Ambien)  5 mg PO HS PRN


   PRN Reason: Insomnia


   Stop: 10/26/19 21:51


   Last Admin: 19 20:50 Dose:  5 mg








General: No acute distress


HEENT: PERRLA


Neck: Supple, JVD


Cardiovascular: Regular rate, Normal S1, Normal S2


Lungs: Clear to auscultation


Abdomen: Bowel sounds, Soft





- Procedures


Procedures: 


 Procedures











Procedure Code Date


 


GROUP PSYCHOTHERAPY 91869 16


 


GROUP PSYCHOTHERAPY GZHZZZZ 16


 


GROUP PSYCHOTHERAPY 11057 16


 


GROUP PSYCHOTHERAPY GZHZZZZ 16


 


INDIVID PSYCHOTHERAP NEC 94.39 08


 


OTHER GROUP THERAPY 94.44 08/27/15


 


RECREATIONAL THERAPY 93.81 13














Assessment/Plan





- Assessment


Assessment: 





1.HTN.


2.HYPOTHYROIDISM


3.DJD.


4.PSYCHOSIS





- Plan


Plan: 





continue current treatment





Nutritional Asmnt/Malnutr-PDOC





- Dietary Evaluation


Malnutrition Findings (Please click <Entered> for more info): 








Nutritional Asmnt/Malnutrition                             Start:  19 11:

33


Text:                                                      Status: Complete    

  


Freq:                                                                          

  


Protocol:                                                                      

  


 Document     19 11:33  ELE  (Rec: 19 11:50  MMNEEMA  [place]-

FNS1)


 Nutritional Asmnt/Malnutrition


     Patient General Information


      Nutritional Screening                      Low Risk


      Diagnosis                                  Psychosis


      Pertinent Medical Hx/Surgical Hx           Hypothyroidism, hypertension,


                                                 degenerative joint disease,


                                                 dementia, depression


      Subjective Information                     Patient was admitted from


                                                 nursing home with increased


                                                 agitation and aggressiveness.


                                                 Tolerating current diet with


                                                 adequate intake.


      Current Diet Order/ Nutrition Support      Regular


      Patient / S.O                              Not Indicated


      Pertinent Medications                      Maalox, Colace, Synthroid, MOM


                                                 , Theragran


      Pertinent Labs                             () Albumin 3.7


     Nutritional Hx/Data


      Height                                     1.8 m


      Height (Calculated Centimeters)            180.3


      Current Weight (lbs)                       75.75 kg


      Weight (Calculated Kilograms)              75.7


      Weight (Calculated Grams)                  01118.9


      Ideal Body Weight                          172


      % Ideal Body Weight                        97


      Body Mass Index (BMI)                      23.3


      Recent Weight Change                       No


      Weight Status                              Approriate


     GI Symptoms


      GI Symptoms                                None


      Last BM                                    8/31 x 1


      Difficult in:                              None


      Food Allergies                             No


      Cultural/Ethnic/Yazdanism Belief           none indicated


      Usual diet at home                         unknown


      Skin Integrity/Comment:                    Thom 22, Intact


      Current %PO                                Good (%)


     Estimated Nutritional Goals


      BEE in Kcals:                              Using Current wt


      Calories/Kcals/Kg                          CBW 76.8kg 25-30 kcal/kg


      Kcals Calculated                           ~9883-4645 kcal/day


      Protein:                                   Using Current wt


      Protein g/k-1.2 gm/kg


      Protein Calculated                         ~75-90 gm/day


      Fluid: ml                                  ~9654-4855 ml/day (1 ml/kcal)


     Nutritional Problem


      No current Nutrition Prob


       Problem                                   No nutrition diagnosis at this


                                                 time


     Intervention/Recommendation


      Comments                                   1. Continue regular diet as


                                                 tolerated by patient.


     Expected Outcomes/Goals


      Expected Outcomes/Goals                    Oral intake >75% of meals,


                                                 weight stable, nutrition


                                                 related labs WNL


                                                 F/U LR

## 2019-09-02 NOTE — PROGRESS NOTES
DATE:  09/02/2019



SUBJECTIVE:  Staff was spoken to.  The patient is interviewed.  Mood is noted to

be irritable.  Affect is constricted.  The patient is still very paranoid,

pacing most of the time on the unit.  The patient is difficult to engage.  The

patient has been walking away from me when I am trying to talk to him.  The

patient's sleep is noted to be poor.  Appetite is also noted to be very poor. 

The patient is currently on the Abilify, which is going to be increased to 10 mg

from 7.5 and the patient is going to be followed up with the supportive therapy.

 The patient is not ready to be discharged to a lower level of care yet.





DD: 09/02/2019 11:57

DT: 09/02/2019 16:53

[facility]# [id number]  [id number]

## 2019-09-03 RX ADMIN — LEVOTHYROXINE SODIUM SCH MG: 75 TABLET ORAL at 06:48

## 2019-09-03 NOTE — INTERNAL MEDICINE PROG NOTE
Internal Medicine Subjective





- Subjective


Service Date: 19


Patient seen and examined:: without staff (HE IS CONFUSED)


Patient is:: awake, non-verbal, in bed, confused


Per staff patient has:: no adverse event





Internal Medicine Objective





- Results


Result Diagrams: 


 19 20:05





 19 20:05


Recent Labs: 


 Laboratory Last Values











WBC  8.5 Th/cmm (4.8-10.8)   19  20:05    


 


RBC  4.58 Mil/cmm (3.80-5.80)   19  20:05    


 


Hgb  13.7 gm/dL (12-16)   19  20:05    


 


Hct  40.3 % (41.0-60)  L  19  20:05    


 


MCV  87.9 fl (80-99)   19  20:05    


 


MCH  30.0 pg (27.0-31.0)   19  20:05    


 


MCHC Differential  34.1 pg (28.0-36.0)   19  20:05    


 


RDW  12.7 % (11.5-20.0)   19  20:05    


 


Plt Count  276 Th/cmm (150-400)   19  20:05    


 


MPV  7.3 fl  19  20:05    


 


Neutrophils %  77.4 % (40.0-80.0)   19  20:05    


 


Lymphocytes %  15.6 % (20.0-50.0)  L  19  20:05    


 


Monocytes %  6.2 % (2.0-10.0)   19  20:05    


 


Eosinophils %  0.8 % (0.0-5.0)   19  20:05    


 


Basophils %  0.0 % (0.0-2.0)   19  20:05    


 


PT  10.6 SECONDS (9.5-11.5)   19  20:05    


 


INR  1.02  (0.5-1.4)   19  20:05    


 


PTT (Actin FS)  26.5 SECONDS (26.0-38.0)   19  20:05    


 


Sodium  135 mEq/L (136-145)  L  19  20:05    


 


Potassium  4.0 mEq/L (3.5-5.1)   19  20:05    


 


Chloride  102 mEq/L ()   19  20:05    


 


Carbon Dioxide  26.0 mEq/L (21.0-31.0)   19  20:05    


 


Anion Gap  11.0  (7.0-16.0)   19  20:05    


 


BUN  17 mg/dL (7-25)   19  20:05    


 


Creatinine  0.6 mg/dL (0.7-1.3)  L  19  20:05    


 


Est GFR ( Amer)  TNP   19  20:05    


 


Est GFR (Non-Af Amer)  TNP   19  20:05    


 


BUN/Creatinine Ratio  28.3   19  20:05    


 


Glucose  115 mg/dL ()  H  19  20:05    


 


Calcium  8.9 mg/dL (8.6-10.3)   19  20:05    


 


Total Bilirubin  0.5 mg/dL (0.3-1.0)   19  20:05    


 


AST  15 U/L (13-39)   19  20:05    


 


ALT  13 U/L (7-52)   19  20:05    


 


Alkaline Phosphatase  45 U/L ()   19  20:05    


 


Troponin I  < 0.02 ng/mL (0.01-0.05)   19  20:05    


 


B-Natriuretic Peptide  54.8 pg/mL (5.0-100.0)   19  20:05    


 


Total Protein  6.5 gm/dL (6.0-8.3)   19  20:05    


 


Albumin  3.7 gm/dL (4.2-5.5)  L  19  20:05    


 


Globulin  2.8 gm/dL  19  20:05    


 


Albumin/Globulin Ratio  1.3  (1.0-1.8)   19  20:05    


 


Triglycerides  101 mg/dL (<150)   19  20:05    


 


Cholesterol  145 mg/dL (<200)   19  20:05    


 


LDL Cholesterol Direct  76 mg/dL ()   19  20:05    


 


HDL Cholesterol  63 mg/dL (23-92)   19  20:05    


 


TSH  1.14 uIU/ml (0.34-5.60)   19  20:05    


 


Urine Source  MIDSTREAM   19  20:30    


 


Urine Color  YELLOW   19  20:30    


 


Urine Clarity  CLEAR  (CLEAR)   19  20:30    


 


Urine pH  5.5  (4.6 - 8.0)   19  20:30    


 


Ur Specific Gravity  1.025  (1.005-1.030)   19  20:30    


 


Urine Protein  NEGATIVE mg/dL (NEGATIVE)   19  20:30    


 


Urine Glucose (UA)  NEGATIVE mg/dL (NEGATIVE)   19  20:30    


 


Urine Ketones  NEGATIVE mg/dL (NEGATIVE)   19  20:30    


 


Urine Blood  NEGATIVE  (NEGATIVE)   19  20:30    


 


Urine Nitrate  NEGATIVE  (NEGATIVE)   19  20:30    


 


Urine Bilirubin  NEGATIVE  (NEGATIVE)   19  20:30    


 


Urine Urobilinogen  0.2 E.U./dL (0.2 - 1.0)   19  20:30    


 


Ur Leukocyte Esterase  NEGATIVE  (NEGATIVE)   19  20:30    


 


Urine RBC  NONE SEEN /hpf (0-5)   19  20:30    


 


Urine WBC  0-2 /hpf (0-5)   19  20:30    


 


Ur Epithelial Cells  FEW /lpf (FEW)   19  20:30    


 


Urine Bacteria  FEW /hpf (NONE SEEN)   19  20:30    


 


Urine Mucus  FEW /lpf (FEW)   19  20:30    














- Physical Exam


Vitals and I&O: 


 Vital Signs











Temp  97.8 F   19 20:21


 


Pulse  64   19 20:21


 


Resp  20   19 20:21


 


BP  133/62   19 20:21


 


Pulse Ox  93   19 20:21








 Intake & Output











 19





 06:59 18:59 06:59


 


Intake Total 300  120


 


Balance 300  120


 


Intake:   


 


  Oral 300  120


 


Other:   


 


  # Voids 2  3


 


  # Bowel Movements 0  0











Active Medications: 


Current Medications





Acetaminophen (Tylenol)  650 mg PO Q4HR PRN


   PRN Reason: Mild Pain / Temp above 100


   Stop: 10/27/19 21:26


Al Hydrox/Mg Hydrox/Simethicone (Maalox)  30 ml PO Q4HR PRN


   PRN Reason: GI DISTRESS


   Stop: 10/26/19 21:51


Aripiprazole (Abilify)  10 mg PO HS DANYA; Protocol


   Stop: 19 20:59


   Last Admin: 19 20:11 Dose:  10 mg


Benztropine Mesylate (Cogentin)  1 mg PO BID DANYA


   Stop: 10/27/19 08:59


   Last Admin: 19 16:46 Dose:  1 mg


Divalproex Sodium (Depakote Sprinkle)  375 mg PO Q12HR DANYA; Protocol


   Stop: 10/28/19 20:59


   Last Admin: 19 20:12 Dose:  375 mg


Docusate Sodium (Colace)  100 mg PO BID DANYA


   Stop: 10/27/19 08:59


   Last Admin: 19 16:46 Dose:  100 mg


Levothyroxine Sodium (Synthroid)  0.075 mg PO QDAC DANYA


   Stop: 10/27/19 07:29


   Last Admin: 19 06:48 Dose:  0.075 mg


Lorazepam (Ativan)  0.5 mg PO Q4HR PRN; Protocol


   PRN Reason: Agitation


   Stop: 19 21:19


   Last Admin: 19 08:47 Dose:  0.5 mg


Magnesium Hydroxide (Milk Of Magnesia)  30 ml PO HS PRN


   PRN Reason: Constipation


Metoprolol Succinate (Toprol Xl)  50 mg PO DAILY [facility]


   Stop: 10/27/19 08:59


   Last Admin: 19 08:53 Dose:  50 mg


Multivitamins/Vitamin C (Theragran)  1 tab PO DAILY DANYA


   Stop: 10/27/19 08:59


   Last Admin: 19 08:53 Dose:  1 tab


Zolpidem Tartrate (Ambien)  5 mg PO HS PRN


   PRN Reason: Insomnia


   Stop: 10/26/19 21:51


   Last Admin: 19 20:50 Dose:  5 mg








General: demented


HEENT: NC/AT, PERRLA, EOMI, anicteric sclerae, throat clear


Neck: Supple, No JVD, No thyromegaly, +2 carotid pulse wo bruit, No LAD


Lungs: CTAB


Cardiovascular: RRR, Normal S1, Normal S2, without murmur


Abdomen: soft, non-tender, non-distended


Neurological: no change, gait stable, unable to follow command





- Procedures


Procedures: 


 Procedures











Procedure Code Date


 


GROUP PSYCHOTHERAPY 62895 16


 


GROUP PSYCHOTHERAPY GZHZZZZ 16


 


GROUP PSYCHOTHERAPY [id number] 16


 


GROUP PSYCHOTHERAPY GZHZZZZ 16


 


INDIVID PSYCHOTHERAP NEC 94.39 08


 


OTHER GROUP THERAPY 94.44 08/27/15


 


RECREATIONAL THERAPY 93.81 13














Internal Medicine Assmt/Plan





- Assessment


Assessment: 





1.HTN.


2.HYPOTHYROIDISM


3.DJD.


4.PSYCHOSIS





- Plan


Plan: 





CONTINUE ON CURRENT MEDICATION AND DIET.





Nutritional Asmnt/Malnutr-PDOC





- Dietary Evaluation


Malnutrition Findings (Please click <Entered> for more info): 








Nutritional Asmnt/Malnutrition                             Start:  19 11:

33


Text:                                                      Status: Complete    

  


Freq:                                                                          

  


Protocol:                                                                      

  


 Document     19 11:33  ELE  (Rec: 19 11:50  MMULRODNEY COLEMAN-

FNS1)


 Nutritional Asmnt/Malnutrition


     Patient General Information


      Nutritional Screening                      Low Risk


      Diagnosis                                  Psychosis


      Pertinent Medical Hx/Surgical Hx           Hypothyroidism, hypertension,


                                                 degenerative joint disease,


                                                 dementia, depression


      Subjective Information                     Patient was admitted from


                                                 nursing home with increased


                                                 agitation and aggressiveness.


                                                 Tolerating current diet with


                                                 adequate intake.


      Current Diet Order/ Nutrition Support      Regular


      Patient / S.O                              Not Indicated


      Pertinent Medications                      Maalox, Colace, Synthroid, MOM


                                                 , Theragran


      Pertinent Labs                             () Albumin 3.7


     Nutritional Hx/Data


      Height                                     1.8 m


      Height (Calculated Centimeters)            180.3


      Current Weight (lbs)                       75.75 kg


      Weight (Calculated Kilograms)              75.7


      Weight (Calculated Grams)                  91446.9


      Ideal Body Weight                          172


      % Ideal Body Weight                        97


      Body Mass Index (BMI)                      23.3


      Recent Weight Change                       No


      Weight Status                              Approriate


     GI Symptoms


      GI Symptoms                                None


      Last BM                                    8/31 x 1


      Difficult in:                              None


      Food Allergies                             No


      Cultural/Ethnic/Caodaism Belief           none indicated


      Usual diet at home                         unknown


      Skin Integrity/Comment:                    Thom 22, Intact


      Current %PO                                Good (%)


     Estimated Nutritional Goals


      BEE in Kcals:                              Using Current wt


      Calories/Kcals/Kg                          CBW 76.8kg 25-30 kcal/kg


      Kcals Calculated                           ~1693-8946 kcal/day


      Protein:                                   Using Current wt


      Protein g/k-1.2 gm/kg


      Protein Calculated                         ~75-90 gm/day


      Fluid: ml                                  ~3613-7488 ml/day (1 ml/kcal)


     Nutritional Problem


      No current Nutrition Prob


       Problem                                   No nutrition diagnosis at this


                                                 time


     Intervention/Recommendation


      Comments                                   1. Continue regular diet as


                                                 tolerated by patient.


     Expected Outcomes/Goals


      Expected Outcomes/Goals                    Oral intake >75% of meals,


                                                 weight stable, nutrition


                                                 related labs WNL


                                                 F/U LR

## 2019-09-03 NOTE — PROGRESS NOTES
DATE:     09/03/2019



SUBJECTIVE:  The patient was lying on his bed, alert, calm and pleasant.  The

patient smiled when approached.  The patient reported that he has been doing

okay, eating and sleeping well.  The patient continued to deny any auditory or

visual hallucination or delusion.



OBJECTIVE:  The patient continued to be calm and pleasant when talked to.  The

staff reported that the patient has been doing okay, has been cooperative with

care and treatment, taking his medication without any problem.  The patient has

been eating and sleeping well.  The patient has been up in the hallway, walking

around at times.  The patient also talked to himself at times.  The patient

normally does not like to participate in activities.  The patient tend to keep

to himself.



ASSESSMENT:

1.  Schizophrenia of chronic paranoid type, in acute exacerbation.

2.  Alzheimer dementia with psychosis, mood disorder, depressed and behavioral

disturbance.

3.  Impulse control disorder, not otherwise specified.

4.  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



PLAN:  We will continue the patient on current Abilify and Depakote.  The

patient appeared to be responding to the medication and exhibited no adverse

effect and no behavioral issue at this time.  If the patient continued to be

doing well, we will consider discharging the patient in the next day or two.





DD: 09/03/2019 09:56

DT: 09/03/2019 20:53

[facility]# [id number]  [id number]

AYANNA

## 2019-09-04 RX ADMIN — LEVOTHYROXINE SODIUM SCH MG: 75 TABLET ORAL at 06:47

## 2019-09-04 NOTE — PROGRESS NOTES
DATE:     09/04/2019



SUBJECTIVE:  The patient was up ambulating in the hallway and came by nursing

station.  The patient remained very calm and pleasant when talked to.  The

patient continued to report that he is doing okay, that he has been eating and

sleeping okay.  The patient continued to deny any auditory or visual

hallucination or delusion.  The patient stated that he has not talked to his

mother recently.



OBJECTIVE:  The patient continued to be calm and pleasant.  The patient

continued to be up and about on and off.  The staff reported that the patient

continued to be doing okay.  He has been taking his medication.  At times, the

patient has episodes of responding to internal stimuli, talking to himself and

at times appeared to be agitated.  The staff reported that they have to give the

patient Ativan at times.  The staff reported that the patient continued to be

eating and sleeping well.  The patient continued to keep to himself, eating in

his room and not attending activities.



ASSESSMENT:

1.  Schizophrenia, chronic paranoid type, in acute exacerbation.

2.  Alzheimer dementia with psychosis and depression with behavioral

disturbance.

3.  Impulse control disorder, not otherwise specified.

4.  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



PLAN:  We will continue the patient on current medication.  We will monitor the

patient's behavior.  If the patient has recurrent aggressive behavior, we will

consider increasing the Depakote back to 500 mg p.o. q.12 hours.





DD: 09/04/2019 10:59

DT: 09/04/2019 22:32

JOB# [id number]  [id number]

AYANNA

## 2019-09-04 NOTE — INTERNAL MEDICINE PROG NOTE
Internal Medicine Subjective





- Subjective


Service Date: 19


Patient seen and examined:: without staff (HE IS CONFUSED)


Patient is:: awake, non-verbal, in bed, confused


Per staff patient has:: no adverse event





Internal Medicine Objective





- Results


Result Diagrams: 


 19 20:05





 19 20:05


Recent Labs: 


 Laboratory Last Values











WBC  8.5 Th/cmm (4.8-10.8)   19  20:05    


 


RBC  4.58 Mil/cmm (3.80-5.80)   19  20:05    


 


Hgb  13.7 gm/dL (12-16)   19  20:05    


 


Hct  40.3 % (41.0-60)  L  19  20:05    


 


MCV  87.9 fl (80-99)   19  20:05    


 


MCH  30.0 pg (27.0-31.0)   19  20:05    


 


MCHC Differential  34.1 pg (28.0-36.0)   19  20:05    


 


RDW  12.7 % (11.5-20.0)   19  20:05    


 


Plt Count  276 Th/cmm (150-400)   19  20:05    


 


MPV  7.3 fl  19  20:05    


 


Neutrophils %  77.4 % (40.0-80.0)   19  20:05    


 


Lymphocytes %  15.6 % (20.0-50.0)  L  19  20:05    


 


Monocytes %  6.2 % (2.0-10.0)   19  20:05    


 


Eosinophils %  0.8 % (0.0-5.0)   19  20:05    


 


Basophils %  0.0 % (0.0-2.0)   19  20:05    


 


PT  10.6 SECONDS (9.5-11.5)   19  20:05    


 


INR  1.02  (0.5-1.4)   19  20:05    


 


PTT (Actin FS)  26.5 SECONDS (26.0-38.0)   19  20:05    


 


Sodium  135 mEq/L (136-145)  L  19  20:05    


 


Potassium  4.0 mEq/L (3.5-5.1)   19  20:05    


 


Chloride  102 mEq/L ()   19  20:05    


 


Carbon Dioxide  26.0 mEq/L (21.0-31.0)   19  20:05    


 


Anion Gap  11.0  (7.0-16.0)   19  20:05    


 


BUN  17 mg/dL (7-25)   19  20:05    


 


Creatinine  0.6 mg/dL (0.7-1.3)  L  19  20:05    


 


Est GFR ( Amer)  TNP   19  20:05    


 


Est GFR (Non-Af Amer)  TNP   19  20:05    


 


BUN/Creatinine Ratio  28.3   19  20:05    


 


Glucose  115 mg/dL ()  H  19  20:05    


 


Calcium  8.9 mg/dL (8.6-10.3)   19  20:05    


 


Total Bilirubin  0.5 mg/dL (0.3-1.0)   19  20:05    


 


AST  15 U/L (13-39)   19  20:05    


 


ALT  13 U/L (7-52)   19  20:05    


 


Alkaline Phosphatase  45 U/L ()   19  20:05    


 


Troponin I  < 0.02 ng/mL (0.01-0.05)   19  20:05    


 


B-Natriuretic Peptide  54.8 pg/mL (5.0-100.0)   19  20:05    


 


Total Protein  6.5 gm/dL (6.0-8.3)   19  20:05    


 


Albumin  3.7 gm/dL (4.2-5.5)  L  19  20:05    


 


Globulin  2.8 gm/dL  19  20:05    


 


Albumin/Globulin Ratio  1.3  (1.0-1.8)   19  20:05    


 


Triglycerides  101 mg/dL (<150)   19  20:05    


 


Cholesterol  145 mg/dL (<200)   19  20:05    


 


LDL Cholesterol Direct  76 mg/dL ()   19  20:05    


 


HDL Cholesterol  63 mg/dL (23-92)   19  20:05    


 


TSH  1.14 uIU/ml (0.34-5.60)   19  20:05    


 


Urine Source  MIDSTREAM   19  20:30    


 


Urine Color  YELLOW   19  20:30    


 


Urine Clarity  CLEAR  (CLEAR)   19  20:30    


 


Urine pH  5.5  (4.6 - 8.0)   19  20:30    


 


Ur Specific Gravity  1.025  (1.005-1.030)   19  20:30    


 


Urine Protein  NEGATIVE mg/dL (NEGATIVE)   19  20:30    


 


Urine Glucose (UA)  NEGATIVE mg/dL (NEGATIVE)   19  20:30    


 


Urine Ketones  NEGATIVE mg/dL (NEGATIVE)   19  20:30    


 


Urine Blood  NEGATIVE  (NEGATIVE)   19  20:30    


 


Urine Nitrate  NEGATIVE  (NEGATIVE)   19  20:30    


 


Urine Bilirubin  NEGATIVE  (NEGATIVE)   19  20:30    


 


Urine Urobilinogen  0.2 E.U./dL (0.2 - 1.0)   19  20:30    


 


Ur Leukocyte Esterase  NEGATIVE  (NEGATIVE)   19  20:30    


 


Urine RBC  NONE SEEN /hpf (0-5)   19  20:30    


 


Urine WBC  0-2 /hpf (0-5)   19  20:30    


 


Ur Epithelial Cells  FEW /lpf (FEW)   19  20:30    


 


Urine Bacteria  FEW /hpf (NONE SEEN)   19  20:30    


 


Urine Mucus  FEW /lpf (FEW)   19  20:30    














- Physical Exam


Vitals and I&O: 


 Vital Signs











Temp  97.5 F   19 14:00


 


Pulse  50   19 14:00


 


Resp  20   19 14:00


 


BP  140/70   19 14:00


 


Pulse Ox  96   19 14:00








 Intake & Output











 19





 06:59 18:59 06:59


 


Intake Total 120 1200 


 


Balance 120 1200 


 


Intake:   


 


  Oral 120 1200 


 


Other:   


 


  # Voids 3  


 


  # Bowel Movements 0 1 











Active Medications: 


Current Medications





Acetaminophen (Tylenol)  650 mg PO Q4HR PRN


   PRN Reason: Mild Pain / Temp above 100


   Stop: 10/27/19 21:26


Al Hydrox/Mg Hydrox/Simethicone (Maalox)  30 ml PO Q4HR PRN


   PRN Reason: GI DISTRESS


   Stop: 10/26/19 21:51


Aripiprazole (Abilify)  10 mg PO HS DANYA; Protocol


   Stop: 19 20:59


   Last Admin: 19 20:11 Dose:  10 mg


Benztropine Mesylate (Cogentin)  1 mg PO BID DANYA


   Stop: 10/27/19 08:59


   Last Admin: 19 16:46 Dose:  1 mg


Divalproex Sodium (Depakote Sprinkle)  375 mg PO Q12HR DANYA; Protocol


   Stop: 10/28/19 20:59


   Last Admin: 19 08:32 Dose:  375 mg


Docusate Sodium (Colace)  100 mg PO BID DANYA


   Stop: 10/27/19 08:59


   Last Admin: 19 16:46 Dose:  100 mg


Levothyroxine Sodium (Synthroid)  0.075 mg PO QDAC DANYA


   Stop: 10/27/19 07:29


   Last Admin: 19 06:47 Dose:  0.075 mg


Lorazepam (Ativan)  0.5 mg PO Q4HR PRN; Protocol


   PRN Reason: Agitation


   Stop: 19 21:19


   Last Admin: 19 08:31 Dose:  0.5 mg


Magnesium Hydroxide (Milk Of Magnesia)  30 ml PO HS PRN


   PRN Reason: Constipation


Metoprolol Succinate (Toprol Xl)  50 mg PO DAILY [facility]


   Stop: 10/27/19 08:59


   Last Admin: 19 08:51 Dose:  50 mg


Multivitamins/Vitamin C (Theragran)  1 tab PO DAILY DANYA


   Stop: 10/27/19 08:59


   Last Admin: 19 08:31 Dose:  1 tab


Zolpidem Tartrate (Ambien)  5 mg PO HS PRN


   PRN Reason: Insomnia


   Stop: 10/26/19 21:51


   Last Admin: 19 20:50 Dose:  5 mg








General: demented


HEENT: NC/AT, PERRLA, EOMI, anicteric sclerae, throat clear


Neck: Supple, No JVD, No thyromegaly, +2 carotid pulse wo bruit, No LAD


Lungs: CTAB


Cardiovascular: RRR, Normal S1, Normal S2, without murmur


Abdomen: soft, non-tender, non-distended


Neurological: no change, gait stable, unable to follow command





- Procedures


Procedures: 


 Procedures











Procedure Code Date


 


GROUP PSYCHOTHERAPY 00764 16


 


GROUP PSYCHOTHERAPY GZHZZZZ 16


 


GROUP PSYCHOTHERAPY 92330 16


 


GROUP PSYCHOTHERAPY GZHZZZZ 16


 


INDIVID PSYCHOTHERAP NEC 94.39 08


 


OTHER GROUP THERAPY 94.44 08/27/15


 


RECREATIONAL THERAPY 93.81 13














Internal Medicine Assmt/Plan





- Assessment


Assessment: 





1.HTN.


2.HYPOTHYROIDISM


3.DJD.


4.PSYCHOSIS





- Plan


Plan: 





CONTINUE ON CURRENT MEDICATION AND DIET.





Nutritional Asmnt/Malnutr-PDOC





- Dietary Evaluation


Malnutrition Findings (Please click <Entered> for more info): 








Nutritional Asmnt/Malnutrition                             Start:  19 11:

33


Text:                                                      Status: Complete    

  


Freq:                                                                          

  


Protocol:                                                                      

  


 Document     19 11:33  ELE  (Rec: 19 11:50  MMNEEMA COLEMAN-

FNS1)


 Nutritional Asmnt/Malnutrition


     Patient General Information


      Nutritional Screening                      Low Risk


      Diagnosis                                  Psychosis


      Pertinent Medical Hx/Surgical Hx           Hypothyroidism, hypertension,


                                                 degenerative joint disease,


                                                 dementia, depression


      Subjective Information                     Patient was admitted from


                                                 nursing home with increased


                                                 agitation and aggressiveness.


                                                 Tolerating current diet with


                                                 adequate intake.


      Current Diet Order/ Nutrition Support      Regular


      Patient / S.O                              Not Indicated


      Pertinent Medications                      Maalox, Colace, Synthroid, MOM


                                                 , Theragran


      Pertinent Labs                             () Albumin 3.7


     Nutritional Hx/Data


      Height                                     1.8 m


      Height (Calculated Centimeters)            180.3


      Current Weight (lbs)                       75.75 kg


      Weight (Calculated Kilograms)              75.7


      Weight (Calculated Grams)                  70064.9


      Ideal Body Weight                          172


      % Ideal Body Weight                        97


      Body Mass Index (BMI)                      23.3


      Recent Weight Change                       No


      Weight Status                              Approriate


     GI Symptoms


      GI Symptoms                                None


      Last BM                                    8/31 x 1


      Difficult in:                              None


      Food Allergies                             No


      Cultural/Ethnic/Anabaptism Belief           none indicated


      Usual diet at home                         unknown


      Skin Integrity/Comment:                    Thom 22, Intact


      Current %PO                                Good (%)


     Estimated Nutritional Goals


      BEE in Kcals:                              Using Current wt


      Calories/Kcals/Kg                          CBW 76.8kg 25-30 kcal/kg


      Kcals Calculated                           ~2050-8408 kcal/day


      Protein:                                   Using Current wt


      Protein g/k-1.2 gm/kg


      Protein Calculated                         ~75-90 gm/day


      Fluid: ml                                  ~1510-0617 ml/day (1 ml/kcal)


     Nutritional Problem


      No current Nutrition Prob


       Problem                                   No nutrition diagnosis at this


                                                 time


     Intervention/Recommendation


      Comments                                   1. Continue regular diet as


                                                 tolerated by patient.


     Expected Outcomes/Goals


      Expected Outcomes/Goals                    Oral intake >75% of meals,


                                                 weight stable, nutrition


                                                 related labs WNL


                                                 F/U LR

## 2019-09-05 RX ADMIN — LEVOTHYROXINE SODIUM SCH MG: 75 TABLET ORAL at 06:42

## 2019-09-05 NOTE — INTERNAL MEDICINE PROG NOTE
Internal Medicine Subjective





- Subjective


Service Date: 19


Patient seen and examined:: without staff (HE IS DOING WELL)


Patient is:: awake, non-verbal, in bed, confused


Per staff patient has:: no adverse event





Internal Medicine Objective





- Results


Result Diagrams: 


 19 20:05





 19 20:05


Recent Labs: 


 Laboratory Last Values











WBC  8.5 Th/cmm (4.8-10.8)   19  20:05    


 


RBC  4.58 Mil/cmm (3.80-5.80)   19  20:05    


 


Hgb  13.7 gm/dL (12-16)   19  20:05    


 


Hct  40.3 % (41.0-60)  L  19  20:05    


 


MCV  87.9 fl (80-99)   19  20:05    


 


MCH  30.0 pg (27.0-31.0)   19  20:05    


 


MCHC Differential  34.1 pg (28.0-36.0)   19  20:05    


 


RDW  12.7 % (11.5-20.0)   19  20:05    


 


Plt Count  276 Th/cmm (150-400)   19  20:05    


 


MPV  7.3 fl  19  20:05    


 


Neutrophils %  77.4 % (40.0-80.0)   19  20:05    


 


Lymphocytes %  15.6 % (20.0-50.0)  L  19  20:05    


 


Monocytes %  6.2 % (2.0-10.0)   19  20:05    


 


Eosinophils %  0.8 % (0.0-5.0)   19  20:05    


 


Basophils %  0.0 % (0.0-2.0)   19  20:05    


 


PT  10.6 SECONDS (9.5-11.5)   19  20:05    


 


INR  1.02  (0.5-1.4)   19  20:05    


 


PTT (Actin FS)  26.5 SECONDS (26.0-38.0)   19  20:05    


 


Sodium  135 mEq/L (136-145)  L  19  20:05    


 


Potassium  4.0 mEq/L (3.5-5.1)   19  20:05    


 


Chloride  102 mEq/L ()   19  20:05    


 


Carbon Dioxide  26.0 mEq/L (21.0-31.0)   19  20:05    


 


Anion Gap  11.0  (7.0-16.0)   19  20:05    


 


BUN  17 mg/dL (7-25)   19  20:05    


 


Creatinine  0.6 mg/dL (0.7-1.3)  L  19  20:05    


 


Est GFR ( Amer)  TNP   19  20:05    


 


Est GFR (Non-Af Amer)  TNP   19  20:05    


 


BUN/Creatinine Ratio  28.3   19  20:05    


 


Glucose  115 mg/dL ()  H  19  20:05    


 


Calcium  8.9 mg/dL (8.6-10.3)   19  20:05    


 


Total Bilirubin  0.5 mg/dL (0.3-1.0)   19  20:05    


 


AST  15 U/L (13-39)   19  20:05    


 


ALT  13 U/L (7-52)   19  20:05    


 


Alkaline Phosphatase  45 U/L ()   19  20:05    


 


Troponin I  < 0.02 ng/mL (0.01-0.05)   19  20:05    


 


B-Natriuretic Peptide  54.8 pg/mL (5.0-100.0)   19  20:05    


 


Total Protein  6.5 gm/dL (6.0-8.3)   19  20:05    


 


Albumin  3.7 gm/dL (4.2-5.5)  L  19  20:05    


 


Globulin  2.8 gm/dL  19  20:05    


 


Albumin/Globulin Ratio  1.3  (1.0-1.8)   19  20:05    


 


Triglycerides  101 mg/dL (<150)   19  20:05    


 


Cholesterol  145 mg/dL (<200)   19  20:05    


 


LDL Cholesterol Direct  76 mg/dL ()   19  20:05    


 


HDL Cholesterol  63 mg/dL (23-92)   19  20:05    


 


TSH  1.14 uIU/ml (0.34-5.60)   19  20:05    


 


Urine Source  MIDSTREAM   19  20:30    


 


Urine Color  YELLOW   19  20:30    


 


Urine Clarity  CLEAR  (CLEAR)   19  20:30    


 


Urine pH  5.5  (4.6 - 8.0)   19  20:30    


 


Ur Specific Gravity  1.025  (1.005-1.030)   19  20:30    


 


Urine Protein  NEGATIVE mg/dL (NEGATIVE)   19  20:30    


 


Urine Glucose (UA)  NEGATIVE mg/dL (NEGATIVE)   19  20:30    


 


Urine Ketones  NEGATIVE mg/dL (NEGATIVE)   19  20:30    


 


Urine Blood  NEGATIVE  (NEGATIVE)   19  20:30    


 


Urine Nitrate  NEGATIVE  (NEGATIVE)   19  20:30    


 


Urine Bilirubin  NEGATIVE  (NEGATIVE)   19  20:30    


 


Urine Urobilinogen  0.2 E.U./dL (0.2 - 1.0)   19  20:30    


 


Ur Leukocyte Esterase  NEGATIVE  (NEGATIVE)   19  20:30    


 


Urine RBC  NONE SEEN /hpf (0-5)   19  20:30    


 


Urine WBC  0-2 /hpf (0-5)   19  20:30    


 


Ur Epithelial Cells  FEW /lpf (FEW)   19  20:30    


 


Urine Bacteria  FEW /hpf (NONE SEEN)   19  20:30    


 


Urine Mucus  FEW /lpf (FEW)   19  20:30    














- Physical Exam


Vitals and I&O: 


 Vital Signs











Temp  97.3 F   19 14:00


 


Pulse  57   19 14:00


 


Resp  18   19 14:00


 


BP  107/55   19 14:00


 


Pulse Ox  96   19 14:00








 Intake & Output











 19





 18:59 06:59 18:59


 


Intake Total [phone]


 


Balance 3119 569 0454


 


Intake:   


 


  Oral [phone]


 


Other:   


 


  # Voids  2 4


 


  # Bowel Movements 1 0 1











Active Medications: 


Current Medications





Acetaminophen (Tylenol)  650 mg PO Q4HR PRN


   PRN Reason: Mild Pain / Temp above 100


   Stop: 10/27/19 21:26


Al Hydrox/Mg Hydrox/Simethicone (Maalox)  30 ml PO Q4HR PRN


   PRN Reason: GI DISTRESS


   Stop: 10/26/19 21:51


Aripiprazole (Abilify)  15 mg PO HS DANYA; Protocol


   Stop: 19 20:59


Benztropine Mesylate (Cogentin)  1 mg PO BID DANYA


   Stop: 10/27/19 08:59


   Last Admin: 19 16:40 Dose:  1 mg


Divalproex Sodium (Depakote Sprinkle)  375 mg PO Q12HR DANYA; Protocol


   Stop: 10/28/19 20:59


   Last Admin: 19 08:05 Dose:  375 mg


Docusate Sodium (Colace)  100 mg PO BID DANYA


   Stop: 10/27/19 08:59


   Last Admin: 19 16:40 Dose:  100 mg


Levothyroxine Sodium (Synthroid)  0.075 mg PO QDAC DANYA


   Stop: 10/27/19 07:29


   Last Admin: 19 06:42 Dose:  0.075 mg


Lorazepam (Ativan)  0.5 mg PO Q4HR PRN; Protocol


   PRN Reason: Agitation


   Stop: 19 21:19


   Last Admin: 19 08:31 Dose:  0.5 mg


Magnesium Hydroxide (Milk Of Magnesia)  30 ml PO HS PRN


   PRN Reason: Constipation


Metoprolol Succinate (Toprol Xl)  50 mg PO DAILY [facility]


   Stop: 10/27/19 08:59


   Last Admin: 19 08:05 Dose:  Not Given


Multivitamins/Vitamin C (Theragran)  1 tab PO DAILY DANYA


   Stop: 10/27/19 08:59


   Last Admin: 19 08:05 Dose:  1 tab


Zolpidem Tartrate (Ambien)  5 mg PO HS PRN


   PRN Reason: Insomnia


   Stop: 10/26/19 21:51


   Last Admin: 19 20:50 Dose:  5 mg








General: demented


HEENT: NC/AT, PERRLA, EOMI, anicteric sclerae, throat clear


Neck: Supple, No JVD, No thyromegaly, +2 carotid pulse wo bruit, No LAD


Lungs: CTAB


Cardiovascular: RRR, Normal S1, Normal S2, without murmur


Abdomen: soft, non-tender, non-distended


Neurological: no change, gait stable, unable to follow command





- Procedures


Procedures: 


 Procedures











Procedure Code Date


 


GROUP PSYCHOTHERAPY 30869 16


 


GROUP PSYCHOTHERAPY [id number] 16


 


GROUP PSYCHOTHERAPY [id number] 16


 


GROUP PSYCHOTHERAPY [id number] 16


 


INDIVID PSYCHOTHERAP NEC 94.39 08


 


OTHER GROUP THERAPY 94.44 08/27/15


 


RECREATIONAL THERAPY 93.81 13














Internal Medicine Assmt/Plan





- Assessment


Assessment: 





1.HTN.


2.HYPOTHYROIDISM


3.DJD.


4.PSYCHOSIS





- Plan


Plan: 





CONTINUE ON CURRENT MEDICATION AND DIET.





Nutritional Asmnt/Malnutr-PDOC





- Dietary Evaluation


Malnutrition Findings (Please click <Entered> for more info): 








Nutritional Asmnt/Malnutrition                             Start:  19 11:

33


Text:                                                      Status: Complete    

  


Freq:                                                                          

  


Protocol:                                                                      

  


 Document     19 11:33  ELE  (Rec: 19 11:50  MMULRODNEY COLEMAN-

FNS1)


 Nutritional Asmnt/Malnutrition


     Patient General Information


      Nutritional Screening                      Low Risk


      Diagnosis                                  Psychosis


      Pertinent Medical Hx/Surgical Hx           Hypothyroidism, hypertension,


                                                 degenerative joint disease,


                                                 dementia, depression


      Subjective Information                     Patient was admitted from


                                                 nursing home with increased


                                                 agitation and aggressiveness.


                                                 Tolerating current diet with


                                                 adequate intake.


      Current Diet Order/ Nutrition Support      Regular


      Patient / S.O                              Not Indicated


      Pertinent Medications                      Maalox, Colace, Synthroid, MOM


                                                 , Theragran


      Pertinent Labs                             () Albumin 3.7


     Nutritional Hx/Data


      Height                                     1.8 m


      Height (Calculated Centimeters)            180.3


      Current Weight (lbs)                       75.75 kg


      Weight (Calculated Kilograms)              75.7


      Weight (Calculated Grams)                  72376.9


      Ideal Body Weight                          172


      % Ideal Body Weight                        97


      Body Mass Index (BMI)                      23.3


      Recent Weight Change                       No


      Weight Status                              Approriate


     GI Symptoms


      GI Symptoms                                None


      Last BM                                    8/31 x 1


      Difficult in:                              None


      Food Allergies                             No


      Cultural/Ethnic/Anabaptist Belief           none indicated


      Usual diet at home                         unknown


      Skin Integrity/Comment:                    Thom 22, Intact


      Current %PO                                Good (%)


     Estimated Nutritional Goals


      BEE in Kcals:                              Using Current wt


      Calories/Kcals/Kg                          CBW 76.8kg 25-30 kcal/kg


      Kcals Calculated                           ~4822-7849 kcal/day


      Protein:                                   Using Current wt


      Protein g/k-1.2 gm/kg


      Protein Calculated                         ~75-90 gm/day


      Fluid: ml                                  ~4267-8766 ml/day (1 ml/kcal)


     Nutritional Problem


      No current Nutrition Prob


       Problem                                   No nutrition diagnosis at this


                                                 time


     Intervention/Recommendation


      Comments                                   1. Continue regular diet as


                                                 tolerated by patient.


     Expected Outcomes/Goals


      Expected Outcomes/Goals                    Oral intake >75% of meals,


                                                 weight stable, nutrition


                                                 related labs WNL


                                                 F/U LR

## 2019-09-05 NOTE — PROGRESS NOTES
DATE:     09/05/2019



SUBJECTIVE:  The patient was resting on the bed, alert, calm and quiet and

pleasant when approached to talk to.  The patient continued to smile when

approached.  The patient continued to reach out for a handshake.  The patient

reported that he is doing fine, eating and sleeping okay.  No problem.  The

patient continued to deny any auditory or visual hallucination or delusion.



OBJECTIVE:  The patient continued to be pleasant when approached.  The patient

continued to deny any problem.  Denied any agitation, aggression or yelling and

screaming.  The staff reported that the patient continued to have episodes where

out of the blue the patient would start yelling and started punching at the air.



 reported that the patient was swinging at her as she was going

by.  The patient did not hit her.  The staff reported that the patient continued

to be compliant with care and treatment, taking his medications without any

problem.



Discussed with the staff at [facility] earlier this morning as to the

reason why the patient was sent to the hospital.  The staff reported that the

patient had episodes of yelling and screaming for no apparent reason and started

punching at the air.



ASSESSMENT:

1.  Schizophrenia, chronic paranoid type, in acute exacerbation.

2.  Alzheimer's dementia with psychosis and depression with behavioral

disturbance.

3.  Impulse control disorder, not otherwise specified.

4.  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



PLAN:  We will increase his Abilify to 15 mg p.o. at bedtime since the patient

continued to have recurrent episodes of agitation, yelling and punching at the

air as well as swinging at the staff.  We will monitor the patient's response to

the increased Abilify.





DD: 09/05/2019 13:40

DT: 09/05/2019 22:51

JOB# [id number]  [id number]

AYANNA

## 2019-09-06 RX ADMIN — LEVOTHYROXINE SODIUM SCH MG: 75 TABLET ORAL at 06:47

## 2019-09-06 NOTE — INTERNAL MEDICINE PROG NOTE
Internal Medicine Subjective





- Subjective


Service Date: 19


Patient seen and examined:: without staff (HE IS STABLE)


Patient is:: awake, non-verbal, in bed, confused


Per staff patient has:: no adverse event





Internal Medicine Objective





- Results


Result Diagrams: 


 19 20:05





 19 20:05


Recent Labs: 


 Laboratory Last Values











WBC  8.5 Th/cmm (4.8-10.8)   19  20:05    


 


RBC  4.58 Mil/cmm (3.80-5.80)   19  20:05    


 


Hgb  13.7 gm/dL (12-16)   19  20:05    


 


Hct  40.3 % (41.0-60)  L  19  20:05    


 


MCV  87.9 fl (80-99)   19  20:05    


 


MCH  30.0 pg (27.0-31.0)   19  20:05    


 


MCHC Differential  34.1 pg (28.0-36.0)   19  20:05    


 


RDW  12.7 % (11.5-20.0)   19  20:05    


 


Plt Count  276 Th/cmm (150-400)   19  20:05    


 


MPV  7.3 fl  19  20:05    


 


Neutrophils %  77.4 % (40.0-80.0)   19  20:05    


 


Lymphocytes %  15.6 % (20.0-50.0)  L  19  20:05    


 


Monocytes %  6.2 % (2.0-10.0)   19  20:05    


 


Eosinophils %  0.8 % (0.0-5.0)   19  20:05    


 


Basophils %  0.0 % (0.0-2.0)   19  20:05    


 


PT  10.6 SECONDS (9.5-11.5)   19  20:05    


 


INR  1.02  (0.5-1.4)   19  20:05    


 


PTT (Actin FS)  26.5 SECONDS (26.0-38.0)   19  20:05    


 


Sodium  135 mEq/L (136-145)  L  19  20:05    


 


Potassium  4.0 mEq/L (3.5-5.1)   19  20:05    


 


Chloride  102 mEq/L ()   19  20:05    


 


Carbon Dioxide  26.0 mEq/L (21.0-31.0)   19  20:05    


 


Anion Gap  11.0  (7.0-16.0)   19  20:05    


 


BUN  17 mg/dL (7-25)   19  20:05    


 


Creatinine  0.6 mg/dL (0.7-1.3)  L  19  20:05    


 


Est GFR ( Amer)  TNP   19  20:05    


 


Est GFR (Non-Af Amer)  TNP   19  20:05    


 


BUN/Creatinine Ratio  28.3   19  20:05    


 


Glucose  115 mg/dL ()  H  19  20:05    


 


Calcium  8.9 mg/dL (8.6-10.3)   19  20:05    


 


Total Bilirubin  0.5 mg/dL (0.3-1.0)   19  20:05    


 


AST  15 U/L (13-39)   19  20:05    


 


ALT  13 U/L (7-52)   19  20:05    


 


Alkaline Phosphatase  45 U/L ()   19  20:05    


 


Troponin I  < 0.02 ng/mL (0.01-0.05)   19  20:05    


 


B-Natriuretic Peptide  54.8 pg/mL (5.0-100.0)   19  20:05    


 


Total Protein  6.5 gm/dL (6.0-8.3)   19  20:05    


 


Albumin  3.7 gm/dL (4.2-5.5)  L  19  20:05    


 


Globulin  2.8 gm/dL  19  20:05    


 


Albumin/Globulin Ratio  1.3  (1.0-1.8)   19  20:05    


 


Triglycerides  101 mg/dL (<150)   19  20:05    


 


Cholesterol  145 mg/dL (<200)   19  20:05    


 


LDL Cholesterol Direct  76 mg/dL ()   19  20:05    


 


HDL Cholesterol  63 mg/dL (23-92)   19  20:05    


 


TSH  1.14 uIU/ml (0.34-5.60)   19  20:05    


 


Urine Source  MIDSTREAM   19  20:30    


 


Urine Color  YELLOW   19  20:30    


 


Urine Clarity  CLEAR  (CLEAR)   19  20:30    


 


Urine pH  5.5  (4.6 - 8.0)   19  20:30    


 


Ur Specific Gravity  1.025  (1.005-1.030)   19  20:30    


 


Urine Protein  NEGATIVE mg/dL (NEGATIVE)   19  20:30    


 


Urine Glucose (UA)  NEGATIVE mg/dL (NEGATIVE)   19  20:30    


 


Urine Ketones  NEGATIVE mg/dL (NEGATIVE)   19  20:30    


 


Urine Blood  NEGATIVE  (NEGATIVE)   19  20:30    


 


Urine Nitrate  NEGATIVE  (NEGATIVE)   19  20:30    


 


Urine Bilirubin  NEGATIVE  (NEGATIVE)   19  20:30    


 


Urine Urobilinogen  0.2 E.U./dL (0.2 - 1.0)   19  20:30    


 


Ur Leukocyte Esterase  NEGATIVE  (NEGATIVE)   19  20:30    


 


Urine RBC  NONE SEEN /hpf (0-5)   19  20:30    


 


Urine WBC  0-2 /hpf (0-5)   19  20:30    


 


Ur Epithelial Cells  FEW /lpf (FEW)   19  20:30    


 


Urine Bacteria  FEW /hpf (NONE SEEN)   19  20:30    


 


Urine Mucus  FEW /lpf (FEW)   19  20:30    














- Physical Exam


Vitals and I&O: 


 Vital Signs











Temp  97.6 F   19 20:24


 


Pulse  52   19 20:24


 


Resp  19   19 20:24


 


BP  137/65   19 20:24


 


Pulse Ox  97   19 20:24








 Intake & Output











 19





 06:59 18:59 06:59


 


Intake Total  1400 480


 


Balance  1400 480


 


Intake:   


 


  Oral  1400 480


 


Other:   


 


  # Voids   1











Active Medications: 


Current Medications





Acetaminophen (Tylenol)  650 mg PO Q4HR PRN


   PRN Reason: Mild Pain / Temp above 100


   Stop: 10/27/19 21:26


Al Hydrox/Mg Hydrox/Simethicone (Maalox)  30 ml PO Q4HR PRN


   PRN Reason: GI DISTRESS


   Stop: 10/26/19 21:51


Aripiprazole (Abilify)  15 mg PO HS DANYA; Protocol


   Stop: 19 20:59


   Last Admin: 19 20:31 Dose:  15 mg


Benztropine Mesylate (Cogentin)  1 mg PO BID DANYA


   Stop: 10/27/19 08:59


   Last Admin: 19 17:00 Dose:  1 mg


Divalproex Sodium (Depakote Sprinkle)  375 mg PO Q12HR DANYA; Protocol


   Stop: 10/28/19 20:59


   Last Admin: 19 20:31 Dose:  375 mg


Docusate Sodium (Colace)  100 mg PO BID DANYA


   Stop: 10/27/19 08:59


   Last Admin: 19 17:00 Dose:  100 mg


Levothyroxine Sodium (Synthroid)  0.075 mg PO QDAC [facility]


   Stop: 10/27/19 07:29


   Last Admin: 19 06:47 Dose:  0.075 mg


Lorazepam (Ativan)  0.5 mg PO Q4HR PRN; Protocol


   PRN Reason: Agitation


   Stop: 19 21:19


   Last Admin: 19 08:31 Dose:  0.5 mg


Magnesium Hydroxide (Milk Of Magnesia)  30 ml PO HS PRN


   PRN Reason: Constipation


Metoprolol Succinate (Toprol Xl)  50 mg PO DAILY DANYA


   Stop: 10/27/19 08:59


   Last Admin: 19 08:19 Dose:  50 mg


Multivitamins/Vitamin C (Theragran)  1 tab PO DAILY DANYA


   Stop: 10/27/19 08:59


   Last Admin: 19 08:18 Dose:  1 tab


Zolpidem Tartrate (Ambien)  5 mg PO HS PRN


   PRN Reason: Insomnia


   Stop: 10/26/19 21:51


   Last Admin: 19 20:50 Dose:  5 mg








General: demented


HEENT: NC/AT, PERRLA, EOMI, anicteric sclerae, throat clear


Neck: Supple, No JVD, No thyromegaly, +2 carotid pulse wo bruit, No LAD


Lungs: CTAB


Cardiovascular: RRR, Normal S1, Normal S2, without murmur


Abdomen: soft, non-tender, non-distended


Neurological: no change, gait stable, unable to follow command





- Procedures


Procedures: 


 Procedures











Procedure Code Date


 


GROUP PSYCHOTHERAPY 05549 16


 


GROUP PSYCHOTHERAPY GZHZZZZ 16


 


GROUP PSYCHOTHERAPY [id number] 16


 


GROUP PSYCHOTHERAPY GZHZZZZ 16


 


INDIVID PSYCHOTHERAP NEC 94.39 08


 


OTHER GROUP THERAPY 94.44 08/27/15


 


RECREATIONAL THERAPY 93.81 13














Internal Medicine Assmt/Plan





- Assessment


Assessment: 





1.HTN.


2.HYPOTHYROIDISM


3.DJD.


4.PSYCHOSIS





- Plan


Plan: 





CONTINUE ON CURRENT MEDICATION AND DIET.





Nutritional Asmnt/Malnutr-PDOC





- Dietary Evaluation


Malnutrition Findings (Please click <Entered> for more info): 








Nutritional Asmnt/Malnutrition                             Start:  19 11:

33


Text:                                                      Status: Complete    

  


Freq:                                                                          

  


Protocol:                                                                      

  


 Document     19 11:33  ELE  (Rec: 19 11:50  MMNEEMA COLEMAN-

FNS1)


 Nutritional Asmnt/Malnutrition


     Patient General Information


      Nutritional Screening                      Low Risk


      Diagnosis                                  Psychosis


      Pertinent Medical Hx/Surgical Hx           Hypothyroidism, hypertension,


                                                 degenerative joint disease,


                                                 dementia, depression


      Subjective Information                     Patient was admitted from


                                                 nursing home with increased


                                                 agitation and aggressiveness.


                                                 Tolerating current diet with


                                                 adequate intake.


      Current Diet Order/ Nutrition Support      Regular


      Patient / S.O                              Not Indicated


      Pertinent Medications                      Maalox, Colace, Synthroid, MOM


                                                 , Theragran


      Pertinent Labs                             () Albumin 3.7


     Nutritional Hx/Data


      Height                                     1.8 m


      Height (Calculated Centimeters)            180.3


      Current Weight (lbs)                       75.75 kg


      Weight (Calculated Kilograms)              75.7


      Weight (Calculated Grams)                  41680.9


      Ideal Body Weight                          172


      % Ideal Body Weight                        97


      Body Mass Index (BMI)                      23.3


      Recent Weight Change                       No


      Weight Status                              Approriate


     GI Symptoms


      GI Symptoms                                None


      Last BM                                    8/31 x 1


      Difficult in:                              None


      Food Allergies                             No


      Cultural/Ethnic/Congregation Belief           none indicated


      Usual diet at home                         unknown


      Skin Integrity/Comment:                    Thom 22, Intact


      Current %PO                                Good (%)


     Estimated Nutritional Goals


      BEE in Kcals:                              Using Current wt


      Calories/Kcals/Kg                          CBW 76.8kg 25-30 kcal/kg


      Kcals Calculated                           ~4728-7619 kcal/day


      Protein:                                   Using Current wt


      Protein g/k-1.2 gm/kg


      Protein Calculated                         ~75-90 gm/day


      Fluid: ml                                  ~0218-6283 ml/day (1 ml/kcal)


     Nutritional Problem


      No current Nutrition Prob


       Problem                                   No nutrition diagnosis at this


                                                 time


     Intervention/Recommendation


      Comments                                   1. Continue regular diet as


                                                 tolerated by patient.


     Expected Outcomes/Goals


      Expected Outcomes/Goals                    Oral intake >75% of meals,


                                                 weight stable, nutrition


                                                 related labs WNL


                                                 F/U LR

## 2019-09-07 RX ADMIN — LEVOTHYROXINE SODIUM SCH MG: 75 TABLET ORAL at 06:43

## 2019-09-07 NOTE — INTERNAL MEDICINE PROG NOTE
Internal Medicine Subjective





- Subjective


Service Date: 19


Patient seen and examined:: without staff (HE IS COMFORTABLLE,CONFUSED)


Patient is:: awake, non-verbal, in bed, confused


Per staff patient has:: no adverse event





Internal Medicine Objective





- Results


Result Diagrams: 


 19 20:05





 19 20:05


Recent Labs: 


 Laboratory Last Values











WBC  8.5 Th/cmm (4.8-10.8)   19  20:05    


 


RBC  4.58 Mil/cmm (3.80-5.80)   19  20:05    


 


Hgb  13.7 gm/dL (12-16)   19  20:05    


 


Hct  40.3 % (41.0-60)  L  19  20:05    


 


MCV  87.9 fl (80-99)   19  20:05    


 


MCH  30.0 pg (27.0-31.0)   19  20:05    


 


MCHC Differential  34.1 pg (28.0-36.0)   19  20:05    


 


RDW  12.7 % (11.5-20.0)   19  20:05    


 


Plt Count  276 Th/cmm (150-400)   19  20:05    


 


MPV  7.3 fl  19  20:05    


 


Neutrophils %  77.4 % (40.0-80.0)   19  20:05    


 


Lymphocytes %  15.6 % (20.0-50.0)  L  19  20:05    


 


Monocytes %  6.2 % (2.0-10.0)   19  20:05    


 


Eosinophils %  0.8 % (0.0-5.0)   19  20:05    


 


Basophils %  0.0 % (0.0-2.0)   19  20:05    


 


PT  10.6 SECONDS (9.5-11.5)   19  20:05    


 


INR  1.02  (0.5-1.4)   19  20:05    


 


PTT (Actin FS)  26.5 SECONDS (26.0-38.0)   19  20:05    


 


Sodium  135 mEq/L (136-145)  L  19  20:05    


 


Potassium  4.0 mEq/L (3.5-5.1)   19  20:05    


 


Chloride  102 mEq/L ()   19  20:05    


 


Carbon Dioxide  26.0 mEq/L (21.0-31.0)   19  20:05    


 


Anion Gap  11.0  (7.0-16.0)   19  20:05    


 


BUN  17 mg/dL (7-25)   19  20:05    


 


Creatinine  0.6 mg/dL (0.7-1.3)  L  19  20:05    


 


Est GFR ( Amer)  TNP   19  20:05    


 


Est GFR (Non-Af Amer)  TNP   19  20:05    


 


BUN/Creatinine Ratio  28.3   19  20:05    


 


Glucose  115 mg/dL ()  H  19  20:05    


 


Calcium  8.9 mg/dL (8.6-10.3)   19  20:05    


 


Total Bilirubin  0.5 mg/dL (0.3-1.0)   19  20:05    


 


AST  15 U/L (13-39)   19  20:05    


 


ALT  13 U/L (7-52)   19  20:05    


 


Alkaline Phosphatase  45 U/L ()   19  20:05    


 


Troponin I  < 0.02 ng/mL (0.01-0.05)   19  20:05    


 


B-Natriuretic Peptide  54.8 pg/mL (5.0-100.0)   19  20:05    


 


Total Protein  6.5 gm/dL (6.0-8.3)   19  20:05    


 


Albumin  3.7 gm/dL (4.2-5.5)  L  19  20:05    


 


Globulin  2.8 gm/dL  19  20:05    


 


Albumin/Globulin Ratio  1.3  (1.0-1.8)   19  20:05    


 


Triglycerides  101 mg/dL (<150)   19  20:05    


 


Cholesterol  145 mg/dL (<200)   19  20:05    


 


LDL Cholesterol Direct  76 mg/dL ()   19  20:05    


 


HDL Cholesterol  63 mg/dL (23-92)   19  20:05    


 


TSH  1.14 uIU/ml (0.34-5.60)   19  20:05    


 


Urine Source  MIDSTREAM   19  20:30    


 


Urine Color  YELLOW   19  20:30    


 


Urine Clarity  CLEAR  (CLEAR)   19  20:30    


 


Urine pH  5.5  (4.6 - 8.0)   19  20:30    


 


Ur Specific Gravity  1.025  (1.005-1.030)   19  20:30    


 


Urine Protein  NEGATIVE mg/dL (NEGATIVE)   19  20:30    


 


Urine Glucose (UA)  NEGATIVE mg/dL (NEGATIVE)   19  20:30    


 


Urine Ketones  NEGATIVE mg/dL (NEGATIVE)   19  20:30    


 


Urine Blood  NEGATIVE  (NEGATIVE)   19  20:30    


 


Urine Nitrate  NEGATIVE  (NEGATIVE)   19  20:30    


 


Urine Bilirubin  NEGATIVE  (NEGATIVE)   19  20:30    


 


Urine Urobilinogen  0.2 E.U./dL (0.2 - 1.0)   19  20:30    


 


Ur Leukocyte Esterase  NEGATIVE  (NEGATIVE)   19  20:30    


 


Urine RBC  NONE SEEN /hpf (0-5)   19  20:30    


 


Urine WBC  0-2 /hpf (0-5)   19  20:30    


 


Ur Epithelial Cells  FEW /lpf (FEW)   19  20:30    


 


Urine Bacteria  FEW /hpf (NONE SEEN)   19  20:30    


 


Urine Mucus  FEW /lpf (FEW)   19  20:30    














- Physical Exam


Vitals and I&O: 


 Vital Signs











Temp  97.8 F   19 06:42


 


Pulse  55   19 08:26


 


Resp  19   19 06:42


 


BP  134/75   19 08:26


 


Pulse Ox  97   19 06:42








 Intake & Output











 19





 18:59 06:59 18:59


 


Intake Total 1400 960 


 


Balance 1400 960 


 


Intake:   


 


  Oral 1400 960 


 


Other:   


 


  # Voids  1 











Active Medications: 


Current Medications





Acetaminophen (Tylenol)  650 mg PO Q4HR PRN


   PRN Reason: Mild Pain / Temp above 100


   Stop: 10/27/19 21:26


Al Hydrox/Mg Hydrox/Simethicone (Maalox)  30 ml PO Q4HR PRN


   PRN Reason: GI DISTRESS


   Stop: 10/26/19 21:51


Aripiprazole (Abilify)  15 mg PO HS DANYA; Protocol


   Stop: 19 20:59


   Last Admin: 19 20:31 Dose:  15 mg


Benztropine Mesylate (Cogentin)  1 mg PO BID DANYA


   Stop: 10/27/19 08:59


   Last Admin: 19 08:27 Dose:  1 mg


Divalproex Sodium (Depakote Sprinkle)  375 mg PO Q12HR DANYA; Protocol


   Stop: 10/28/19 20:59


   Last Admin: 19 08:27 Dose:  375 mg


Docusate Sodium (Colace)  100 mg PO BID DANYA


   Stop: 10/27/19 08:59


   Last Admin: 19 08:26 Dose:  100 mg


Levothyroxine Sodium (Synthroid)  0.075 mg PO QDAC DANYA


   Stop: 10/27/19 07:29


   Last Admin: 19 06:43 Dose:  0.075 mg


Lorazepam (Ativan)  0.5 mg PO Q4HR PRN; Protocol


   PRN Reason: Agitation


   Stop: 19 21:19


   Last Admin: 19 08:31 Dose:  0.5 mg


Magnesium Hydroxide (Milk Of Magnesia)  30 ml PO HS PRN


   PRN Reason: Constipation


Metoprolol Succinate (Toprol Xl)  50 mg PO DAILY [facility]


   Stop: 10/27/19 08:59


   Last Admin: 19 08:26 Dose:  50 mg


Multivitamins/Vitamin C (Theragran)  1 tab PO DAILY DANYA


   Stop: 10/27/19 08:59


   Last Admin: 19 08:26 Dose:  1 tab


Zolpidem Tartrate (Ambien)  5 mg PO HS PRN


   PRN Reason: Insomnia


   Stop: 10/26/19 21:51


   Last Admin: 19 20:50 Dose:  5 mg








General: demented


HEENT: NC/AT, PERRLA, EOMI, anicteric sclerae, throat clear


Neck: Supple, No JVD, No thyromegaly, +2 carotid pulse wo bruit, No LAD


Lungs: CTAB


Cardiovascular: RRR, Normal S1, Normal S2, without murmur


Abdomen: soft, non-tender, non-distended


Neurological: no change, gait stable, unable to follow command





- Procedures


Procedures: 


 Procedures











Procedure Code Date


 


GROUP PSYCHOTHERAPY 39504 16


 


GROUP PSYCHOTHERAPY GZHZZZZ 16


 


GROUP PSYCHOTHERAPY [id number] 16


 


GROUP PSYCHOTHERAPY GZHZZZZ 16


 


INDIVID PSYCHOTHERAP NEC 94.39 08


 


OTHER GROUP THERAPY 94.44 08/27/15


 


RECREATIONAL THERAPY 93.81 13














Internal Medicine Assmt/Plan





- Assessment


Assessment: 





1.HTN.


2.HYPOTHYROIDISM


3.DJD.


4.PSYCHOSIS





- Plan


Plan: 





CONTINUE ON CURRENT MEDICATION AND DIET.





Nutritional Asmnt/Malnutr-PDOC





- Dietary Evaluation


Malnutrition Findings (Please click <Entered> for more info): 








Nutritional Asmnt/Malnutrition                             Start:  19 11:

33


Text:                                                      Status: Complete    

  


Freq:                                                                          

  


Protocol:                                                                      

  


 Document     19 11:33  ELE  (Rec: 19 11:50  MMULRODNEY COLEMAN-

FNS1)


 Nutritional Asmnt/Malnutrition


     Patient General Information


      Nutritional Screening                      Low Risk


      Diagnosis                                  Psychosis


      Pertinent Medical Hx/Surgical Hx           Hypothyroidism, hypertension,


                                                 degenerative joint disease,


                                                 dementia, depression


      Subjective Information                     Patient was admitted from


                                                 nursing home with increased


                                                 agitation and aggressiveness.


                                                 Tolerating current diet with


                                                 adequate intake.


      Current Diet Order/ Nutrition Support      Regular


      Patient / S.O                              Not Indicated


      Pertinent Medications                      Maalox, Colace, Synthroid, MOM


                                                 , Theragran


      Pertinent Labs                             () Albumin 3.7


     Nutritional Hx/Data


      Height                                     1.8 m


      Height (Calculated Centimeters)            180.3


      Current Weight (lbs)                       75.75 kg


      Weight (Calculated Kilograms)              75.7


      Weight (Calculated Grams)                  13105.9


      Ideal Body Weight                          172


      % Ideal Body Weight                        97


      Body Mass Index (BMI)                      23.3


      Recent Weight Change                       No


      Weight Status                              Approriate


     GI Symptoms


      GI Symptoms                                None


      Last BM                                    8/31 x 1


      Difficult in:                              None


      Food Allergies                             No


      Cultural/Ethnic/Faith Belief           none indicated


      Usual diet at home                         unknown


      Skin Integrity/Comment:                    Thom 22, Intact


      Current %PO                                Good (%)


     Estimated Nutritional Goals


      BEE in Kcals:                              Using Current wt


      Calories/Kcals/Kg                          CBW 76.8kg 25-30 kcal/kg


      Kcals Calculated                           ~6135-8676 kcal/day


      Protein:                                   Using Current wt


      Protein g/k-1.2 gm/kg


      Protein Calculated                         ~75-90 gm/day


      Fluid: ml                                  ~2048-0763 ml/day (1 ml/kcal)


     Nutritional Problem


      No current Nutrition Prob


       Problem                                   No nutrition diagnosis at this


                                                 time


     Intervention/Recommendation


      Comments                                   1. Continue regular diet as


                                                 tolerated by patient.


     Expected Outcomes/Goals


      Expected Outcomes/Goals                    Oral intake >75% of meals,


                                                 weight stable, nutrition


                                                 related labs WNL


                                                 F/U LR

## 2019-09-07 NOTE — PROGRESS NOTES
DATE:     09/06/2019



SUBJECTIVE:  The patient came out of his room, walking down the hallway with

small shuffling gait.  The patient appeared to be more irritable and agitated

today.  When I asked him how he is doing, the patient mumbles something and

waved for this writer to go away.  When continued to ask how he has been doing,

eating and sleeping, the patient continued to walk down the hallway.



OBJECTIVE:  The patient appeared to be more agitated today.  The patient did not

try to respond to any question.  The staff reported that the patient has been

more irritable and agitated for no apparent reason.  The staff reported that the

patient continued to be eating and sleeping okay.  The staff reported that the

patient appeared to be punching at the air.  The patient reported that he is

exercising.  Staff reported that the patient continued to respond to internal

stimuli, talking to himself with some episodes of yelling.  Staff reported that

the patient continued to be cooperative with care and treatment, taking his

medication without any difficulty.



ASSESSMENT:

1.  Schizophrenia, chronic paranoid type, in acute exacerbation.

2.  Alzheimer's dementia with psychosis and depression with behavioral

disturbance.

3.  Impulse control disorder, not otherwise specified.

4.  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



PLAN:  We will continue the patient on current medication.  We will consider

increasing Depakote to 500 mg p.o. q. 12 hours if the patient continued to

exhibit agitation and aggression.





DD: 09/06/2019 14:46

DT: 09/07/2019 06:20

JOB# [id number]  [id number]

AYANNA

## 2019-09-08 RX ADMIN — LEVOTHYROXINE SODIUM SCH MG: 75 TABLET ORAL at 06:35

## 2019-09-08 NOTE — PROGRESS NOTES
DATE:     



SUBJECTIVE:  The patient was resting on his bed, alert, quiet.  When approached,

the patient was calm and pleasant.  The patient was not as agitated today as he

was yesterday.  The patient continued to say that he is doing okay, eating and

sleeping well and denied any auditory or visual hallucination.  No delusion.  He

also denied any episodes of shadowboxing or punching the air.



OBJECTIVE:  The patient appeared to be calmer this morning.  The patient

continued to be verbally responsive; however, the patient was not very verbal

today.  The staff reported that the patient had been up and about in the unit. 

The patient has been to nursing station and asked for water.  When given water,

the patient would drink it.  Staff reported that the patient had not been

punching the air today.  Staff reported that the patient continued to be calm

and cooperative with care and treatment, taking his medication without any

problem.  The patient continued to keep to himself most of the time, staying in

his room on the bed and at times would come out and walk around before returning

to his room.



ASSESSMENT:

1.  Schizophrenia, chronic paranoid type, in acute exacerbation.

2.  Alzheimer dementia with psychosis and depression with behavioral

disturbance.

3.  Impulse control disorder, not otherwise specified.

4.  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



PLAN:  We will continue the patient on current Abilify.  We will monitor the

patient's behavior.  If the patient continued to exhibit recurrent aggressive

behavior, we will increase his Depakote back to 500 mg p.o. q.12 hours.





DD: 09/07/2019 11:25

DT: 09/08/2019 00:02

JOB# [id number]  [id number]

AYANNA

## 2019-09-08 NOTE — INTERNAL MEDICINE PROG NOTE
Internal Medicine Subjective





- Subjective


Service Date: 19


Patient seen and examined:: without staff (he is a wail,confused)


Patient is:: awake, non-verbal, in bed, confused


Per staff patient has:: no adverse event





Internal Medicine Objective





- Results


Result Diagrams: 


 19 20:05





 19 20:05


Recent Labs: 


 Laboratory Last Values











WBC  8.5 Th/cmm (4.8-10.8)   19  20:05    


 


RBC  4.58 Mil/cmm (3.80-5.80)   19  20:05    


 


Hgb  13.7 gm/dL (12-16)   19  20:05    


 


Hct  40.3 % (41.0-60)  L  19  20:05    


 


MCV  87.9 fl (80-99)   19  20:05    


 


MCH  30.0 pg (27.0-31.0)   19  20:05    


 


MCHC Differential  34.1 pg (28.0-36.0)   19  20:05    


 


RDW  12.7 % (11.5-20.0)   19  20:05    


 


Plt Count  276 Th/cmm (150-400)   19  20:05    


 


MPV  7.3 fl  19  20:05    


 


Neutrophils %  77.4 % (40.0-80.0)   19  20:05    


 


Lymphocytes %  15.6 % (20.0-50.0)  L  19  20:05    


 


Monocytes %  6.2 % (2.0-10.0)   19  20:05    


 


Eosinophils %  0.8 % (0.0-5.0)   19  20:05    


 


Basophils %  0.0 % (0.0-2.0)   19  20:05    


 


PT  10.6 SECONDS (9.5-11.5)   19  20:05    


 


INR  1.02  (0.5-1.4)   19  20:05    


 


PTT (Actin FS)  26.5 SECONDS (26.0-38.0)   19  20:05    


 


Sodium  135 mEq/L (136-145)  L  19  20:05    


 


Potassium  4.0 mEq/L (3.5-5.1)   19  20:05    


 


Chloride  102 mEq/L ()   19  20:05    


 


Carbon Dioxide  26.0 mEq/L (21.0-31.0)   19  20:05    


 


Anion Gap  11.0  (7.0-16.0)   19  20:05    


 


BUN  17 mg/dL (7-25)   19  20:05    


 


Creatinine  0.6 mg/dL (0.7-1.3)  L  19  20:05    


 


Est GFR ( Amer)  TNP   19  20:05    


 


Est GFR (Non-Af Amer)  TNP   19  20:05    


 


BUN/Creatinine Ratio  28.3   19  20:05    


 


Glucose  115 mg/dL ()  H  19  20:05    


 


Calcium  8.9 mg/dL (8.6-10.3)   19  20:05    


 


Total Bilirubin  0.5 mg/dL (0.3-1.0)   19  20:05    


 


AST  15 U/L (13-39)   19  20:05    


 


ALT  13 U/L (7-52)   19  20:05    


 


Alkaline Phosphatase  45 U/L ()   19  20:05    


 


Troponin I  < 0.02 ng/mL (0.01-0.05)   19  20:05    


 


B-Natriuretic Peptide  54.8 pg/mL (5.0-100.0)   19  20:05    


 


Total Protein  6.5 gm/dL (6.0-8.3)   19  20:05    


 


Albumin  3.7 gm/dL (4.2-5.5)  L  19  20:05    


 


Globulin  2.8 gm/dL  19  20:05    


 


Albumin/Globulin Ratio  1.3  (1.0-1.8)   19  20:05    


 


Triglycerides  101 mg/dL (<150)   19  20:05    


 


Cholesterol  145 mg/dL (<200)   19  20:05    


 


LDL Cholesterol Direct  76 mg/dL ()   19  20:05    


 


HDL Cholesterol  63 mg/dL (23-92)   19  20:05    


 


TSH  1.14 uIU/ml (0.34-5.60)   19  20:05    


 


Urine Source  MIDSTREAM   19  20:30    


 


Urine Color  YELLOW   19  20:30    


 


Urine Clarity  CLEAR  (CLEAR)   19  20:30    


 


Urine pH  5.5  (4.6 - 8.0)   19  20:30    


 


Ur Specific Gravity  1.025  (1.005-1.030)   19  20:30    


 


Urine Protein  NEGATIVE mg/dL (NEGATIVE)   19  20:30    


 


Urine Glucose (UA)  NEGATIVE mg/dL (NEGATIVE)   19  20:30    


 


Urine Ketones  NEGATIVE mg/dL (NEGATIVE)   19  20:30    


 


Urine Blood  NEGATIVE  (NEGATIVE)   19  20:30    


 


Urine Nitrate  NEGATIVE  (NEGATIVE)   19  20:30    


 


Urine Bilirubin  NEGATIVE  (NEGATIVE)   19  20:30    


 


Urine Urobilinogen  0.2 E.U./dL (0.2 - 1.0)   19  20:30    


 


Ur Leukocyte Esterase  NEGATIVE  (NEGATIVE)   19  20:30    


 


Urine RBC  NONE SEEN /hpf (0-5)   19  20:30    


 


Urine WBC  0-2 /hpf (0-5)   19  20:30    


 


Ur Epithelial Cells  FEW /lpf (FEW)   19  20:30    


 


Urine Bacteria  FEW /hpf (NONE SEEN)   19  20:30    


 


Urine Mucus  FEW /lpf (FEW)   19  20:30    














- Physical Exam


Vitals and I&O: 


 Vital Signs











Temp  97.8 F   19 14:00


 


Pulse  46   19 14:00


 


Resp  19   19 14:00


 


BP  98/52   19 14:00


 


Pulse Ox  96   19 14:00








 Intake & Output











 19





 18:59 06:59 18:59


 


Intake Total  120 1300


 


Balance  120 1300


 


Intake:   


 


  Oral  120 1300


 


Other:   


 


  # Voids  3 











Active Medications: 


Current Medications





Acetaminophen (Tylenol)  650 mg PO Q4HR PRN


   PRN Reason: Mild Pain / Temp above 100


   Stop: 10/27/19 21:26


Al Hydrox/Mg Hydrox/Simethicone (Maalox)  30 ml PO Q4HR PRN


   PRN Reason: GI DISTRESS


   Stop: 10/26/19 21:51


Aripiprazole (Abilify)  15 mg PO HS DANYA; Protocol


   Stop: 19 20:59


   Last Admin: 19 20:36 Dose:  15 mg


Benztropine Mesylate (Cogentin)  1 mg PO BID DANYA


   Stop: 10/27/19 08:59


   Last Admin: 19 17:47 Dose:  1 mg


Divalproex Sodium (Depakote Sprinkle)  375 mg PO Q12HR DANYA; Protocol


   Stop: 10/28/19 20:59


   Last Admin: 19 08:45 Dose:  375 mg


Docusate Sodium (Colace)  100 mg PO BID DANYA


   Stop: 10/27/19 08:59


   Last Admin: 19 17:47 Dose:  100 mg


Levothyroxine Sodium (Synthroid)  0.075 mg PO QDAC DANYA


   Stop: 10/27/19 07:29


   Last Admin: 19 06:35 Dose:  0.075 mg


Lorazepam (Ativan)  0.5 mg PO Q4HR PRN; Protocol


   PRN Reason: Agitation


   Stop: 19 21:19


   Last Admin: 19 08:31 Dose:  0.5 mg


Magnesium Hydroxide (Milk Of Magnesia)  30 ml PO HS PRN


   PRN Reason: Constipation


Metoprolol Succinate (Toprol Xl)  50 mg PO DAILY DANYA


   Stop: 10/27/19 08:59


   Last Admin: 19 08:44 Dose:  50 mg


Multivitamins/Vitamin C (Theragran)  1 tab PO DAILY DANYA


   Stop: 10/27/19 08:59


   Last Admin: 19 08:44 Dose:  1 tab


Zolpidem Tartrate (Ambien)  5 mg PO HS PRN


   PRN Reason: Insomnia


   Stop: 10/26/19 21:51


   Last Admin: 19 20:50 Dose:  5 mg








General: demented


HEENT: NC/AT, PERRLA, EOMI, anicteric sclerae, throat clear


Neck: Supple, No JVD, No thyromegaly, +2 carotid pulse wo bruit, No LAD


Lungs: CTAB


Cardiovascular: RRR, Normal S1, Normal S2, without murmur


Abdomen: soft, non-tender, non-distended


Neurological: no change, gait stable, unable to follow command





- Procedures


Procedures: 


 Procedures











Procedure Code Date


 


GROUP PSYCHOTHERAPY 84224 16


 


GROUP PSYCHOTHERAPY GZHZZZZ 16


 


GROUP PSYCHOTHERAPY 57014 16


 


GROUP PSYCHOTHERAPY GZHZZZZ 16


 


INDIVID PSYCHOTHERAP NEC 94.39 08


 


OTHER GROUP THERAPY 94.44 08/27/15


 


RECREATIONAL THERAPY 93.81 13














Internal Medicine Assmt/Plan





- Assessment


Assessment: 





1.HTN.


2.HYPOTHYROIDISM


3.DJD.


4.PSYCHOSIS





- Plan


Plan: 





CONTINUE ON CURRENT MEDICATION AND DIET.





Nutritional Asmnt/Malnutr-PDOC





- Dietary Evaluation


Malnutrition Findings (Please click <Entered> for more info): 








Nutritional Asmnt/Malnutrition                             Start:  19 11:

33


Text:                                                      Status: Complete    

  


Freq:                                                                          

  


Protocol:                                                                      

  


 Document     19 11:33  ELE  (Rec: 19 11:50  MMULRODNEY COLEMAN-

FNS1)


 Nutritional Asmnt/Malnutrition


     Patient General Information


      Nutritional Screening                      Low Risk


      Diagnosis                                  Psychosis


      Pertinent Medical Hx/Surgical Hx           Hypothyroidism, hypertension,


                                                 degenerative joint disease,


                                                 dementia, depression


      Subjective Information                     Patient was admitted from


                                                 nursing home with increased


                                                 agitation and aggressiveness.


                                                 Tolerating current diet with


                                                 adequate intake.


      Current Diet Order/ Nutrition Support      Regular


      Patient / S.O                              Not Indicated


      Pertinent Medications                      Maalox, Colace, Synthroid, MOM


                                                 , Theragran


      Pertinent Labs                             () Albumin 3.7


     Nutritional Hx/Data


      Height                                     1.8 m


      Height (Calculated Centimeters)            180.3


      Current Weight (lbs)                       75.75 kg


      Weight (Calculated Kilograms)              75.7


      Weight (Calculated Grams)                  87505.9


      Ideal Body Weight                          172


      % Ideal Body Weight                        97


      Body Mass Index (BMI)                      23.3


      Recent Weight Change                       No


      Weight Status                              Approriate


     GI Symptoms


      GI Symptoms                                None


      Last BM                                    8/31 x 1


      Difficult in:                              None


      Food Allergies                             No


      Cultural/Ethnic/Hinduism Belief           none indicated


      Usual diet at home                         unknown


      Skin Integrity/Comment:                    Thom 22, Intact


      Current %PO                                Good (%)


     Estimated Nutritional Goals


      BEE in Kcals:                              Using Current wt


      Calories/Kcals/Kg                          CBW 76.8kg 25-30 kcal/kg


      Kcals Calculated                           ~2424-9067 kcal/day


      Protein:                                   Using Current wt


      Protein g/k-1.2 gm/kg


      Protein Calculated                         ~75-90 gm/day


      Fluid: ml                                  ~3292-8910 ml/day (1 ml/kcal)


     Nutritional Problem


      No current Nutrition Prob


       Problem                                   No nutrition diagnosis at this


                                                 time


     Intervention/Recommendation


      Comments                                   1. Continue regular diet as


                                                 tolerated by patient.


     Expected Outcomes/Goals


      Expected Outcomes/Goals                    Oral intake >75% of meals,


                                                 weight stable, nutrition


                                                 related labs WNL


                                                 F/U LR

## 2019-09-09 RX ADMIN — LEVOTHYROXINE SODIUM SCH MG: 75 TABLET ORAL at 06:42

## 2019-09-09 NOTE — DISCHARGE SUMMARY
DATE OF DISCHARGE:     09/09/2019



REASON FOR ADMISSION:  The patient was admitted for increased episodes of

agitation, talking to herself with episodes of yelling and screaming and

punching at the air.



HISTORY OF PRESENT ILLNESS:  The patient is a 74-year-old  male who has

been hospitalized here many times in the past.  The patient is a resident of

[facility].



MENTAL STATUS EXAM:  The patient appeared appropriate for stated age.  The

patient was cooperative and maintained good eye contact.  Speech appeared to be

somewhat slow, but otherwise able to understand him most of the time.  Mood

appeared to be euthymic.  Sensorium, the patient was alert but disoriented to

time, place and person.  Memory appeared to be impaired for immediate,

short-term and long-term memory. Concentration appeared to be adequate. 

Short term memory appeared to be fair.  The patient was able to repeat the 4

items after they were given to him 3 times.  However, the patient was unable to

repeat any of the 4 items after a few minutes.  The patient was unable to give

logical interpretation to any of the proverbs.  Insight poor.  Judgment

impaired.  

LAB RESULTS: upon admission, hematocrit 40.3, lymphocytes 15.6, both

are slightly low.  The rest were normal.  Chemistry panel:  Sodium 135,

creatinine 0.6, albumin 3.7, this is slightly low; glucose 115, slightly

elevated.  Urinalysis was within normal limits.  

MEDS: After admission, the patient was started on Tylenol, simethicone, 

Abilify, Cogentin, Depakote, docusate sodium, levothyroxine, Ativan, magnesium 

hydroxide, metoprolol, multiple vitamin, and Ambien.



HOSPITAL COURSE:  The patient was pretty much pleasant during his stay.  The

patient continued to have episodes of talking to himself and episodes of getting

agitated and punching at the air.  According to the staff, the patient reported

that he is exercising.  Since the patient exhibited somewhat shuffling gait,

therefore, Depakote was reduced from 500 mg p.o. q. 12 hours to 375 mg q. 12

hours.  His Abilify was slowly increased to 10 mg at bedtime because of

recurrent episodes of agitation.  On one occasion, the patient was swinging his

arm and almost hit  as she was going by.  The patient also has

an episode where he became agitated for no apparent reason when tried to talk

to.  He would behave this writer to go away.  His Abilify was increased to 15 mg

at bedtime.  For the last couple of days, the patient had shown improvement in

his behavior.  The patient was calmer, more cheerful, and pleasant when talked

to.  Since the patient shows sufficient improvement with less episodes of

agitation and punching at the air and yelling and screaming, therefore, the

patient was discharged on 09/09/2019.



DISCHARGE MEDICATIONS:  The patient was discharged on Tylenol, simethicone,

Abilify 15 mg p.o. at bedtime, Cogentin 1 mg p.o. b.i.d., Depakote 375 mg q.12

hours, docusate sodium, levothyroxine, Ativan 0.5 mg q. 4 hours p.r.n.,

magnesium hydroxide, metoprolol, multiple vitamin, and Ambien 5 mg p.o. at

bedtime.



FINAL DIAGNOSES:

1.  Schizophrenia, chronic paranoid type, in acute exacerbation, improved.

2.  Alzheimer's dementia with psychosis and depression with behavioral

disturbance.

3.  Impulse control disorder, not otherwise specified.

4.  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



DISCHARGE CONDITION:  The patient was discharged in improved condition.





DD: 09/09/2019 10:50

DT: 09/09/2019 11:31

[facility]# [id number]  [id number]

AYANNA

## 2019-09-09 NOTE — PROGRESS NOTES
DATE:     



SUBJECTIVE:  The patient was resting in bed with his knees bent up to at his

chest.  When approached, the patient was cheerful and pleasant this morning. 

The patient started smiling and reach-out for a handshake.  The patient reported

that he is doing okay, eating and sleeping fine.  The patient continued to deny

any auditory or visual hallucination or delusion.



OBJECTIVE:  The patient appeared to be more cheerful and pleasant this morning. 

The staff reported that the patient has been doing okay.  The patient has been

up and about, on and off.  At times, the patient would ask for water, which he

would drink when given.  The staff reported that the patient continued to be

compliant with care and treatment, taking his medication without any problem. 

The patient continued to be eating and sleeping well.



ASSESSMENT:

1.  Schizophrenia, chronic paranoid type, in acute exacerbation.

2.  Alzheimer dementia with psychosis and depression with behavioral

disturbance.

3.  Impulse control disorder, not otherwise specified.

4.  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



PLAN:  We will continue the patient on current medication since the patient

appeared to be doing okay today.  We will continue to monitor the patient's

behavior at this time.





DD: 09/08/2019 09:50

DT: 09/08/2019 21:34

JOB# [id number]  [id number]

AYANNA

## 2021-11-30 ENCOUNTER — HOSPITAL ENCOUNTER (EMERGENCY)
Dept: HOSPITAL 4 - SED | Age: 76
LOS: 1 days | Discharge: PSYCHIATRIC OR SUBSTANCE USE FACILITY | End: 2021-12-01
Payer: COMMERCIAL

## 2021-11-30 VITALS — BODY MASS INDEX: 21.67 KG/M2 | WEIGHT: 160 LBS | HEIGHT: 72 IN

## 2021-11-30 VITALS — SYSTOLIC BLOOD PRESSURE: 107 MMHG

## 2021-11-30 DIAGNOSIS — Z20.822: ICD-10-CM

## 2021-11-30 DIAGNOSIS — Z79.899: ICD-10-CM

## 2021-11-30 DIAGNOSIS — R45.6: Primary | ICD-10-CM

## 2021-11-30 LAB
ALBUMIN SERPL BCP-MCNC: 3.5 G/DL (ref 3.4–4.8)
ALT SERPL W P-5'-P-CCNC: 19 U/L (ref 12–78)
AMPHETAMINES UR QL SCN: NEGATIVE
ANION GAP SERPL CALCULATED.3IONS-SCNC: 3 MMOL/L (ref 5–15)
APAP SERPL-MCNC: < 1 UG/ML (ref 1–30)
APPEARANCE UR: CLEAR
AST SERPL W P-5'-P-CCNC: 20 U/L (ref 10–37)
BARBITURATES UR QL SCN: NEGATIVE
BASOPHILS # BLD AUTO: 0 K/UL (ref 0–0.2)
BASOPHILS NFR BLD AUTO: 0.4 % (ref 0–2)
BENZODIAZ UR QL SCN: POSITIVE
BILIRUB SERPL-MCNC: 0.3 MG/DL (ref 0–1)
BILIRUB UR QL STRIP: NEGATIVE
BUN SERPL-MCNC: 34 MG/DL (ref 8–21)
BZE UR QL SCN: NEGATIVE
CALCIUM SERPL-MCNC: 8.9 MG/DL (ref 8.4–11)
CANNABINOIDS UR QL SCN: NEGATIVE
CHLORIDE SERPL-SCNC: 106 MMOL/L (ref 98–107)
CHOLEST SERPL-MCNC: 174 MG/DL (ref ?–200)
COLOR UR: YELLOW
CREAT SERPL-MCNC: 1 MG/DL (ref 0.55–1.3)
EOSINOPHIL # BLD AUTO: 0 K/UL (ref 0–0.4)
EOSINOPHIL NFR BLD AUTO: 0.2 % (ref 0–4)
ERYTHROCYTE [DISTWIDTH] IN BLOOD BY AUTOMATED COUNT: 14.4 % (ref 9–15)
ETHANOL SERPL-MCNC: < 3 MG/DL (ref ?–10)
GFR SERPL CREATININE-BSD FRML MDRD: (no result) ML/MIN (ref 90–?)
GLUCOSE SERPL-MCNC: 93 MG/DL (ref 70–99)
GLUCOSE UR STRIP-MCNC: NEGATIVE MG/DL
HCT VFR BLD AUTO: 37.7 % (ref 36–54)
HDLC SERPL-MCNC: 87 MG/DL (ref 45–?)
HGB BLD-MCNC: 12.6 G/DL (ref 14–18)
HGB UR QL STRIP: NEGATIVE
KETONES UR STRIP-MCNC: NEGATIVE MG/DL
LDL CHOLESTEROL: 72 MG/DL (ref ?–100)
LEUKOCYTE ESTERASE UR QL STRIP: NEGATIVE
LYMPHOCYTES # BLD AUTO: 1 K/UL (ref 1–5.5)
LYMPHOCYTES NFR BLD AUTO: 10.6 % (ref 20.5–51.5)
MCH RBC QN AUTO: 29 PG (ref 27–31)
MCHC RBC AUTO-ENTMCNC: 33 % (ref 32–36)
MCV RBC AUTO: 87 FL (ref 79–98)
METHADONE UR-SCNC: NEGATIVE UMOL/L
METHAMPHET UR-SCNC: NEGATIVE UMOL/L
MONOCYTES # BLD MANUAL: 0.5 K/UL (ref 0–1)
MONOCYTES # BLD MANUAL: 5.7 % (ref 1.7–9.3)
NEUTROPHILS # BLD AUTO: 7.5 K/UL (ref 1.8–7.7)
NEUTROPHILS NFR BLD AUTO: 83.1 % (ref 40–70)
NITRITE UR QL STRIP: NEGATIVE
OPIATES UR QL SCN: NEGATIVE
OXYCODONE SERPL-MCNC: NEGATIVE NG/ML
PCP UR QL SCN: NEGATIVE
PH UR STRIP: 6 [PH] (ref 5–8)
PLATELET # BLD AUTO: 262 K/UL (ref 130–430)
POTASSIUM SERPL-SCNC: 4.3 MMOL/L (ref 3.5–5.1)
PROT UR QL STRIP: NEGATIVE
RBC # BLD AUTO: 4.33 MIL/UL (ref 4.2–6.2)
SODIUM SERPLBLD-SCNC: 139 MMOL/L (ref 136–145)
SP GR UR STRIP: 1.02 (ref 1–1.03)
TRICYCLICS UR-MCNC: NEGATIVE NG/ML
TRIGL SERPL-MCNC: 44 MG/DL (ref 30–150)
URINE PROPOXYPHENE SCREEN: NEGATIVE
UROBILINOGEN UR STRIP-MCNC: 0.2 MG/DL (ref 0.2–1)
WBC # BLD AUTO: 9 K/UL (ref 4.8–10.8)

## 2021-11-30 PROCEDURE — 83036 HEMOGLOBIN GLYCOSYLATED A1C: CPT

## 2021-11-30 PROCEDURE — G0480 DRUG TEST DEF 1-7 CLASSES: HCPCS

## 2021-11-30 PROCEDURE — 85025 COMPLETE CBC W/AUTO DIFF WBC: CPT

## 2021-11-30 PROCEDURE — 80307 DRUG TEST PRSMV CHEM ANLYZR: CPT

## 2021-11-30 PROCEDURE — 36415 COLL VENOUS BLD VENIPUNCTURE: CPT

## 2021-11-30 PROCEDURE — 80061 LIPID PANEL: CPT

## 2021-11-30 PROCEDURE — 80053 COMPREHEN METABOLIC PANEL: CPT

## 2021-11-30 PROCEDURE — G0482 DRUG TEST DEF 15-21 CLASSES: HCPCS

## 2021-11-30 PROCEDURE — 81003 URINALYSIS AUTO W/O SCOPE: CPT

## 2021-11-30 PROCEDURE — G0481 DRUG TEST DEF 8-14 CLASSES: HCPCS

## 2021-11-30 PROCEDURE — 87426 SARSCOV CORONAVIRUS AG IA: CPT

## 2021-11-30 PROCEDURE — 87081 CULTURE SCREEN ONLY: CPT

## 2021-11-30 PROCEDURE — 99285 EMERGENCY DEPT VISIT HI MDM: CPT

## 2021-11-30 NOTE — NUR
Assumed total care of patient. Patient awake, alert, walking around room with 
steady gait. Patient awaiting for transportation to [facility]. Patient has 
been medically cleared by MD. Patient denies any medical complaints. Patient 
breathing even and unlabored, symmetrical chest and rise and fall, no signs of 
acute distress noted. Will continue to monitor.

## 2021-11-30 NOTE — NUR
Patient sitting calmly in bed drinking water. Patient alert awake, calm and 
cooperative. No signs of apparent distress

## 2021-11-30 NOTE — NUR
Patient to be transferred to [facility].  Is being transferred due to higher 
level of care.  Receiving facility has accepting physician and available space. 
ER physician has signed transfer form.  Patient or responsible party has agreed 
to transfer and signed form.  Patient belongings inventoried and will be sent 
with patient.  Copy of nursing notes, lab reports, EKG, Physicians Orders and 
X-rays to be sent with patient.  Report called to KARLIE Ortiz at receiving 
facility. Receiving physician is Dr. Flores. [facility] ambulance service 
has been called for transfer.  ETA is [id number].

## 2021-11-30 NOTE — NUR
Patient walking with the hallway, patient instructed to go back to bed. Patient 
able to follows commands and is cooperative. No signs of apparent distress 
noted. Patient ambulates with steady gait

## 2021-11-30 NOTE — NUR
Pt here for medical clearance to go to [facility], Room 58A. No complaints 
at this time, v/s stable, no acute distress noted.

## 2021-12-01 VITALS — SYSTOLIC BLOOD PRESSURE: 113 MMHG

## 2021-12-01 NOTE — NUR
Patient given written and verbal discharge instructions and verbalizes 
understanding.  ER MD discussed with patient the results and treatment 
provided. Patient in stable condition. ID arm band removed. no IV 

no Rx given. Patient educated on pain management and to follow up with PMD. 
Pain Scale 0/10.

Opportunity for questions provided and answered. Medication side effect fact 
sheet provided.